# Patient Record
Sex: FEMALE | Race: WHITE | Employment: FULL TIME | URBAN - METROPOLITAN AREA
[De-identification: names, ages, dates, MRNs, and addresses within clinical notes are randomized per-mention and may not be internally consistent; named-entity substitution may affect disease eponyms.]

---
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
## 2022-01-11 ENCOUNTER — HOSPITAL ENCOUNTER (INPATIENT)
Age: 22
LOS: 3 days | Discharge: HOME | DRG: 885 | End: 2022-01-14
Attending: PSYCHIATRY & NEUROLOGY | Admitting: PSYCHIATRY & NEUROLOGY
Payer: COMMERCIAL

## 2022-01-11 DIAGNOSIS — F22 DELUSIONS (HCC): Primary | ICD-10-CM

## 2022-01-11 PROBLEM — F29 PSYCHOTIC DISORDER WITH DELUSIONS (HCC): Status: ACTIVE | Noted: 2022-01-11

## 2022-01-11 LAB
A/G RATIO: 1.7 (ref 1.1–2.2)
ALBUMIN SERPL-MCNC: 4.9 G/DL (ref 3.4–5)
ALP BLD-CCNC: 67 U/L (ref 40–129)
ALT SERPL-CCNC: 11 U/L (ref 10–40)
AMPHETAMINE SCREEN, URINE: NORMAL
ANION GAP SERPL CALCULATED.3IONS-SCNC: 10 MMOL/L (ref 3–16)
AST SERPL-CCNC: 13 U/L (ref 15–37)
BARBITURATE SCREEN URINE: NORMAL
BASOPHILS ABSOLUTE: 0.1 K/UL (ref 0–0.2)
BASOPHILS RELATIVE PERCENT: 1.1 %
BENZODIAZEPINE SCREEN, URINE: NORMAL
BILIRUB SERPL-MCNC: 0.4 MG/DL (ref 0–1)
BILIRUBIN URINE: NEGATIVE
BLOOD, URINE: NEGATIVE
BUN BLDV-MCNC: 8 MG/DL (ref 7–20)
CALCIUM SERPL-MCNC: 9.2 MG/DL (ref 8.3–10.6)
CANNABINOID SCREEN URINE: NORMAL
CHLORIDE BLD-SCNC: 104 MMOL/L (ref 99–110)
CLARITY: CLEAR
CO2: 26 MMOL/L (ref 21–32)
COCAINE METABOLITE SCREEN URINE: NORMAL
COLOR: YELLOW
CREAT SERPL-MCNC: <0.5 MG/DL (ref 0.6–1.1)
EOSINOPHILS ABSOLUTE: 0 K/UL (ref 0–0.6)
EOSINOPHILS RELATIVE PERCENT: 0.7 %
ETHANOL: NORMAL MG/DL (ref 0–0.08)
GFR AFRICAN AMERICAN: >60
GFR NON-AFRICAN AMERICAN: >60
GLUCOSE BLD-MCNC: 101 MG/DL (ref 70–99)
GLUCOSE URINE: NEGATIVE MG/DL
HCG QUALITATIVE: NEGATIVE
HCT VFR BLD CALC: 38.8 % (ref 36–48)
HEMOGLOBIN: 13.2 G/DL (ref 12–16)
KETONES, URINE: NEGATIVE MG/DL
LEUKOCYTE ESTERASE, URINE: NEGATIVE
LYMPHOCYTES ABSOLUTE: 1 K/UL (ref 1–5.1)
LYMPHOCYTES RELATIVE PERCENT: 21.9 %
Lab: NORMAL
MCH RBC QN AUTO: 28.9 PG (ref 26–34)
MCHC RBC AUTO-ENTMCNC: 33.9 G/DL (ref 31–36)
MCV RBC AUTO: 85.3 FL (ref 80–100)
METHADONE SCREEN, URINE: NORMAL
MICROSCOPIC EXAMINATION: NORMAL
MONOCYTES ABSOLUTE: 0.3 K/UL (ref 0–1.3)
MONOCYTES RELATIVE PERCENT: 6.6 %
NEUTROPHILS ABSOLUTE: 3.2 K/UL (ref 1.7–7.7)
NEUTROPHILS RELATIVE PERCENT: 69.7 %
NITRITE, URINE: NEGATIVE
OPIATE SCREEN URINE: NORMAL
OXYCODONE URINE: NORMAL
PDW BLD-RTO: 13.1 % (ref 12.4–15.4)
PH UA: 7.5
PH UA: 7.5 (ref 5–8)
PHENCYCLIDINE SCREEN URINE: NORMAL
PLATELET # BLD: 286 K/UL (ref 135–450)
PMV BLD AUTO: 6.6 FL (ref 5–10.5)
POTASSIUM REFLEX MAGNESIUM: 4.1 MMOL/L (ref 3.5–5.1)
PROPOXYPHENE SCREEN: NORMAL
PROTEIN UA: NEGATIVE MG/DL
RBC # BLD: 4.55 M/UL (ref 4–5.2)
SARS-COV-2, NAAT: NOT DETECTED
SODIUM BLD-SCNC: 140 MMOL/L (ref 136–145)
SPECIFIC GRAVITY UA: 1.01 (ref 1–1.03)
TOTAL PROTEIN: 7.8 G/DL (ref 6.4–8.2)
URINE TYPE: NORMAL
UROBILINOGEN, URINE: 0.2 E.U./DL
WBC # BLD: 4.6 K/UL (ref 4–11)

## 2022-01-11 PROCEDURE — 85025 COMPLETE CBC W/AUTO DIFF WBC: CPT

## 2022-01-11 PROCEDURE — 1240000000 HC EMOTIONAL WELLNESS R&B

## 2022-01-11 PROCEDURE — 81003 URINALYSIS AUTO W/O SCOPE: CPT

## 2022-01-11 PROCEDURE — 80307 DRUG TEST PRSMV CHEM ANLYZR: CPT

## 2022-01-11 PROCEDURE — 87635 SARS-COV-2 COVID-19 AMP PRB: CPT

## 2022-01-11 PROCEDURE — 80053 COMPREHEN METABOLIC PANEL: CPT

## 2022-01-11 PROCEDURE — 36415 COLL VENOUS BLD VENIPUNCTURE: CPT

## 2022-01-11 PROCEDURE — 82077 ASSAY SPEC XCP UR&BREATH IA: CPT

## 2022-01-11 PROCEDURE — 84703 CHORIONIC GONADOTROPIN ASSAY: CPT

## 2022-01-11 PROCEDURE — 99284 EMERGENCY DEPT VISIT MOD MDM: CPT

## 2022-01-11 RX ORDER — HYDROXYZINE PAMOATE 25 MG/1
50 CAPSULE ORAL NIGHTLY PRN
Status: ON HOLD | COMMUNITY
Start: 2021-12-30 | End: 2022-01-14 | Stop reason: HOSPADM

## 2022-01-11 RX ORDER — DIPHENHYDRAMINE HYDROCHLORIDE 50 MG/ML
50 INJECTION INTRAMUSCULAR; INTRAVENOUS EVERY 4 HOURS PRN
Status: DISCONTINUED | OUTPATIENT
Start: 2022-01-11 | End: 2022-01-14 | Stop reason: HOSPADM

## 2022-01-11 RX ORDER — POLYETHYLENE GLYCOL 3350 17 G
2 POWDER IN PACKET (EA) ORAL
Status: DISCONTINUED | OUTPATIENT
Start: 2022-01-11 | End: 2022-01-14 | Stop reason: HOSPADM

## 2022-01-11 RX ORDER — MAGNESIUM HYDROXIDE/ALUMINUM HYDROXICE/SIMETHICONE 120; 1200; 1200 MG/30ML; MG/30ML; MG/30ML
30 SUSPENSION ORAL EVERY 6 HOURS PRN
Status: DISCONTINUED | OUTPATIENT
Start: 2022-01-11 | End: 2022-01-14 | Stop reason: HOSPADM

## 2022-01-11 RX ORDER — LANOLIN ALCOHOL/MO/W.PET/CERES
3 CREAM (GRAM) TOPICAL NIGHTLY PRN
Status: DISCONTINUED | OUTPATIENT
Start: 2022-01-11 | End: 2022-01-12

## 2022-01-11 RX ORDER — PALIPERIDONE 1.5 MG/1
TABLET, EXTENDED RELEASE ORAL
Status: ON HOLD | COMMUNITY
Start: 2022-01-03 | End: 2022-01-14 | Stop reason: HOSPADM

## 2022-01-11 RX ORDER — OLANZAPINE 10 MG/1
10 INJECTION, POWDER, LYOPHILIZED, FOR SOLUTION INTRAMUSCULAR EVERY 8 HOURS PRN
Status: DISCONTINUED | OUTPATIENT
Start: 2022-01-11 | End: 2022-01-14 | Stop reason: HOSPADM

## 2022-01-11 RX ORDER — IBUPROFEN 400 MG/1
400 TABLET ORAL EVERY 6 HOURS PRN
Status: DISCONTINUED | OUTPATIENT
Start: 2022-01-11 | End: 2022-01-14 | Stop reason: HOSPADM

## 2022-01-11 RX ORDER — ACETAMINOPHEN 325 MG/1
650 TABLET ORAL EVERY 4 HOURS PRN
Status: DISCONTINUED | OUTPATIENT
Start: 2022-01-11 | End: 2022-01-14 | Stop reason: HOSPADM

## 2022-01-11 RX ORDER — PRAZOSIN HYDROCHLORIDE 1 MG/1
CAPSULE ORAL
Status: ON HOLD | COMMUNITY
Start: 2021-12-30 | End: 2022-01-12 | Stop reason: ALTCHOICE

## 2022-01-11 RX ORDER — HYDROXYZINE HYDROCHLORIDE 10 MG/1
50 TABLET, FILM COATED ORAL 3 TIMES DAILY PRN
Status: DISCONTINUED | OUTPATIENT
Start: 2022-01-11 | End: 2022-01-14 | Stop reason: HOSPADM

## 2022-01-11 RX ORDER — OLANZAPINE 10 MG/1
10 TABLET ORAL EVERY 8 HOURS PRN
Status: DISCONTINUED | OUTPATIENT
Start: 2022-01-11 | End: 2022-01-14 | Stop reason: HOSPADM

## 2022-01-11 ASSESSMENT — PATIENT HEALTH QUESTIONNAIRE - PHQ9: SUM OF ALL RESPONSES TO PHQ QUESTIONS 1-9: 15

## 2022-01-11 NOTE — ED NOTES
Pt denies suicidal ideations to this RN. Pt reports that their therapist doesn't understand them and thinks their crazy. When asked if pt is SI/HI pt denies.       Ashly Cardozo, VIC  01/11/22 7414

## 2022-01-11 NOTE — ED NOTES
Pt taken to [facility] AN AFFILIATE OF [facility] ER for psychiatric care     Maude Stevenson RN  01/11/22 1890
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
## 2022-01-11 NOTE — ED NOTES
Presenting Problem:Patient presents to ED on SOB by mobile crisis. Per mobile crisis patient's therapist Araceli Turcios [phone] requested she be brought in. Per SOB patient made statements about killing herself to him. Per SOB upon arrival to patient home Mobile Crisis found patient to present with delusional thoughts patterns making comments about \" not being here\", and \"this isn't reality\" and \"needing to get out of this reality\". SOB reports she made comment that she wanted to weigh her options related to dying by suicide. Patient reported to them that anything could be dangerous and spoke of several ways to kill herself such as with a sword, overdosing, or hanging herself. Upon meeting with patient the patient appeared anxious and found to be rocking back and forth. Patient had been rocking back and forth while in ER and in ABI. Patient cooperative and alert and oriented. Patient reports feeling she is not in reality and feels she should not be here. Patient made several comments about me not being able to understand and comments that she knows I think she is crazy however she is not. Made comments that I (this nurse) may be part of it all but she does not know. Patient made several comments about needing to get out of here and needing to get back. When asked how she is going to get back she commented that she did not know but she has to figure it out. Patient made comment during conversation about dying and possibly going back but probably will have to come back here. Patient denies thoughts of harming self or others and denies having suicidal thoughts. Patient reports she did have past suicide attempts however has been doing better. Patient has old scarring on arms and legs from self harm and cutting. Reports she has not cut in 2 weeks.      Appearance/Hygiene:  hospital attire, fair grooming and fair hygiene   Motor Behavior: wnl   Attitude: cooperative  Affect: anxiety   Speech: normal pitch and normal volume  Mood: anxious   Thought Processes: Unusual fears - paranoid delusions  Perceptions: Absent - denies - Does not appear to be responding to internal stimuli  Thought content: paranoid delusions   Orientation: A&Ox4   Memory: intact  Concentration: poor    Insight/ judgement: paranoid ideations      Psychosocial and contextual factors: Patient lives with her best friend Demetris Cain. 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 [employer] flowsheet is  Complete. Psychiatric History (including current outpatient provider and past inpatient admissions): See's a therapist at [address] [place] [address] [phone] . See's a Psychiatric provider [place] [facility]. Patient has been admitted here on [facility] in 6/2021, has been admitted to [facility] in past and multiple admissions at [address] [place] [address].      Access to Firearms: denies      ASSESSMENT FOR IMMINENT FUTURE DANGER:    RISK FACTORS:    [x]  Age <25 or >49   []  Male gender   [x]  Depressed mood   []  Active suicidal ideation   []  Suicide plan   []  Suicide attempt   []  Access to lethal means   []  Prior suicide attempt   []  Active substance abuse ()   []  Highly impulsive behaviors   []  Not attending to self-care/ADLs    []  Recent significant loss   []  Chronic pain or medical illness   []  Social isolation   []  History of violence    [x]  Active psychosis   []  Cognitive impairment    []  No outpatient services in place   []  Medication noncompliance   []  No collateral information to support safety  [] Self- injurious/ Self-harm behavior    PROTECTIVE FACTORS:  [] Age >25 and <55  [x] Female gender   [] Denies depression  [] Denies suicidal ideation  [] Does not have lethal plan   [] Does not have access to guns or weapons  [] Patient is verbally joy for safety  [] No prior suicide attempts  [x] No active substance abuse  [x] Patient has social or family support  [] No active psychosis or cognitive dysfunction  [x] Physically healthy  [x] Has outpatient services in place  [x] Compliant with recommended medications  [] Collateral information from  supports patient safety   [] Patient is future oriented with plans to            [facility], RN  01/11/22 9760

## 2022-01-11 NOTE — ED NOTES
[facility] Val[facility] ED Addiction Navigator met with patient to complete:  [x] Screening, Brief Intervention and Referral to Treatment (SBIRT) Screen  [] Brief Intervention  [] Referral to Treatment  [x] Substance Use Education  [x] Other: (specify)    Community Resource Education - Denies a need at this time     Patient Encounter Details:    Gaby Grief states they haven't drank for a few weeks but verbalized heavy drinking prior to quitting a few weeks ago. The patient was given my contact info and knows to follow up with me if needed    Patient completed the alcohol portion of the screening with a TOTAL SCORE[de-identified] 8, indicating Risky use. Patient completed the drug portion of the screening with a total score of 0, indicating Low Risk use. Patient completed the mood portion of the screening with a TOTAL SCORE [de-identified] 15, indicating Moderately Severe depression. ED Addiction Navigator educated patient on health risks associated with substance use disorder/opioid epidemic and provided information on local resources. ED Addiction Navigator provided their contact information to further assist the patient as needed.     Keerthi Torres  01/11/22 3087

## 2022-01-11 NOTE — ED NOTES
Pt straight stuck for labs, urine collected, covid swab obtained and sent to lab.       Cally Casey RN  01/11/22 6428
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
## 2022-01-12 PROBLEM — F31.2 BIPOLAR DISORDER, CURRENT EPISODE MANIC SEVERE WITH PSYCHOTIC FEATURES (HCC): Status: ACTIVE | Noted: 2022-01-12

## 2022-01-12 PROBLEM — T14.91XA SUICIDE ATTEMPT (HCC): Status: RESOLVED | Noted: 2021-05-31 | Resolved: 2022-01-12

## 2022-01-12 PROBLEM — F64.0 GENDER DYSPHORIA IN ADULT: Status: ACTIVE | Noted: 2022-01-12

## 2022-01-12 PROBLEM — Z91.89 POTENTIAL FOR INTENTIONAL SELF-HARM: Status: ACTIVE | Noted: 2022-01-12

## 2022-01-12 PROBLEM — R45.89 POTENTIAL FOR INTENTIONAL SELF-HARM: Status: ACTIVE | Noted: 2022-01-12

## 2022-01-12 PROCEDURE — 99223 1ST HOSP IP/OBS HIGH 75: CPT | Performed by: PSYCHIATRY & NEUROLOGY

## 2022-01-12 PROCEDURE — 6370000000 HC RX 637 (ALT 250 FOR IP): Performed by: PSYCHIATRY & NEUROLOGY

## 2022-01-12 PROCEDURE — 99221 1ST HOSP IP/OBS SF/LOW 40: CPT

## 2022-01-12 PROCEDURE — 1240000000 HC EMOTIONAL WELLNESS R&B

## 2022-01-12 RX ORDER — PALIPERIDONE 3 MG/1
3 TABLET, EXTENDED RELEASE ORAL DAILY
Status: DISCONTINUED | OUTPATIENT
Start: 2022-01-12 | End: 2022-01-13

## 2022-01-12 RX ORDER — MECOBALAMIN 5000 MCG
10 TABLET,DISINTEGRATING ORAL NIGHTLY
Status: DISCONTINUED | OUTPATIENT
Start: 2022-01-12 | End: 2022-01-14 | Stop reason: HOSPADM

## 2022-01-12 RX ORDER — ARIPIPRAZOLE 10 MG/1
5 TABLET ORAL ONCE
Status: DISCONTINUED | OUTPATIENT
Start: 2022-01-12 | End: 2022-01-12 | Stop reason: CLARIF

## 2022-01-12 RX ORDER — ARIPIPRAZOLE 10 MG/1
5 TABLET ORAL ONCE
Status: DISCONTINUED | OUTPATIENT
Start: 2022-01-12 | End: 2022-01-13

## 2022-01-12 RX ADMIN — HYDROXYZINE HYDROCHLORIDE 50 MG: 10 TABLET ORAL at 00:38

## 2022-01-12 RX ADMIN — PALIPERIDONE 3 MG: 3 TABLET, EXTENDED RELEASE ORAL at 10:36

## 2022-01-12 ASSESSMENT — LIFESTYLE VARIABLES
HISTORY_ALCOHOL_USE: YES
HISTORY_ALCOHOL_USE: YES

## 2022-01-12 ASSESSMENT — SLEEP AND FATIGUE QUESTIONNAIRES
SLEEP PATTERN: DIFFICULTY FALLING ASLEEP;DISTURBED/INTERRUPTED SLEEP;NIGHTMARES/TERRORS
RESTFUL SLEEP: NO
DO YOU HAVE DIFFICULTY SLEEPING: YES
DIFFICULTY STAYING ASLEEP: YES
DIFFICULTY FALLING ASLEEP: YES
AVERAGE NUMBER OF SLEEP HOURS: 2
DIFFICULTY FALLING ASLEEP: YES
SLEEP PATTERN: DIFFICULTY FALLING ASLEEP;DISTURBED/INTERRUPTED SLEEP;NIGHTMARES/TERRORS
DO YOU USE A SLEEP AID: YES
RESTFUL SLEEP: NO
DO YOU USE A SLEEP AID: YES
DIFFICULTY ARISING: NO
DO YOU HAVE DIFFICULTY SLEEPING: YES
AVERAGE NUMBER OF SLEEP HOURS: 2
DIFFICULTY STAYING ASLEEP: YES
DIFFICULTY ARISING: NO

## 2022-01-12 ASSESSMENT — PAIN DESCRIPTION - LOCATION: LOCATION: HAND

## 2022-01-12 ASSESSMENT — ENCOUNTER SYMPTOMS
EYES NEGATIVE: 1
RESPIRATORY NEGATIVE: 1
GASTROINTESTINAL NEGATIVE: 1

## 2022-01-12 ASSESSMENT — PAIN DESCRIPTION - FREQUENCY: FREQUENCY: CONTINUOUS

## 2022-01-12 ASSESSMENT — PAIN DESCRIPTION - ORIENTATION: ORIENTATION: LEFT

## 2022-01-12 ASSESSMENT — PAIN DESCRIPTION - PROGRESSION: CLINICAL_PROGRESSION: GRADUALLY IMPROVING

## 2022-01-12 ASSESSMENT — PAIN DESCRIPTION - ONSET: ONSET: UNABLE TO TELL

## 2022-01-12 ASSESSMENT — PAIN SCALES - GENERAL: PAINLEVEL_OUTOF10: 4

## 2022-01-12 ASSESSMENT — PAIN - FUNCTIONAL ASSESSMENT: PAIN_FUNCTIONAL_ASSESSMENT: ACTIVITIES ARE NOT PREVENTED

## 2022-01-12 ASSESSMENT — PAIN DESCRIPTION - DESCRIPTORS: DESCRIPTORS: ACHING

## 2022-01-12 ASSESSMENT — PATIENT HEALTH QUESTIONNAIRE - PHQ9: SUM OF ALL RESPONSES TO PHQ QUESTIONS 1-9: 13

## 2022-01-12 NOTE — PROGRESS NOTES
Patient reports meeting with provider this morning went well and that the expectation is to stay 4-5 days.

## 2022-01-12 NOTE — BH NOTE
0700  Continues on Line of Sight 1:1 for safety. Cooperative. Sleeps for very brief periods. There were loud noises from an upper floor and pt was awakened. Rocks at times.

## 2022-01-12 NOTE — BH NOTE
0500  Pt lying on bed, possibly sleeping. Respirations easy and even. No movement noted at this time.

## 2022-01-12 NOTE — PLAN OF CARE
Problem: Depressive Behavior With or Without Suicide Precautions:  Goal: Able to verbalize and/or display a decrease in depressive symptoms  Description: Able to verbalize and/or display a decrease in depressive symptoms  Outcome: Ongoing     Problem: Altered Mood, Psychotic Behavior:  Goal: Able to verbalize decrease in frequency and intensity of hallucinations  Description: Able to verbalize decrease in frequency and intensity of hallucinations  Outcome: Ongoing   Patient is sitting on the stretcher rocking and running fingers through hair. Staff asked if they were feeling anxious, but denies they are. Patient is A&Ox3 and has no insight into why they are in the hospital. They deny that they are crazy and have no reason for staying. They deny they are having thoughts that to want to harm self or others. They deny that they are experiencing hallucinations like voices, but does admit to seeing shadows and things out of the corner of their eye at home. Patient does seem preoccupied with IS. They have not made any delusional statements. They recently started seeing a psychiatrist Shaylee Srinivasan and started on Invega about 1 week ago with the dose 1.5 mg and moved it to 3 mg 2 days ago. He has not felt any different effect. He has been on multiple medications in the past and told staff: Zoloft, Prozac, Lithium, Risperdal, and Seroquel. He reports being young when being started on medications. He does not recall which medications were effective. His psychiatrist did not explain why they choose Invega. He started with a psychiatrist because his therapist of a year was going to stop treating if he did not go to a psychiatrist. He does not engage staff with conversation much and mainly answer questions verses elaborating on answers. He also will just not acknowledge that staff is talking to him.  As he was sitting on the bed and with the patient gown staff could see his left leg and there was a large amount of faint scarring what appears from cutting from the knee to the hip area. His skin on the left thigh was intact.
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
## 2022-01-12 NOTE — PROGRESS NOTES
Patient declined to eat breakfast and did not want to order lunch or dinner. He was made aware if he changes his mind staff would call down to get him something.
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
## 2022-01-12 NOTE — BH NOTE
Pt had been awake and rocking on her bead. Awake. Pt will lie down for a few moments at a time but then will sit back up and rock.

## 2022-01-12 NOTE — BH NOTE
Pt is quiet in her room. Sitting on bed and rocking. No needs voice. Pt is not taking any food or water from staff. Offered sealed juice, but denied need.

## 2022-01-12 NOTE — BH NOTE
O600  Pt remains in her room on her bed and is a line of sight 1:1 for safety. Pt at times is rocking. Occasionally appears to sleep for a very few minutes at a time. Quiet. Not eating or drinking since arrived.

## 2022-01-12 NOTE — CARE COORDINATION
[facility]  Treatment Team Note  Day 1    Review Date & Time: 0900 1/12/22    Patient was not in treatment team      Status EXAM:   Status and Exam  Normal: No  Facial Expression: Avoids Gaze,Flat,Worried  Affect: Blunt  Level of Consciousness: Alert  Mood:Normal: No  Mood: Depressed,Anxious,Suspicious  Motor Activity:Normal: No  Motor Activity: Decreased,Repetitive Acts  Interview Behavior: Evasive,Cooperative,Uncooperative/Withdrawn  Preception: [place] to Person,[place] to Time,[place] to Place  Attention:Normal: No  Attention: Distractible  Thought Processes: Tangential  Thought Content:Normal: No  Thought Content: Preoccupations  Hallucinations: Visual (Comment) (denies AH)  Delusions: No (did not make any comments during interactions)  Delusions: Persecution (paranoia)  Memory:Normal: No  Memory: Poor Recent  Insight and Judgment: No  Insight and Judgment: Poor Judgment,Poor Insight,Unrealistic  Present Suicidal Ideation: No  Present Homicidal Ideation: No      Suicide Risk CSSR-S:  1) Within the past month, have you wished you were dead or wished you could go to sleep and not wake up? : Yes  2) Have you actually had any thoughts of killing yourself? : Yes  3) Have you been thinking about how you might kill yourself? : Yes  5) Have you started to work out or worked out the details of how to kill yourself? Do you intend to carry out this plan? : Yes (would rather not disclose plan)  6) Have you ever done anything, started to do anything, or prepared to do anything to end your life?: Yes      PLAN/TREATMENT RECOMMENDATIONS UPDATE: Patient will take medication as prescribed, eat 75% of meals, attend groups, participate in milieu activities, participate in treatment team and care planning for discharge and follow up. Patient 1:1 currently for suicide risk.     Jovan Motley RN

## 2022-01-12 NOTE — PROGRESS NOTES
During safety check, patient noted to be under her desk in her assigned room. Blood was noted to be on patients right forearm. Patient was directed to come out from under the desk. Patient was compliant. When patient was questioned what she used to harm herself, patient showed this writer a small piece of metal.  Patient showed writer where she found the object. Object was slid under the desk leg. Patient stated, \"I am just trying to get back and this didn't work. I did not cut deep enough to get back. \"  When patient asked to elaborate, patient stated, \"We are here in this spot, I need to get back to timeline but I don't know how. I guess that didn't work. \"  Patient asked if she could contract for safety. Patient stated, \"It depends, the timeline safety or the spot safety. It is not safe here in this spot. \"  Patient's primary nurse notified.

## 2022-01-12 NOTE — BH NOTE
Notified per Kory Daley that pt had cut self. Upon entering room, Greta Marx was talking with pt. Blood noted on inner right arm. Area clean and pt had mostly superficial scratches the length of her forearm from inner elbow to inner wrist.Approx 0030   Arm area was cleaned with saline. No puckering or gaping noted in any of the cut areas. Most areas are superficial scratches. Area cleaned with saline and clean dry dressings applied to the inner arm and tape applied. Talked with pt and she expressed being sorry for not keeping her promise not to harm self. She expressed that she just wanted to go home. States when she found the metal piece in the room, she decided to act while she could. Stated she just wants to go home where she should really be on the timeline. Thinks she has to be dead to get back to where she wants to be. States that she needs to get back out this \"spot\" to have her old life back. States needs to make things right with her best friend and roommate ad needs to get back to work, or they will lose the apartment. 405[address] Dr [address] Res apprised of the incident. Let her know that the pt's wounds were superficial.  Let her know we planned to give pt Vistaril. She also ordered Abilify 5 mg po which pt later refused because she said that she couldn't take due to n/a issues with the drug. Also order per MD was 1 to 1 observation. 0100, approx time. Niall Trimble Rn, Clinical  and informed her of the situation. 1:1 started at approx this time but actually had been watched carefully since the incident occurred at [facility] to unit, saw pt.and took pictures of her wounds. Pt's arm redressed with Vaseline guaze and soft 4x4's and paper tape. Pt refusing Abilify as noted above.
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
## 2022-01-12 NOTE — BH NOTE
0400 Pt has laid on bed a few time during the last hour. However at this time she is sitting up rocking with less vigor than at earlier times tonight. No needs voiced.

## 2022-01-12 NOTE — PROGRESS NOTES
Patient did not eat any of the meals offered. She did not drink any fluids offered to her. She was encouraged to eat and drink fluids through shift. She was even offered prepackaged items would not eat.

## 2022-01-12 NOTE — PROGRESS NOTES
Writer was able to verify pt's medication with [facility]. Provider was notified.

## 2022-01-12 NOTE — PROGRESS NOTES
Patient wanted to take off dressing over forearm because it was coming off and annoying him. It is dry and scabbed over not actively bleeding. He wanted to leave it open to air. He was informed if he wanted it redressed or covered for sleep tonight just let staff know. He verbalized understanding and returned to resting in bed after throwing away the dressing.
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
## 2022-01-13 PROCEDURE — 6370000000 HC RX 637 (ALT 250 FOR IP): Performed by: PSYCHIATRY & NEUROLOGY

## 2022-01-13 PROCEDURE — 99232 SBSQ HOSP IP/OBS MODERATE 35: CPT | Performed by: PSYCHIATRY & NEUROLOGY

## 2022-01-13 PROCEDURE — 1240000000 HC EMOTIONAL WELLNESS R&B

## 2022-01-13 RX ORDER — PALIPERIDONE 3 MG/1
3 TABLET, EXTENDED RELEASE ORAL ONCE
Status: DISCONTINUED | OUTPATIENT
Start: 2022-01-13 | End: 2022-01-13

## 2022-01-13 RX ORDER — PALIPERIDONE 3 MG/1
6 TABLET, EXTENDED RELEASE ORAL ONCE
Status: COMPLETED | OUTPATIENT
Start: 2022-01-13 | End: 2022-01-13

## 2022-01-13 RX ORDER — PALIPERIDONE 3 MG/1
6 TABLET, EXTENDED RELEASE ORAL DAILY
Status: DISCONTINUED | OUTPATIENT
Start: 2022-01-14 | End: 2022-01-14 | Stop reason: HOSPADM

## 2022-01-13 RX ADMIN — PALIPERIDONE 6 MG: 3 TABLET, EXTENDED RELEASE ORAL at 12:15

## 2022-01-13 NOTE — PLAN OF CARE
Problem: Suicide risk  Goal: Provide patient with safe environment  Description: Provide patient with safe environment  Outcome: Ongoing     Problem: Depressive Behavior With or Without Suicide Precautions:  Goal: Absence of self-harm  Description: Absence of self-harm  1/13/2022 1817 by Bernice Lopez LPN  Outcome: Ongoing      Pt. Continued to have a safe day. Free from harm. Socializing with peers at end of shift.

## 2022-01-13 NOTE — FLOWSHEET NOTE
One to one staff observation continues for self harm. No self harm noted. Patient currently resting with eyes closed and respirations even and easy.

## 2022-01-13 NOTE — FLOWSHEET NOTE
One to one staff observation continues for self harm behaviors. Patient presently sitting awake in bed. No self harm noted.

## 2022-01-13 NOTE — FLOWSHEET NOTE
Assumed care of patient at 1. Patient in room at present sitting in bed appearing anxious rocking some. Patient denies anxiety, states is \" OK\",  denies SI, HI and AVH. Patient's right arm with multiple superficial lacerations which are dry with no signs of infection. Patient with one to one staff observer due to self harm behaviors. Patient did voice some possible paranoid delusions to staff about feeling like patient is being \" gas lighted\"  But this could also be true. She did talk about time lines and feeling like they are not in the right time. Safety checks continue.

## 2022-01-13 NOTE — PROGRESS NOTES
Department of Psychiatry  AttendingProgress Note    Chief Complaint: Delusions    The treatment team met and discussed the treatment plan. SUBJECTIVE:    Delusions persist  Patient admits to hearing people talking about him    Tolerating medications without side effects    Does agree to not harm himself while he is in the hospital but woiuld harm self if discharged      Patient does not have medication side effects. ROS: Patient has new complaints: yes - auditory hallucinations  Sleeping adequately:  Yes. Finally slept 5 hours overnight. Appetite adequate: Yes  Attending groups: Yes  Visitors:No    OBJECTIVE    Physical  Vitals:    Blood pressure (!) 114/58, pulse 83, temperature 98.6 °F (37 °C), temperature source Temporal, resp. rate 16, height 5' 7\" (1.702 m), weight 199 lb (90.3 kg), SpO2 96 %, not currently breastfeeding.   General appearance:  [x]  appears age, []  appears older than stated age,     [x]  adequately dressed and groomed, [] disheveled,                []  in no acute distress, [] appears mildly distressed,      []  Other:      MUSCULOSKELETAL:   Gait:   [x] normal, [] antalgic, [] unsteady, [] in bed, gait not evaluated   Station:  [x] erect, [] sitting, [] recumbent, [] other        Strength/tone:  [x] muscle strength and tone appear consistent with age and condition     [] atrophy      [] abnormal movements  PSYCHIATRIC:    Relatedness:  [x] cooperative, [] guarded, [] indifferent, [] hostile,      [] sedated  Speech:  [x] normal prosody, [] pressured, [] decreased volume,    [] slurred, [] halting, [] slowed, [] other     [] echolalia, [] incoherent, [] stuttering   Eye contact:  [x] direct, [] avoidant, [] intense  Kinetics:  [x] normal, [] increased, [] decreased  Mood:   [x] euthymic, [] depressed, [] anxious, [] irritable,     [] labile  Affect:   [x] normal range, [] constricted, [] depressed, [] anxious,     [] angry, [] blunted, [] flat  Hallucinations  [] denies, [x] auditory, [] visual,  [] olfactory, [] tactile  Delusions  [] none, [] grandiose,  [] jealous,  [] persecutory,  [] somatic,     [x] bizarre  Preoccupations  [] none, [] violence, [] obsessions, [] other     Suicidal ideation  [] denies, [x] endorses  Homicidal ideation [x] denies, [] endorses  Thought process: [] logical, [x] circumstantial, [] tangential, [] HECTOR,     [] concrete, [] disorganized  Associations:  [x] logical and coherent, [] loosening, [] disorganized  Insight:   [] good, [] fair, [x] poor  Judgment:  [] good, [x] fair, [] poor  Attention and concentration:     [x] intact, [] limited, [] able to focus on interview,     [] grossly impaired  Orientation:  [x] person, place, time, situation     [] disoriented to:     Memory:  Remote memory [x] intact, [] impaired     Recent memory  [x] intact, [] impaired          Data  Labs:   Admission on 01/11/2022   Component Date Value Ref Range Status    WBC 01/11/2022 4.6  4.0 - 11.0 K/uL Final    RBC 01/11/2022 4.55  4.00 - 5.20 M/uL Final    Hemoglobin 01/11/2022 13.2  12.0 - 16.0 g/dL Final    Hematocrit 01/11/2022 38.8  36.0 - 48.0 % Final    MCV 01/11/2022 85.3  80.0 - 100.0 fL Final    MCH 01/11/2022 28.9  26.0 - 34.0 pg Final    MCHC 01/11/2022 33.9  31.0 - 36.0 g/dL Final    RDW 01/11/2022 13.1  12.4 - 15.4 % Final    Platelets 29/67/4553 286  135 - 450 K/uL Final    MPV 01/11/2022 6.6  5.0 - 10.5 fL Final    Neutrophils % 01/11/2022 69.7  % Final    Lymphocytes % 01/11/2022 21.9  % Final    Monocytes % 01/11/2022 6.6  % Final    Eosinophils % 01/11/2022 0.7  % Final    Basophils % 01/11/2022 1.1  % Final    Neutrophils Absolute 01/11/2022 3.2  1.7 - 7.7 K/uL Final    Lymphocytes Absolute 01/11/2022 1.0  1.0 - 5.1 K/uL Final    Monocytes Absolute 01/11/2022 0.3  0.0 - 1.3 K/uL Final    Eosinophils Absolute 01/11/2022 0.0  0.0 - 0.6 K/uL Final    Basophils Absolute 01/11/2022 0.1  0.0 - 0.2 K/uL Final    Sodium 01/11/2022 140  136 - 145 mmol/L Final    Potassium reflex Magnesium 01/11/2022 4.1  3.5 - 5.1 mmol/L Final    Chloride 01/11/2022 104  99 - 110 mmol/L Final    CO2 01/11/2022 26  21 - 32 mmol/L Final    Anion Gap 01/11/2022 10  3 - 16 Final    Glucose 01/11/2022 101* 70 - 99 mg/dL Final    BUN 01/11/2022 8  7 - 20 mg/dL Final    CREATININE 01/11/2022 <0.5* 0.6 - 1.1 mg/dL Final    GFR Non- 01/11/2022 >60  >60 Final    Comment: >60 mL/min/1.73m2 EGFR, calc. for ages 25 and older using the  MDRD formula (not corrected for weight), is valid for stable  renal function.  GFR  01/11/2022 >60  >60 Final    Comment: Chronic Kidney Disease: less than 60 ml/min/1.73 sq.m. Kidney Failure: less than 15 ml/min/1.73 sq.m. Results valid for patients 18 years and older.       Calcium 01/11/2022 9.2  8.3 - 10.6 mg/dL Final    Total Protein 01/11/2022 7.8  6.4 - 8.2 g/dL Final    Albumin 01/11/2022 4.9  3.4 - 5.0 g/dL Final    Albumin/Globulin Ratio 01/11/2022 1.7  1.1 - 2.2 Final    Total Bilirubin 01/11/2022 0.4  0.0 - 1.0 mg/dL Final    Alkaline Phosphatase 01/11/2022 67  40 - 129 U/L Final    ALT 01/11/2022 11  10 - 40 U/L Final    AST 01/11/2022 13* 15 - 37 U/L Final    Color, UA 01/11/2022 Yellow  Straw/Yellow Final    Clarity, UA 01/11/2022 Clear  Clear Final    Glucose, Ur 01/11/2022 Negative  Negative mg/dL Final    Bilirubin Urine 01/11/2022 Negative  Negative Final    Ketones, Urine 01/11/2022 Negative  Negative mg/dL Final    Specific Gravity, UA 01/11/2022 1.015  1.005 - 1.030 Final    Blood, Urine 01/11/2022 Negative  Negative Final    pH, UA 01/11/2022 7.5  5.0 - 8.0 Final    Protein, UA 01/11/2022 Negative  Negative mg/dL Final    Urobilinogen, Urine 01/11/2022 0.2  <2.0 E.U./dL Final    Nitrite, Urine 01/11/2022 Negative  Negative Final    Leukocyte Esterase, Urine 01/11/2022 Negative  Negative Final    Microscopic Examination 01/11/2022 Not Indicated   Final    Urine Type alternative molecular  assay. Negative results do not preclude SARS-CoV-2 infection and  should not be used as the sole basis for patient management decisions. This test has been authorized by the FDA under an Emergency Use  Authorization (EUA) for use by authorized laboratories. Fact sheet for Healthcare Providers:  Marek  Fact sheet for Patients: Marek    METHODOLOGY: Isothermal Nucleic Acid Amplification              Medications  Current Facility-Administered Medications: [START ON 1/14/2022] paliperidone (INVEGA) extended release tablet 6 mg, 6 mg, Oral, Daily  paliperidone (INVEGA) extended release tablet 6 mg, 6 mg, Oral, Once  melatonin disintegrating tablet 10 mg, 10 mg, Oral, Nightly  acetaminophen (TYLENOL) tablet 650 mg, 650 mg, Oral, Q4H PRN  ibuprofen (ADVIL;MOTRIN) tablet 400 mg, 400 mg, Oral, Q6H PRN  magnesium hydroxide (MILK OF MAGNESIA) 400 MG/5ML suspension 30 mL, 30 mL, Oral, Daily PRN  nicotine polacrilex (COMMIT) lozenge 2 mg, 2 mg, Oral, Q1H PRN  aluminum & magnesium hydroxide-simethicone (MAALOX) 200-200-20 MG/5ML suspension 30 mL, 30 mL, Oral, Q6H PRN  hydrOXYzine (ATARAX) tablet 50 mg, 50 mg, Oral, TID PRN  OLANZapine (ZYPREXA) tablet 10 mg, 10 mg, Oral, Q8H PRN **OR** OLANZapine (ZYPREXA) injection 10 mg, 10 mg, IntraMUSCular, Q8H PRN  sterile water injection 2.1 mL, 2.1 mL, IntraMUSCular, Q4H PRN  diphenhydrAMINE (BENADRYL) injection 50 mg, 50 mg, IntraMUSCular, Q4H PRN    ASSESSMENT AND PLAN    Principal Problem:    Bipolar disorder, current episode manic severe with psychotic features ([address]  Active Problems:    Psychotic disorder with delusions ([address]    Gender dysphoria in adult    Potential for intentional self-harm    Arm wound, unspecified laterality, subsequent encounter    Encounter for routine adult medical examination  Resolved Problems:    * No resolved hospital problems. *     Slept better overnight. Still with delusions, now admitting to hallucinations. Agrees to increase Invega. 1.Increase Invega to 6 mg daily  2. Probable discharge is next week  3. Discharge planning is incomplete  4.  Suicidal ideation is unchanged, but agrees not to commit suicide while in the hospital.  Will discontinue 1:1

## 2022-01-13 NOTE — FLOWSHEET NOTE
One to one staff observation continues for prevention of self harm behaviors.    Patient resting in bed at present with eyes closed and respirations even and easy.

## 2022-01-13 NOTE — FLOWSHEET NOTE
One to one staff observation continues for prevention of self harm. Patient sitting awake in bed , calm and cooperative at present. Patient did drink 2 juices. No unsafe behaviors noted.

## 2022-01-13 NOTE — FLOWSHEET NOTE
Patient remains with one to one staff for constant observation for self harm behavior prevention. Patient appears anxious and is rocking in bed. Patient offered PRN vistaril but declined stating that they are \" OK\". Patient refused scheduled 2100 melatonin stating that he does not take it. Patient verbally reassured. No self harm behaviors noted. Safety checks continue.

## 2022-01-13 NOTE — PLAN OF CARE
Nursing shift report 1930 to present- Patient remains with one to one staff observation to prevent self harm behaviors. Patent has shown no self harm behaviors this shift. Patient denies SI, HI and AVH. Patient did make some delusional comments about not being in the correct timeline and also talked about hearing  people talking about and laughing at them. Patient with poor appetite and only drank 2 juices this shift. Patient refused to eat anything. Patient refused scheduled melatonin and refused offered medications for anxiety.      Problem: Depressive Behavior With or Without Suicide Precautions:  Goal: Absence of self-harm  Description: Absence of self-harm  Outcome: Ongoing     Problem: Altered Mood, Psychotic Behavior:  Goal: Able to demonstrate trust by eating, participating in treatment and following staff's direction  Description: Able to demonstrate trust by eating, participating in treatment and following staff's direction  Outcome: Ongoing

## 2022-01-14 VITALS
OXYGEN SATURATION: 98 % | WEIGHT: 199 LBS | HEART RATE: 95 BPM | DIASTOLIC BLOOD PRESSURE: 81 MMHG | HEIGHT: 67 IN | TEMPERATURE: 97.9 F | RESPIRATION RATE: 18 BRPM | BODY MASS INDEX: 31.23 KG/M2 | SYSTOLIC BLOOD PRESSURE: 128 MMHG

## 2022-01-14 PROCEDURE — 99233 SBSQ HOSP IP/OBS HIGH 50: CPT

## 2022-01-14 PROCEDURE — 6370000000 HC RX 637 (ALT 250 FOR IP): Performed by: PSYCHIATRY & NEUROLOGY

## 2022-01-14 PROCEDURE — 5130000000 HC BRIDGE APPOINTMENT

## 2022-01-14 RX ORDER — MECOBALAMIN 5000 MCG
10 TABLET,DISINTEGRATING ORAL NIGHTLY
Qty: 60 TABLET | Refills: 0 | Status: ON HOLD | OUTPATIENT
Start: 2022-01-14 | End: 2022-02-23

## 2022-01-14 RX ORDER — PALIPERIDONE 6 MG/1
6 TABLET, EXTENDED RELEASE ORAL DAILY
Qty: 30 TABLET | Refills: 0 | Status: ON HOLD | OUTPATIENT
Start: 2022-01-15 | End: 2022-02-23

## 2022-01-14 RX ADMIN — PALIPERIDONE 6 MG: 3 TABLET, EXTENDED RELEASE ORAL at 09:31

## 2022-01-14 NOTE — BH NOTE
[facility]  Discharge Note    Pt discharged with followings belongings:   Dental Appliances: None  Vision - Corrective Lenses: None  Hearing Aid: None  Jewelry: None  Body Piercings Removed: N/A  Clothing: Footwear,Pants,Shirt,Socks,Undergarments (Comment)  Were All Patient Medications Collected?: Not Applicable  Other Valuables: Cell phone,Wallet,Keys (cell phone to safe)   Valuables sent home with patient. Patient education on aftercare instructions: Yes Information faxed to [facility] by Genaro Jacobsen RN  at 3:10 PM .Patient verbalize understanding of AVS: Yes  .     Status EXAM upon discharge:  Status and Exam  Normal: No  Facial Expression: Avoids Gaze  Affect: Blunt  Level of Consciousness: Alert  Mood:Normal: No  Mood: Anxious  Motor Activity:Normal: No  Motor Activity: Repetitive Acts  Interview Behavior: Cooperative  Preception: Cabins to Person,[place]abins to Time,Cabins to Place,[place] to Situation  Attention:Normal: Yes  Attention: Unable to Concentrate  Thought Processes: Blocking  Thought Content:Normal: No  Thought Content: Delusions,Paranoia  Hallucinations: None  Delusions: Yes  Delusions: Persecution  Memory:Normal: No  Memory: Poor Recent  Insight and Judgment: No  Insight and Judgment: Poor Judgment,Poor Insight  Present Suicidal Ideation: No  Present Homicidal Ideation: No      Metabolic Screening:    Lab Results   Component Value Date    LABA1C 5.1 06/08/2021       Lab Results   Component Value Date    CHOL 111 06/08/2021     Lab Results   Component Value Date    TRIG 77 06/08/2021     Lab Results   Component Value Date    HDL 35 (L) 06/08/2021     No components found for: [facility] EVALUATION AND TREATMENT [place]  Lab Results   Component Value Date    LABVLDL 15 06/08/2021       Charmaine Cullen RN

## 2022-01-14 NOTE — PLAN OF CARE
Problem: Depressive Behavior With or Without Suicide Precautions:  Goal: Able to verbalize acceptance of life and situations over which he or she has no control  Description: Able to verbalize acceptance of life and situations over which he or she has no control  Outcome: Ongoing     Problem: Altered Mood, Psychotic Behavior:  Goal: Able to demonstrate trust by eating, participating in treatment and following staff's direction  Description: Able to demonstrate trust by eating, participating in treatment and following staff's direction  Outcome: Ongoing     Pt is visible on unit but selectively social. Attends group. A&O X4 remains calm and cooperative. Pt reports being here r/t delusions r/t the universe and time. She states she feels like she is starting to see that she is delusional but she is still very suspicious. Pt states she is worried that \"we might all be in on it and programmed to say what I say\" and therefor she has not been eating the food here. Pt also talks about there being a glitch that she keeps seeing and +AH denies them being command. She reports good sleep and denies anxiety or depression.

## 2022-01-14 NOTE — GROUP NOTE
Group Therapy Note    Date: 1/13/2022    Group Start Time: 2000  Group End Time: 2030  Group Topic: Wrap-Up    [facility]        Group Therapy Note    Attendees: Goals and importance of goal setting discussed. Night time milieu activities discussed. Patient's Goal:  To get 1:1 dc'd    Notes:  Successful     Status After Intervention:  Improved    Participation Level:  Active Listener    Participation Quality: Appropriate and Attentive      Speech:  normal      Thought Process/Content: Logical  Linear      Affective Functioning: Congruent      Mood: anxious      Level of consciousness:  Alert and Oriented x4      Response to Learning: Progressing to goal      Endings: None Reported    Modes of Intervention: Support      Discipline Responsible: [employer]      Signature:  Eyad Villanueva

## 2022-01-14 NOTE — GROUP NOTE
Group Therapy Note    Date: 1/14/2022    Group Start Time: 1100  Group End Time: 1150  Group Topic: Cognitive Skills    [id number] [facility]        Group Therapy Note    Attendees: 11    Group members were asked to draw their hand and interact with others in the group while writing positive affirmations on their hands. When finished, members shared. Notes:  Mimi Carrillo attended group for the full duration. Mimi Carrillo completed the activity and interacted appropriately with others in the group. Status After Intervention:  Improved    Participation Level:  Active Listener and Interactive    Participation Quality: Appropriate and Attentive      Speech:  normal      Thought Process/Content: Logical      Affective Functioning: Congruent      Mood: Brightened, Engaging     Level of consciousness:  Alert, Oriented x4 and Attentive      Response to Learning: Able to verbalize current knowledge/experience      Endings: None Reported    Modes of Intervention: Socialization, Exploration and Activity      Discipline Responsible: [employer]      Signature:  MARLENE Aleman

## 2022-01-14 NOTE — BH NOTE
Bridge Appointment completed: Reviewed Discharge Instructions with patient. Patient verbalizes understanding and agreement with the discharge plan using the teachback method.      Referral for Outpatient Tobacco Cessation Counseling, upon discharge (diane X if applicable and completed):    ( )  Hospital staff assisted patient to call Quit Line or faxed referral                                   during hospitalization                  ( )  Recognizing danger situations (included triggers and roadblocks), if not completed on admission                    ( )  Coping skills (new ways to manage stress, exercise, relaxation techniques, changing routine, distraction), if not completed on admission                                                           ( )  Basic information about quitting (benefits of quitting, techniques in how to quit, available resources, if not completed on admission  ( ) Referral for counseling faxed to [facility]   ( ) Patient refused referral  ( ) Patient refused counseling  (X ) Patient refused smoking cessation medication upon discharge    Vaccinations (diane X if applicable and completed):  ( ) Patient states already received influenza vaccine elsewhere  ( ) Patient received influenza vaccine during this hospitalization  ( X) Patient refused influenza vaccine at this time

## 2022-01-14 NOTE — PLAN OF CARE
Pt calm and cooperative, attends groups and community meeting, denies SI,HI,AVH, no distress noted. States she is ready to go home.

## 2022-01-14 NOTE — DISCHARGE SUMMARY
Discharge Summary     Admit Date: 1/11/2022   Discharge Date:    1/14/2022    Spent over 40 minutes with patient and staff on [facility] more than 50 % of that time was spent counseling the patient on their symptoms, treatment, and expected goals. Final Dx: Bipolar disorder, current episode manic severe with psychotic features ([facility] [address]     At Discharge: mild symptoms  All conditions and active problems were treated while patient was hospitalized. Condition on DC  Mood and affect are stable and pt is not suicidal     VITALS:  /81   Pulse 95   Temp 97.9 °F (36.6 °C) (Temporal)   Resp 18   Ht 5' 7\" (1.702 m)   Wt 199 lb (90.3 kg)   SpO2 98%   Breastfeeding No   BMI 31.17 kg/m²     Brief Summary Present Illness   \"Ross\" is a 25year old non-binary person (pronouns he/them) who for one week has had decreased sleep, racing thoughts, difficulty concentrating, a subjective feeling of agitation, and delusions. He believed that he is part of the \"real\" timeline, but has been \"plucked\" out of the real timeline and is now lost in a fake timeline. This is causing him to feel \"mentally tortured. \"  He also has seen people's eyes turn black while at work and has felt people's \"energy\" be malevolent. There is no obvious cause for this episode. He has also had auditory hallucinations of voices and possibly a radio playing. Today, pt states that he feels he understands that what he is feeling, seeing, and hearing is not real.  Pt states that thought he still has these thoughts at times, he is able to stop and understand that it may not be real.  Pt presented a list of reasons why he is okay to be discharged today. Pt states that he is no longer acutely ill, since he can differentiate between what is real and what is not. Pt states he has no thoughts of hurting himself. Pt has an appointment on Monday to see his psychiatrist.  Dr. Mellissa Wilson was advised on discharge, and agreeable for pt to leave today.    Pt denies thoughts of suicide and is future oriented at this time    Hospital Course Patient stabilized on meds and milieu treatment. Patient was discharged to home to continue recovery in the community. PE: (reviewed) and labs (see medical H&PE)  Labs:    Admission on 01/11/2022   Component Date Value Ref Range Status    WBC 01/11/2022 4.6  4.0 - 11.0 K/uL Final    RBC 01/11/2022 4.55  4.00 - 5.20 M/uL Final    Hemoglobin 01/11/2022 13.2  12.0 - 16.0 g/dL Final    Hematocrit 01/11/2022 38.8  36.0 - 48.0 % Final    MCV 01/11/2022 85.3  80.0 - 100.0 fL Final    MCH 01/11/2022 28.9  26.0 - 34.0 pg Final    MCHC 01/11/2022 33.9  31.0 - 36.0 g/dL Final    RDW 01/11/2022 13.1  12.4 - 15.4 % Final    Platelets 74/52/1974 286  135 - 450 K/uL Final    MPV 01/11/2022 6.6  5.0 - 10.5 fL Final    Neutrophils % 01/11/2022 69.7  % Final    Lymphocytes % 01/11/2022 21.9  % Final    Monocytes % 01/11/2022 6.6  % Final    Eosinophils % 01/11/2022 0.7  % Final    Basophils % 01/11/2022 1.1  % Final    Neutrophils Absolute 01/11/2022 3.2  1.7 - 7.7 K/uL Final    Lymphocytes Absolute 01/11/2022 1.0  1.0 - 5.1 K/uL Final    Monocytes Absolute 01/11/2022 0.3  0.0 - 1.3 K/uL Final    Eosinophils Absolute 01/11/2022 0.0  0.0 - 0.6 K/uL Final    Basophils Absolute 01/11/2022 0.1  0.0 - 0.2 K/uL Final    Sodium 01/11/2022 140  136 - 145 mmol/L Final    Potassium reflex Magnesium 01/11/2022 4.1  3.5 - 5.1 mmol/L Final    Chloride 01/11/2022 104  99 - 110 mmol/L Final    CO2 01/11/2022 26  21 - 32 mmol/L Final    Anion Gap 01/11/2022 10  3 - 16 Final    Glucose 01/11/2022 101* 70 - 99 mg/dL Final    BUN 01/11/2022 8  7 - 20 mg/dL Final    CREATININE 01/11/2022 <0.5* 0.6 - 1.1 mg/dL Final    GFR Non- 01/11/2022 >60  >60 Final    Comment: >60 mL/min/1.73m2 EGFR, calc. for ages 25 and older using the  MDRD formula (not corrected for weight), is valid for stable  renal function.       GFR  American 01/11/2022 >60  >60 Final    Comment: Chronic Kidney Disease: less than 60 ml/min/1.73 sq.m. Kidney Failure: less than 15 ml/min/1.73 sq.m. Results valid for patients 18 years and older.  Calcium 01/11/2022 9.2  8.3 - 10.6 mg/dL Final    Total Protein 01/11/2022 7.8  6.4 - 8.2 g/dL Final    Albumin 01/11/2022 4.9  3.4 - 5.0 g/dL Final    Albumin/Globulin Ratio 01/11/2022 1.7  1.1 - 2.2 Final    Total Bilirubin 01/11/2022 0.4  0.0 - 1.0 mg/dL Final    Alkaline Phosphatase 01/11/2022 67  40 - 129 U/L Final    ALT 01/11/2022 11  10 - 40 U/L Final    AST 01/11/2022 13* 15 - 37 U/L Final    Color, UA 01/11/2022 Yellow  Straw/Yellow Final    Clarity, UA 01/11/2022 Clear  Clear Final    Glucose, Ur 01/11/2022 Negative  Negative mg/dL Final    Bilirubin Urine 01/11/2022 Negative  Negative Final    Ketones, Urine 01/11/2022 Negative  Negative mg/dL Final    Specific Gravity, UA 01/11/2022 1.015  1.005 - 1.030 Final    Blood, Urine 01/11/2022 Negative  Negative Final    pH, UA 01/11/2022 7.5  5.0 - 8.0 Final    Protein, UA 01/11/2022 Negative  Negative mg/dL Final    Urobilinogen, Urine 01/11/2022 0.2  <2.0 E.U./dL Final    Nitrite, Urine 01/11/2022 Negative  Negative Final    Leukocyte Esterase, Urine 01/11/2022 Negative  Negative Final    Microscopic Examination 01/11/2022 Not Indicated   Final    Urine Type 01/11/2022 NotGiven   Final    Urine received in a container without preservatives.     hCG Qual 01/11/2022 Negative  Detects HCG level >10 MIU/mL Final    Ethanol Lvl 01/11/2022 None Detected  mg/dL Final    Comment:    None Detected  Conversion factor:  100 mg/dl = .100 g/dl  For Medical Purposes Only      Amphetamine Screen, Urine 01/11/2022 Neg  Negative <1000ng/mL Final    Barbiturate Screen, Ur 01/11/2022 Neg  Negative <200 ng/mL Final    Benzodiazepine Screen, Urine 01/11/2022 Neg  Negative <200 ng/mL Final    Cannabinoid Scrn, Ur 01/11/2022 Neg  Negative <50 well-appearing, in chair, good grooming and good hygiene well-developed, well-nourished  Behavior/Motor:  no abnormalities noted normal gait and station AIMS: 0  Attitude toward examiner:  cooperative, attentive and good eye contact  Speech:  spontaneous, normal rate, normal volume and well articulated  Mood: euthymic  Affect:  mood congruent Anxiety: mild  Hallucinations: Absent  Thought processes:  coherent Attention span, Concentration & Attention:  attention span and concentration were age appropriate  Thought content:  No evidence of delusions OCD: none    Insight: normal insight and judgment Cognition:  oriented to person, place, and time  Fund of Knowledge: average  IQ:average Memory: intact  Suicide:  No specific plan to harm self  Sleep: sleeps through the night  Appetite: ok     Reassess Suicide Risk:  no specific plan to harm self Pt has phone numbers to contact if suicidal thoughts recur and states pt will return to the hospital if suicidal feelings return. Hospital Routine Meds:     paliperidone  6 mg Oral Daily    melatonin  10 mg Oral Nightly      Hospital PRN Meds: acetaminophen, ibuprofen, magnesium hydroxide, nicotine polacrilex, aluminum & magnesium hydroxide-simethicone, hydrOXYzine, OLANZapine **OR** OLANZapine, sterile water, diphenhydrAMINE   Discharge Meds:    Current Discharge Medication List           Details   melatonin 5 MG TBDP disintegrating tablet Take 2 tablets by mouth nightly  Qty: 60 tablet, Refills: 0              Details   paliperidone (INVEGA) 6 MG extended release tablet Take 1 tablet by mouth daily  Qty: 30 tablet, Refills: 0                 Disposition - Residence Home or Self Care       Follow Up:  See Discharge Instructions     Total time with patient was 40 minutes and more than 50 % of that time was spent counseling the patient on their symptoms, treatment and expected goals.      Rachelle Santana [facility]

## 2022-01-25 ENCOUNTER — TELEPHONE (OUTPATIENT)
Dept: FAMILY MEDICINE CLINIC | Age: 22
End: 2022-01-25

## 2022-01-25 ENCOUNTER — HOSPITAL ENCOUNTER (EMERGENCY)
Age: 22
Discharge: HOME | End: 2022-01-25
Payer: COMMERCIAL

## 2022-01-25 VITALS
TEMPERATURE: 98.2 F | HEIGHT: 66 IN | DIASTOLIC BLOOD PRESSURE: 89 MMHG | BODY MASS INDEX: 30.53 KG/M2 | OXYGEN SATURATION: 100 % | RESPIRATION RATE: 16 BRPM | HEART RATE: 83 BPM | SYSTOLIC BLOOD PRESSURE: 143 MMHG | WEIGHT: 190 LBS

## 2022-01-25 DIAGNOSIS — Z20.9 EXPOSURE TO BAT WITHOUT KNOWN BITE: Primary | ICD-10-CM

## 2022-01-25 PROCEDURE — 99284 EMERGENCY DEPT VISIT MOD MDM: CPT

## 2022-01-25 PROCEDURE — 6360000002 HC RX W HCPCS: Performed by: PHYSICIAN ASSISTANT

## 2022-01-25 PROCEDURE — 90375 RABIES IG IM/SC: CPT | Performed by: PHYSICIAN ASSISTANT

## 2022-01-25 PROCEDURE — 90471 IMMUNIZATION ADMIN: CPT | Performed by: PHYSICIAN ASSISTANT

## 2022-01-25 PROCEDURE — 90675 RABIES VACCINE IM: CPT | Performed by: PHYSICIAN ASSISTANT

## 2022-01-25 RX ADMIN — RABIES IMMUNE GLOBULIN (HUMAN) 1800 UNITS: 300 INJECTION, SOLUTION INFILTRATION; INTRAMUSCULAR at 19:09

## 2022-01-25 RX ADMIN — RABIES VACCINE 1 ML: KIT at 19:04

## 2022-01-25 ASSESSMENT — PAIN SCALES - GENERAL: PAINLEVEL_OUTOF10: 2
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
## 2022-01-25 NOTE — TELEPHONE ENCOUNTER
Spoke to Dr. Arleen Gilmore about pt and was advise to go to the ED, Yuliya Justice ) to be evaluated.
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
## 2022-01-26 DIAGNOSIS — Z20.9 EXPOSURE TO BAT WITHOUT KNOWN BITE: ICD-10-CM

## 2022-01-26 NOTE — ED NOTES
Pt instructed to follow up with [facility] for follow up Rabies Vaccine.  Iwona Tiwari.      Jake Jones LPN  29/29/47 [id number]

## 2022-01-26 NOTE — ED NOTES
Pt Rabies Vaccine Protocol faxed to [facility]/Copy sent to Pharmacy. Pt tolerated Vaccine Immunization.      Alexus Barnes LPN  95/78/12 [id number]

## 2022-01-28 ENCOUNTER — HOSPITAL ENCOUNTER (OUTPATIENT)
Dept: NURSING | Age: 22
Setting detail: INFUSION SERIES
Discharge: HOME | End: 2022-01-28
Payer: COMMERCIAL

## 2022-01-28 VITALS
SYSTOLIC BLOOD PRESSURE: 131 MMHG | TEMPERATURE: 98.1 F | RESPIRATION RATE: 18 BRPM | HEART RATE: 84 BPM | BODY MASS INDEX: 30.53 KG/M2 | HEIGHT: 66 IN | OXYGEN SATURATION: 96 % | WEIGHT: 190 LBS | DIASTOLIC BLOOD PRESSURE: 70 MMHG

## 2022-01-28 DIAGNOSIS — Z20.9 EXPOSURE TO BAT WITHOUT KNOWN BITE: Primary | ICD-10-CM

## 2022-01-28 PROCEDURE — 90471 IMMUNIZATION ADMIN: CPT | Performed by: PHYSICIAN ASSISTANT

## 2022-01-28 PROCEDURE — 90675 RABIES VACCINE IM: CPT | Performed by: PHYSICIAN ASSISTANT

## 2022-01-28 PROCEDURE — 6360000002 HC RX W HCPCS: Performed by: PHYSICIAN ASSISTANT

## 2022-01-28 RX ADMIN — RABIES VACCINE 1 ML: KIT at 13:22

## 2022-01-28 ASSESSMENT — PAIN - FUNCTIONAL ASSESSMENT: PAIN_FUNCTIONAL_ASSESSMENT: 0-10

## 2022-01-31 ENCOUNTER — HOSPITAL ENCOUNTER (OUTPATIENT)
Dept: NURSING | Age: 22
Setting detail: INFUSION SERIES
Discharge: HOME | End: 2022-01-31
Payer: COMMERCIAL

## 2022-01-31 VITALS
TEMPERATURE: 98.3 F | SYSTOLIC BLOOD PRESSURE: 136 MMHG | BODY MASS INDEX: 30.53 KG/M2 | HEART RATE: 87 BPM | HEIGHT: 66 IN | WEIGHT: 190 LBS | OXYGEN SATURATION: 99 % | RESPIRATION RATE: 18 BRPM | DIASTOLIC BLOOD PRESSURE: 74 MMHG

## 2022-01-31 DIAGNOSIS — Z20.9 EXPOSURE TO BAT WITHOUT KNOWN BITE: Primary | ICD-10-CM

## 2022-01-31 PROCEDURE — 6360000002 HC RX W HCPCS: Performed by: PHYSICIAN ASSISTANT

## 2022-01-31 PROCEDURE — 90675 RABIES VACCINE IM: CPT | Performed by: PHYSICIAN ASSISTANT

## 2022-01-31 PROCEDURE — 90471 IMMUNIZATION ADMIN: CPT | Performed by: PHYSICIAN ASSISTANT

## 2022-01-31 RX ADMIN — RABIES VACCINE 1 ML: KIT at 13:12

## 2022-01-31 ASSESSMENT — PAIN - FUNCTIONAL ASSESSMENT: PAIN_FUNCTIONAL_ASSESSMENT: 0-10

## 2022-01-31 NOTE — PROGRESS NOTES
Pt tolerated Rabivert injection well into right deltoid muscle (per request). No side effects noted. Discharged ambulatory in baseline good condition.

## 2022-02-08 ENCOUNTER — HOSPITAL ENCOUNTER (OUTPATIENT)
Dept: NURSING | Age: 22
Setting detail: INFUSION SERIES
Discharge: HOME | End: 2022-02-08
Payer: COMMERCIAL

## 2022-02-08 VITALS
HEART RATE: 96 BPM | HEIGHT: 66 IN | DIASTOLIC BLOOD PRESSURE: 79 MMHG | TEMPERATURE: 97.4 F | WEIGHT: 190 LBS | BODY MASS INDEX: 30.53 KG/M2 | OXYGEN SATURATION: 99 % | RESPIRATION RATE: 16 BRPM | SYSTOLIC BLOOD PRESSURE: 135 MMHG

## 2022-02-08 DIAGNOSIS — Z20.9 EXPOSURE TO BAT WITHOUT KNOWN BITE: Primary | ICD-10-CM

## 2022-02-08 PROCEDURE — 90675 RABIES VACCINE IM: CPT | Performed by: PHYSICIAN ASSISTANT

## 2022-02-08 PROCEDURE — 99211 OFF/OP EST MAY X REQ PHY/QHP: CPT

## 2022-02-08 PROCEDURE — G0463 HOSPITAL OUTPT CLINIC VISIT: HCPCS

## 2022-02-08 PROCEDURE — G0453 CONT INTRAOP NEURO MONITOR: HCPCS

## 2022-02-08 PROCEDURE — 90471 IMMUNIZATION ADMIN: CPT | Performed by: PHYSICIAN ASSISTANT

## 2022-02-08 PROCEDURE — 6360000002 HC RX W HCPCS: Performed by: PHYSICIAN ASSISTANT

## 2022-02-08 RX ADMIN — RABIES VACCINE 1 ML: KIT at 13:13

## 2022-02-08 ASSESSMENT — PAIN - FUNCTIONAL ASSESSMENT: PAIN_FUNCTIONAL_ASSESSMENT: 0-10

## 2022-02-22 ENCOUNTER — HOSPITAL ENCOUNTER (INPATIENT)
Age: 22
LOS: 2 days | Discharge: HOME | DRG: 885 | End: 2022-02-24
Attending: PSYCHIATRY & NEUROLOGY | Admitting: PSYCHIATRY & NEUROLOGY
Payer: COMMERCIAL

## 2022-02-22 DIAGNOSIS — R45.850 HOMICIDAL IDEATION: ICD-10-CM

## 2022-02-22 DIAGNOSIS — F22 DELUSION (HCC): Primary | ICD-10-CM

## 2022-02-22 LAB
A/G RATIO: 1.8 (ref 1.1–2.2)
ACETAMINOPHEN LEVEL: <5 UG/ML (ref 10–30)
ALBUMIN SERPL-MCNC: 4.9 G/DL (ref 3.4–5)
ALP BLD-CCNC: 69 U/L (ref 40–129)
ALT SERPL-CCNC: 21 U/L (ref 10–40)
AMPHETAMINE SCREEN, URINE: NORMAL
ANION GAP SERPL CALCULATED.3IONS-SCNC: 12 MMOL/L (ref 3–16)
AST SERPL-CCNC: 20 U/L (ref 15–37)
BARBITURATE SCREEN URINE: NORMAL
BASOPHILS ABSOLUTE: 0.1 K/UL (ref 0–0.2)
BASOPHILS RELATIVE PERCENT: 0.7 %
BENZODIAZEPINE SCREEN, URINE: NORMAL
BILIRUB SERPL-MCNC: 0.3 MG/DL (ref 0–1)
BILIRUBIN URINE: NEGATIVE
BLOOD, URINE: NEGATIVE
BUN BLDV-MCNC: 11 MG/DL (ref 7–20)
CALCIUM SERPL-MCNC: 9.6 MG/DL (ref 8.3–10.6)
CANNABINOID SCREEN URINE: NORMAL
CHLORIDE BLD-SCNC: 100 MMOL/L (ref 99–110)
CLARITY: CLEAR
CO2: 24 MMOL/L (ref 21–32)
COCAINE METABOLITE SCREEN URINE: NORMAL
COLOR: YELLOW
CREAT SERPL-MCNC: <0.5 MG/DL (ref 0.6–1.1)
EOSINOPHILS ABSOLUTE: 0 K/UL (ref 0–0.6)
EOSINOPHILS RELATIVE PERCENT: 0.1 %
ETHANOL: NORMAL MG/DL (ref 0–0.08)
GFR AFRICAN AMERICAN: >60
GFR NON-AFRICAN AMERICAN: >60
GLUCOSE BLD-MCNC: 109 MG/DL (ref 70–99)
GLUCOSE URINE: NEGATIVE MG/DL
HCG QUALITATIVE: NEGATIVE
HCT VFR BLD CALC: 41 % (ref 36–48)
HEMOGLOBIN: 13.7 G/DL (ref 12–16)
INFLUENZA A: NOT DETECTED
INFLUENZA B: NOT DETECTED
KETONES, URINE: NEGATIVE MG/DL
LEUKOCYTE ESTERASE, URINE: NEGATIVE
LYMPHOCYTES ABSOLUTE: 1.2 K/UL (ref 1–5.1)
LYMPHOCYTES RELATIVE PERCENT: 12.1 %
Lab: NORMAL
MCH RBC QN AUTO: 28.7 PG (ref 26–34)
MCHC RBC AUTO-ENTMCNC: 33.4 G/DL (ref 31–36)
MCV RBC AUTO: 86 FL (ref 80–100)
METHADONE SCREEN, URINE: NORMAL
MICROSCOPIC EXAMINATION: NORMAL
MONOCYTES ABSOLUTE: 0.4 K/UL (ref 0–1.3)
MONOCYTES RELATIVE PERCENT: 4.3 %
NEUTROPHILS ABSOLUTE: 7.9 K/UL (ref 1.7–7.7)
NEUTROPHILS RELATIVE PERCENT: 82.8 %
NITRITE, URINE: NEGATIVE
OPIATE SCREEN URINE: NORMAL
OXYCODONE URINE: NORMAL
PDW BLD-RTO: 13.5 % (ref 12.4–15.4)
PH UA: 7.5
PH UA: 7.5 (ref 5–8)
PHENCYCLIDINE SCREEN URINE: NORMAL
PLATELET # BLD: 331 K/UL (ref 135–450)
PMV BLD AUTO: 7 FL (ref 5–10.5)
POTASSIUM REFLEX MAGNESIUM: 3.8 MMOL/L (ref 3.5–5.1)
PROPOXYPHENE SCREEN: NORMAL
PROTEIN UA: NEGATIVE MG/DL
RBC # BLD: 4.76 M/UL (ref 4–5.2)
SALICYLATE, SERUM: <0.3 MG/DL (ref 15–30)
SARS-COV-2 RNA, RT PCR: NOT DETECTED
SODIUM BLD-SCNC: 136 MMOL/L (ref 136–145)
SPECIFIC GRAVITY UA: 1.01 (ref 1–1.03)
TOTAL PROTEIN: 7.7 G/DL (ref 6.4–8.2)
TSH SERPL DL<=0.05 MIU/L-ACNC: 2.03 UIU/ML (ref 0.43–4)
URINE REFLEX TO CULTURE: NORMAL
URINE TYPE: NORMAL
UROBILINOGEN, URINE: 0.2 E.U./DL
WBC # BLD: 9.6 K/UL (ref 4–11)

## 2022-02-22 PROCEDURE — 83036 HEMOGLOBIN GLYCOSYLATED A1C: CPT

## 2022-02-22 PROCEDURE — 1240000000 HC EMOTIONAL WELLNESS R&B

## 2022-02-22 PROCEDURE — 84703 CHORIONIC GONADOTROPIN ASSAY: CPT

## 2022-02-22 PROCEDURE — 80143 DRUG ASSAY ACETAMINOPHEN: CPT

## 2022-02-22 PROCEDURE — 81003 URINALYSIS AUTO W/O SCOPE: CPT

## 2022-02-22 PROCEDURE — 82077 ASSAY SPEC XCP UR&BREATH IA: CPT

## 2022-02-22 PROCEDURE — 99285 EMERGENCY DEPT VISIT HI MDM: CPT

## 2022-02-22 PROCEDURE — 85025 COMPLETE CBC W/AUTO DIFF WBC: CPT

## 2022-02-22 PROCEDURE — 80053 COMPREHEN METABOLIC PANEL: CPT

## 2022-02-22 PROCEDURE — 80061 LIPID PANEL: CPT

## 2022-02-22 PROCEDURE — 84443 ASSAY THYROID STIM HORMONE: CPT

## 2022-02-22 PROCEDURE — 36415 COLL VENOUS BLD VENIPUNCTURE: CPT

## 2022-02-22 PROCEDURE — 87636 SARSCOV2 & INF A&B AMP PRB: CPT

## 2022-02-22 PROCEDURE — 80179 DRUG ASSAY SALICYLATE: CPT

## 2022-02-22 PROCEDURE — 80307 DRUG TEST PRSMV CHEM ANLYZR: CPT

## 2022-02-22 RX ORDER — MAGNESIUM HYDROXIDE/ALUMINUM HYDROXICE/SIMETHICONE 120; 1200; 1200 MG/30ML; MG/30ML; MG/30ML
30 SUSPENSION ORAL EVERY 6 HOURS PRN
Status: DISCONTINUED | OUTPATIENT
Start: 2022-02-22 | End: 2022-02-24 | Stop reason: HOSPADM

## 2022-02-22 RX ORDER — IBUPROFEN 400 MG/1
400 TABLET ORAL EVERY 6 HOURS PRN
Status: DISCONTINUED | OUTPATIENT
Start: 2022-02-22 | End: 2022-02-24 | Stop reason: HOSPADM

## 2022-02-22 RX ORDER — ACETAMINOPHEN 325 MG/1
650 TABLET ORAL EVERY 4 HOURS PRN
Status: DISCONTINUED | OUTPATIENT
Start: 2022-02-22 | End: 2022-02-24 | Stop reason: HOSPADM

## 2022-02-22 RX ORDER — LANOLIN ALCOHOL/MO/W.PET/CERES
3 CREAM (GRAM) TOPICAL NIGHTLY PRN
Status: DISCONTINUED | OUTPATIENT
Start: 2022-02-22 | End: 2022-02-24 | Stop reason: HOSPADM

## 2022-02-22 RX ORDER — POLYETHYLENE GLYCOL 3350 17 G
2 POWDER IN PACKET (EA) ORAL
Status: DISCONTINUED | OUTPATIENT
Start: 2022-02-22 | End: 2022-02-24 | Stop reason: HOSPADM

## 2022-02-22 RX ORDER — OLANZAPINE 10 MG/1
10 INJECTION, POWDER, LYOPHILIZED, FOR SOLUTION INTRAMUSCULAR EVERY 8 HOURS PRN
Status: DISCONTINUED | OUTPATIENT
Start: 2022-02-22 | End: 2022-02-24 | Stop reason: HOSPADM

## 2022-02-22 RX ORDER — MIRTAZAPINE 7.5 MG/1
7.5 TABLET, FILM COATED ORAL NIGHTLY
Status: ON HOLD | COMMUNITY
End: 2022-02-23

## 2022-02-22 RX ORDER — PALIPERIDONE 3 MG/1
9 TABLET, EXTENDED RELEASE ORAL DAILY
Status: CANCELLED | OUTPATIENT
Start: 2022-02-22

## 2022-02-22 RX ORDER — OLANZAPINE 10 MG/1
10 TABLET ORAL EVERY 8 HOURS PRN
Status: DISCONTINUED | OUTPATIENT
Start: 2022-02-22 | End: 2022-02-24 | Stop reason: HOSPADM

## 2022-02-22 RX ORDER — HYDROXYZINE PAMOATE 50 MG/1
50 CAPSULE ORAL 3 TIMES DAILY PRN
Status: DISCONTINUED | OUTPATIENT
Start: 2022-02-22 | End: 2022-02-24 | Stop reason: HOSPADM

## 2022-02-22 RX ORDER — NICOTINE 21 MG/24HR
1 PATCH, TRANSDERMAL 24 HOURS TRANSDERMAL DAILY
Status: DISCONTINUED | OUTPATIENT
Start: 2022-02-22 | End: 2022-02-24

## 2022-02-22 RX ORDER — DIPHENHYDRAMINE HYDROCHLORIDE 50 MG/ML
50 INJECTION INTRAMUSCULAR; INTRAVENOUS EVERY 4 HOURS PRN
Status: DISCONTINUED | OUTPATIENT
Start: 2022-02-22 | End: 2022-02-24 | Stop reason: HOSPADM

## 2022-02-22 SDOH — HEALTH STABILITY: MENTAL HEALTH: HOW OFTEN DO YOU HAVE A DRINK CONTAINING ALCOHOL?: NEVER

## 2022-02-22 SDOH — SOCIAL STABILITY: SOCIAL NETWORK: HOW OFTEN DO YOU GET TOGETHER WITH FRIENDS OR RELATIVES?: NEVER

## 2022-02-22 SDOH — SOCIAL STABILITY: SOCIAL INSECURITY: WITHIN THE LAST YEAR, HAVE YOU BEEN AFRAID OF YOUR PARTNER OR EX-PARTNER?: NO

## 2022-02-22 SDOH — ECONOMIC STABILITY: INCOME INSECURITY: HOW HARD IS IT FOR YOU TO PAY FOR THE VERY BASICS LIKE FOOD, HOUSING, MEDICAL CARE, AND HEATING?: NOT HARD AT ALL

## 2022-02-22 SDOH — ECONOMIC STABILITY: HOUSING INSECURITY
IN THE LAST 12 MONTHS, WAS THERE A TIME WHEN YOU DID NOT HAVE A STEADY PLACE TO SLEEP OR SLEPT IN A SHELTER (INCLUDING NOW)?: NO

## 2022-02-22 SDOH — ECONOMIC STABILITY: INCOME INSECURITY: IN THE LAST 12 MONTHS, WAS THERE A TIME WHEN YOU WERE NOT ABLE TO PAY THE MORTGAGE OR RENT ON TIME?: NO

## 2022-02-22 SDOH — ECONOMIC STABILITY: FOOD INSECURITY: WITHIN THE PAST 12 MONTHS, YOU WORRIED THAT YOUR FOOD WOULD RUN OUT BEFORE YOU GOT MONEY TO BUY MORE.: NEVER TRUE

## 2022-02-22 SDOH — HEALTH STABILITY: MENTAL HEALTH
STRESS IS WHEN SOMEONE FEELS TENSE, NERVOUS, ANXIOUS, OR CAN'T SLEEP AT NIGHT BECAUSE THEIR MIND IS TROUBLED. HOW STRESSED ARE YOU?: NOT AT ALL

## 2022-02-22 SDOH — SOCIAL STABILITY: SOCIAL NETWORK
DO YOU BELONG TO ANY CLUBS OR ORGANIZATIONS SUCH AS CHURCH GROUPS UNIONS, FRATERNAL OR ATHLETIC GROUPS, OR SCHOOL GROUPS?: NO

## 2022-02-22 SDOH — SOCIAL STABILITY: SOCIAL INSECURITY
WITHIN THE LAST YEAR, HAVE TO BEEN RAPED OR FORCED TO HAVE ANY KIND OF SEXUAL ACTIVITY BY YOUR PARTNER OR EX-PARTNER?: NO

## 2022-02-22 SDOH — SOCIAL STABILITY: SOCIAL NETWORK: ARE YOU MARRIED, WIDOWED, DIVORCED, SEPARATED, NEVER MARRIED, OR LIVING WITH A PARTNER?: NEVER MARRIED

## 2022-02-22 SDOH — SOCIAL STABILITY: SOCIAL INSECURITY: WITHIN THE LAST YEAR, HAVE YOU BEEN HUMILIATED OR EMOTIONALLY ABUSED IN OTHER WAYS BY YOUR PARTNER OR EX-PARTNER?: NO

## 2022-02-22 SDOH — ECONOMIC STABILITY: TRANSPORTATION INSECURITY
IN THE PAST 12 MONTHS, HAS THE LACK OF TRANSPORTATION KEPT YOU FROM MEDICAL APPOINTMENTS OR FROM GETTING MEDICATIONS?: NO

## 2022-02-22 SDOH — SOCIAL STABILITY: SOCIAL NETWORK: HOW OFTEN DO YOU ATTEND CHURCH OR RELIGIOUS SERVICES?: NEVER

## 2022-02-22 SDOH — SOCIAL STABILITY: SOCIAL INSECURITY
WITHIN THE LAST YEAR, HAVE YOU BEEN KICKED, HIT, SLAPPED, OR OTHERWISE PHYSICALLY HURT BY YOUR PARTNER OR EX-PARTNER?: NO

## 2022-02-22 SDOH — SOCIAL STABILITY: SOCIAL NETWORK: HOW OFTEN DO YOU ATTENT MEETINGS OF THE CLUB OR ORGANIZATION YOU BELONG TO?: NEVER

## 2022-02-22 SDOH — ECONOMIC STABILITY: HOUSING INSECURITY: IN THE LAST 12 MONTHS, HOW MANY PLACES HAVE YOU LIVED?: 1

## 2022-02-22 SDOH — SOCIAL STABILITY: SOCIAL NETWORK: IN A TYPICAL WEEK, HOW MANY TIMES DO YOU TALK ON THE PHONE WITH FAMILY, FRIENDS, OR NEIGHBORS?: NEVER

## 2022-02-22 SDOH — ECONOMIC STABILITY: FOOD INSECURITY: WITHIN THE PAST 12 MONTHS, THE FOOD YOU BOUGHT JUST DIDN'T LAST AND YOU DIDN'T HAVE MONEY TO GET MORE.: NEVER TRUE

## 2022-02-22 SDOH — ECONOMIC STABILITY: TRANSPORTATION INSECURITY
IN THE PAST 12 MONTHS, HAS LACK OF TRANSPORTATION KEPT YOU FROM MEETINGS, WORK, OR FROM GETTING THINGS NEEDED FOR DAILY LIVING?: NO

## 2022-02-22 SDOH — HEALTH STABILITY: PHYSICAL HEALTH: ON AVERAGE, HOW MANY MINUTES DO YOU ENGAGE IN EXERCISE AT THIS LEVEL?: 0 MIN

## 2022-02-22 SDOH — HEALTH STABILITY: PHYSICAL HEALTH: ON AVERAGE, HOW MANY DAYS PER WEEK DO YOU ENGAGE IN MODERATE TO STRENUOUS EXERCISE (LIKE A BRISK WALK)?: 0 DAYS

## 2022-02-22 ASSESSMENT — SLEEP AND FATIGUE QUESTIONNAIRES
DO YOU HAVE DIFFICULTY SLEEPING: YES
SLEEP PATTERN: DIFFICULTY FALLING ASLEEP;DISTURBED/INTERRUPTED SLEEP;INSOMNIA
DO YOU USE A SLEEP AID: NO
DIFFICULTY ARISING: NO
AVERAGE NUMBER OF SLEEP HOURS: 3
DIFFICULTY STAYING ASLEEP: YES
RESTFUL SLEEP: NO
DIFFICULTY FALLING ASLEEP: YES

## 2022-02-22 ASSESSMENT — LIFESTYLE VARIABLES: HISTORY_ALCOHOL_USE: NO

## 2022-02-22 ASSESSMENT — PAIN SCALES - GENERAL: PAINLEVEL_OUTOF10: 0

## 2022-02-22 NOTE — ED NOTES
Level of Care Disposition: Admit      Patient was seen by ED provider and [facility]FFILIATE OF [facility] ER staff. The case presented to psychiatric provider on-call Dr. Shi Viera. Based on the ED evaluation and information presented to the provider by this writer, it is the recommendation of the on call psychiatric provider that inpatient hospitalization is the least restrictive environment for the patient at this time. The patient will be admitted to the inpatient unit.       [address] [place] [address]  02/22/22 [id number]

## 2022-02-22 NOTE — ED NOTES
[facility] unit aware that pt is enroute to unit. Security called and will be down shortly to transport pt to [facility], sitter with this pt will continue with pt to [facility] unit.       Allen Ambrosio RN  02/22/22 [id number]

## 2022-02-22 NOTE — ED NOTES
1541- [facility] for a consult. Cherelle Fisher  02/22/2022    1546- canceled consult, they are a behavior patient.       Cherelle Fisher  02/22/22 1548
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
## 2022-02-22 NOTE — ED NOTES
Pt reports that psychiatrist believes that pt was going to harm themself or others. However, pt denies any SI or HI ideations at this time. EMS brought pt to hospital from psychiatrist office. Sitter was called to be at bedside with pt. Pt's belongings were obtained, documented and placed at nurses station. Pt placed in safety gown.       Dov Ovalle RN  02/22/22 8065

## 2022-02-23 PROBLEM — F60.3 BORDERLINE PERSONALITY DISORDER (HCC): Chronic | Status: ACTIVE | Noted: 2022-02-23

## 2022-02-23 PROBLEM — F25.0 SCHIZOAFFECTIVE DISORDER, BIPOLAR TYPE (HCC): Status: ACTIVE | Noted: 2022-02-23

## 2022-02-23 PROBLEM — F43.10 PTSD (POST-TRAUMATIC STRESS DISORDER): Chronic | Status: ACTIVE | Noted: 2022-02-23

## 2022-02-23 LAB
CHOLESTEROL, TOTAL: 156 MG/DL (ref 0–199)
HDLC SERPL-MCNC: 68 MG/DL (ref 40–60)
LDL CHOLESTEROL CALCULATED: 78 MG/DL
TRIGL SERPL-MCNC: 49 MG/DL (ref 0–150)
VLDLC SERPL CALC-MCNC: 10 MG/DL

## 2022-02-23 PROCEDURE — 99223 1ST HOSP IP/OBS HIGH 75: CPT | Performed by: PSYCHIATRY & NEUROLOGY

## 2022-02-23 PROCEDURE — 99221 1ST HOSP IP/OBS SF/LOW 40: CPT | Performed by: NURSE PRACTITIONER

## 2022-02-23 PROCEDURE — 1240000000 HC EMOTIONAL WELLNESS R&B

## 2022-02-23 PROCEDURE — 6370000000 HC RX 637 (ALT 250 FOR IP): Performed by: PSYCHIATRY & NEUROLOGY

## 2022-02-23 RX ORDER — HALOPERIDOL 1 MG/1
2 TABLET ORAL 3 TIMES DAILY
Status: DISCONTINUED | OUTPATIENT
Start: 2022-02-23 | End: 2022-02-24

## 2022-02-23 RX ORDER — PALIPERIDONE 3 MG/1
9 TABLET, EXTENDED RELEASE ORAL DAILY
Status: DISCONTINUED | OUTPATIENT
Start: 2022-02-23 | End: 2022-02-23

## 2022-02-23 RX ORDER — PALIPERIDONE 3 MG/1
6 TABLET, EXTENDED RELEASE ORAL DAILY
Status: DISCONTINUED | OUTPATIENT
Start: 2022-02-23 | End: 2022-02-23

## 2022-02-23 RX ORDER — PALIPERIDONE 9 MG/1
9 TABLET, EXTENDED RELEASE ORAL DAILY
Status: ON HOLD | COMMUNITY
Start: 2022-02-17 | End: 2022-02-24 | Stop reason: HOSPADM

## 2022-02-23 RX ADMIN — HYDROXYZINE PAMOATE 50 MG: 50 CAPSULE ORAL at 00:11

## 2022-02-23 RX ADMIN — HALOPERIDOL 2 MG: 1 TABLET ORAL at 16:21

## 2022-02-23 RX ADMIN — PALIPERIDONE 9 MG: 3 TABLET, EXTENDED RELEASE ORAL at 08:33

## 2022-02-23 ASSESSMENT — LIFESTYLE VARIABLES: HISTORY_ALCOHOL_USE: NO

## 2022-02-23 ASSESSMENT — SLEEP AND FATIGUE QUESTIONNAIRES
DIFFICULTY ARISING: YES
DO YOU USE A SLEEP AID: NO
RESTFUL SLEEP: NO
SLEEP PATTERN: DIFFICULTY FALLING ASLEEP;DIFFICULTY ARISING;DISTURBED/INTERRUPTED SLEEP;EARLY AWAKENING;INSOMNIA;RESTLESSNESS
DIFFICULTY FALLING ASLEEP: YES
DO YOU HAVE DIFFICULTY SLEEPING: YES
AVERAGE NUMBER OF SLEEP HOURS: -9
DIFFICULTY STAYING ASLEEP: YES

## 2022-02-23 ASSESSMENT — PAIN SCALES - GENERAL: PAINLEVEL_OUTOF10: 0

## 2022-02-23 NOTE — PLAN OF CARE
Problem: Depressive Behavior With or Without Suicide Precautions:  Goal: Able to verbalize support systems  Description: Able to verbalize support systems  Outcome: Ongoing     Problem: Depressive Behavior With or Without Suicide Precautions:  Goal: Absence of self-harm  Description: Absence of self-harm  2/23/2022 1342 by Vazquez Manzano LPN  Outcome: Ongoing   Mirian Schmidt has been isolative to room all shift. Patients mood has been unstable at times. Patient will talk and answer assessment questions then later be very vague and use one word answers to questions. Patient was at one point sitting under desk in room with legs crossed rocking. When ask how patient was feeling and stated \" I am okay. .. I just feel better sitting here\" patient sat for about an hour in that position then got up into the bed. Patient has been calm and cooperative. 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 And work has been busy. Patient appeared brighter while talking about job and engaging in conversation. Will continue to monitor for safety.

## 2022-02-23 NOTE — FLOWSHEET NOTE
02/23/22 1001   Psychiatric History   Psychiatric history treatment Psychiatric admissions  (here last month)   Are there any medication issues?  Yes  (allergic to Abilify)   Support System   Support system None  (room mate)   Problems in support system Alienated/estranged   Current Living Situation   Home Living Adequate   Living information Lives with others   Problems with living situation  No   Lack of basic needs No   Lacking basic needs   (none)   SSDI/SSI none   Other government assistance none   Problems with environment none   Current abuse issues no   Supervised setting None   Relationship problems No   Contact information roommate/fileonarda High refused to give phone number for her   Medical and Self-Care Issues   Relevant medical problems no   Relevant self-care issues no   Barriers to treatment No   Family Constellation   Spouse/partner-name/age roommate/fileonarda High refused to give phone number for her   Children-names/ages no   Parents estranged   Siblings sister estranged   Contact information n/a   Support services   (none)   Childhood   Raised by Biological father;Biological mother;Grandparent(s)   Biological mother refused to give names   Biological father refused to give names   Grandparent(s) refused to give names   Relevant family history mom has depression bi-polar with psycosis   History of abuse No   Legal History   Legal history No   Juvenile legal history No    Abuse Assessment   Physical Abuse Denies   Verbal Abuse Denies   Emotional abuse Denies   Financial Abuse Denies   Sexual abuse Denies   Elder abuse No   Substance Use   Use of substances  No   Motivation for SA Treatment   Stage of engagement   (n/a)   Motivation for treatment No   Current barriers to treatment   (n/a)   Education   Education   (in college)   Special education   (504-plan)   Work History   Currently employed Yes   Recent job loss or change   (n/a)   [facility]   (n/a)   [facility]
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
## 2022-02-23 NOTE — PROGRESS NOTES
Behavioral Services  Medicare Certification Upon Admission    I certify that this patient's inpatient psychiatric hospital admission is medically necessary for:    [x] (1) Treatment which could reasonably be expected to improve this patient's condition,       [x] (2) Or for diagnostic study;     AND     [x](2) The inpatient psychiatric services are provided while the individual is under the care of a physician and are included in the individualized plan of care.     Estimated length of stay/service 2 d     Plan for post-hospital care [facility]    Electronically signed by Guanakito Gudino MD on 2/23/2022 at 2:04 PM
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
## 2022-02-23 NOTE — PLAN OF CARE
Problem: Depressive Behavior With or Without Suicide Precautions:  Goal: Able to verbalize acceptance of life and situations over which he or she has no control  Description: Able to verbalize acceptance of life and situations over which he or she has no control  Outcome: Ongoing  Goal: Absence of self-harm  Description: Absence of self-harm  Outcome: Ongoing     Patient up ad nahomi. Seclusive to room. Sat under desk through evening. Pt brightened and smiling, incongruent behaviors. Pt received vistaril for anxiety and sleep, only with salt packets given to get rid of the demons. Once patient received vistaril, with much encouragement, pt got into bed and fell asleep shortly after. Pt slept throughout night with no further issues. Monitored for safety and comfort.

## 2022-02-24 VITALS
WEIGHT: 190 LBS | RESPIRATION RATE: 16 BRPM | SYSTOLIC BLOOD PRESSURE: 132 MMHG | HEIGHT: 68 IN | TEMPERATURE: 98.2 F | HEART RATE: 84 BPM | OXYGEN SATURATION: 98 % | BODY MASS INDEX: 28.79 KG/M2 | DIASTOLIC BLOOD PRESSURE: 75 MMHG

## 2022-02-24 LAB
ESTIMATED AVERAGE GLUCOSE: 108.3 MG/DL
HBA1C MFR BLD: 5.4 %

## 2022-02-24 PROCEDURE — 5130000000 HC BRIDGE APPOINTMENT

## 2022-02-24 PROCEDURE — 99239 HOSP IP/OBS DSCHRG MGMT >30: CPT | Performed by: PSYCHIATRY & NEUROLOGY

## 2022-02-24 RX ORDER — MECOBALAMIN 5000 MCG
10 TABLET,DISINTEGRATING ORAL NIGHTLY
Qty: 60 TABLET | Refills: 0 | Status: SHIPPED | OUTPATIENT
Start: 2022-02-24 | End: 2022-03-11 | Stop reason: ALTCHOICE

## 2022-02-24 ASSESSMENT — ENCOUNTER SYMPTOMS
COLOR CHANGE: 0
SORE THROAT: 0
ABDOMINAL PAIN: 0
RHINORRHEA: 0
SHORTNESS OF BREATH: 0

## 2022-02-24 NOTE — DISCHARGE SUMMARY
Discharge Summary    Date: 2/24/2022  Patient Name: Nirmala Piedra YOB: 2000 Age: 25 y.o. 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 [facility] Stay  Narrative of Hospital Course:  HPI AT ADMISSION:  Carmen Zuniga is a 25year old patient who was admitted for psychotic disorder in January and discharged on Invega. He reports that his delusions did not fully resolve but were improved. Last week, he was started on Remeron and shortly afterward stopped sleeping, had racing thoughts and delusions of a \"Djinn\" who was in his mind. He did not sleep at all for 3 days. He came to his follow-up appointment and talked about delusions and alarmed the prescriber, so he was brought to the ER. The delusional material was as follows:  He has this \"holy salt\" that caused a rainstorm to happen last week. He sprinkled the holy salt in the woods. He believes that if the Djinn took over, his real body would still be in the woods. He was planning to go to the woods to see if his real body was still there.  Syeda Lyman says he still believes in the [place]. He is hypomanic-appearing, but not as psychotic as he was during his first admission. He has no thoughts of self-harm, no suicidal ideation, no homicidal ideation. He wants to be discharged. \"    HOSPITAL COURSE:    2/23:    Mery George reveals that he was placed on Remeron the week before admission.   A few days after starting it he stopped HOSPITALIST    Surgeries/procedures Performed:       Treatments:    Therapies    OT and SW    Discharge Plan/Disposition:  Home    Hospital/Incidental Findings Requiring Follow Up:    Patient Instructions:    Diet: Regular Diet    Activity:Activity as Tolerated  For number of days (if applicable): Other Instructions:    Provider Follow-Up:   No follow-ups on file.      Significant Diagnostic Studies:    Recent Labs:  Admission on 02/22/2022WBC                                           Date: 02/22/2022Value: 9.6         Ref range: 4.0 - 11.0 K/uL    Status: FinalRBC                                           Date: 02/22/2022Value: 4.76        Ref range: 4.00 - 5.20 M/uL   Status: FinalHemoglobin                                    Date: 02/22/2022Value: 13.7        Ref range: 12.0 - 16.0 g/dL   Status: FinalHematocrit                                    Date: 02/22/2022Value: 41.0        Ref range: 36.0 - 48.0 %      Status: FinalMCV                                           Date: 02/22/2022Value: 86.0        Ref range: 80.0 - 100.0 fL    Status: [address]                                           Date: 02/22/2022Value: 28.7        Ref range: 26.0 - 34.0 pg     Status: [address]                                          Date: 02/22/2022Value: 33.4        Ref range: 31.0 - 36.0 g/dL   Status: FinalRDW                                           Date: 02/22/2022Value: 13.5        Ref range: 12.4 - 15.4 %      Status: FinalPlatelets                                     Date: 02/22/2022Value: 331         Ref range: 135 - 450 K/uL     Status: FinalMPV                                           Date: 02/22/2022Value: 7.0         Ref range: 5.0 - 10.5 fL      Status: FinalNeutrophils %                                 Date: 02/22/2022Value: 82.8        Ref range: %                  Status: FinalLymphocytes %                                 Date: 02/22/2022Value: 12.1        Ref range: %                  Status: FinalMonocytes % Date: 02/22/2022Value: 4.3         Ref range: %                  Status: FinalEosinophils %                                 Date: 02/22/2022Value: 0.1         Ref range: %                  Status: FinalBasophils %                                   Date: 02/22/2022Value: 0.7         Ref range: %                  Status: FinalNeutrophils Absolute                          Date: 02/22/2022Value: 7.9*        Ref range: 1.7 - 7.7 K/uL     Status: FinalLymphocytes Absolute                          Date: 02/22/2022Value: 1.2         Ref range: 1.0 - 5.1 K/uL     Status: FinalMonocytes Absolute                            Date: 02/22/2022Value: 0.4         Ref range: 0.0 - 1.3 K/uL     Status: FinalEosinophils Absolute                          Date: 02/22/2022Value: 0.0         Ref range: 0.0 - 0.6 K/uL     Status: FinalBasophils Absolute                            Date: 02/22/2022Value: 0.1         Ref range: 0.0 - 0.2 K/uL     Status: FinalSodium                                        Date: 02/22/2022Value: 136         Ref range: 136 - 145 mmol/L   Status: FinalPotassium reflex Magnesium                    Date: 02/22/2022Value: 3.8         Ref range: 3.5 - 5.1 mmol/L   Status: FinalChloride                                      Date: 02/22/2022Value: 100         Ref range: 99 - 110 mmol/L    Status: FinalCO2                                           Date: 02/22/2022Value: 24          Ref range: 21 - 32 mmol/L     Status: FinalAnion Gap                                     Date: 02/22/2022Value: 12          Ref range: 3 - 16             Status: FinalGlucose                                       Date: 02/22/2022Value: 109*        Ref range: 70 - 99 mg/dL      Status: FinalBUN                                           Date: 02/22/2022Value: 11          Ref range: 7 - 20 mg/dL       Status: FinalCREATININE                                    Date: 02/22/2022Value: <0.5*       Ref range: 0.6 - 1.1 mg/dL    Status: FinalGFR Non-                      Date: 02/22/2022Value: >60         Ref range: >60                Status: Final              Comment: >60 mL/min/1.73m2 EGFR, calc. for ages 25 and older using theMDRD formula (not corrected for weight), is valid for stablerenal function. GFR                           Date: 02/22/2022Value: >60         Ref range: >60                Status: Final              Comment: Chronic Kidney Disease: less than 60 ml/min/1.73 sq.m. Kidney Failure: less than 15 ml/min/1.73 sq. m. Results valid for patients 18 years and older. Calcium                                       Date: 02/22/2022Value: 9.6         Ref range: 8.3 - 10.6 mg/dL   Status: FinalTotal Protein                                 Date: 02/22/2022Value: 7.7         Ref range: 6.4 - 8.2 g/dL     Status: FinalAlbumin                                       Date: 02/22/2022Value: 4.9         Ref range: 3.4 - 5.0 g/dL     Status: FinalAlbumin/Globulin Ratio                        Date: 02/22/2022Value: 1.8         Ref range: 1.1 - 2.2          Status: FinalTotal Bilirubin                               Date: 02/22/2022Value: 0.3         Ref range: 0.0 - 1.0 mg/dL    Status: FinalAlkaline Phosphatase                          Date: 02/22/2022Value: 69          Ref range: 40 - 129 U/L       Status: FinalALT                                           Date: 02/22/2022Value: 21          Ref range: 10 - 40 U/L        Status: FinalAST                                           Date: 02/22/2022Value: 20          Ref range: 15 - 37 U/L        Status: FinalColor, UA                                     Date: 02/22/2022Value: Yellow      Ref range: Straw/Yellow       Status: FinalClarity, UA                                   Date: 02/22/2022Value: Clear       Ref range: Clear              Status: FinalGlucose, Ur                                   Date: 02/22/2022Value: Negative    Ref range: Negative mg/dL     Status: FinalBilirubin Urine                               Date: 02/22/2022Value: Negative    Ref range: Negative           Status: FinalKetones, Urine                                Date: 02/22/2022Value: Negative    Ref range: Negative mg/dL     Status: FinalSpecific Gravity, UA                          Date: 02/22/2022Value: 1.010       Ref range: 1.005 - 1.030      Status: FinalBlood, Urine                                  Date: 02/22/2022Value: Negative    Ref range: Negative           Status: FinalpH, UA                                        Date: 02/22/2022Value: 7.5         Ref range: 5.0 - 8.0          Status: FinalProtein, UA                                   Date: 02/22/2022Value: Negative    Ref range: Negative mg/dL     Status: FinalUrobilinogen, Urine                           Date: 02/22/2022Value: 0.2         Ref range: <2.0 E.U./dL       Status: FinalNitrite, Urine                                Date: 02/22/2022Value: Negative    Ref range: Negative           Status: FinalLeukocyte Esterase, Urine                     Date: 02/22/2022Value: Negative    Ref range: Negative           Status: FinalMicroscopic Examination                       Date: 02/22/2022Value: Not Indicated                     Status: FinalUrine Type                                    Date: 02/22/2022Value: NotGiven      Status: Final              Comment: Urine received in a container without preservatives. Urine Reflex to Culture                       Date: 02/22/2022Value: Not Indicated                     Status: Maria C Rim                                      Date: 02/22/2022Value: Negative    Ref range: Detects HCG leve*  Status: FinalAcetaminophen Level                           Date: 02/22/2022Value: <5*         Ref range: 10 - 30 ug/mL      Status: Final              Comment: Therapeutic Range: 10.0-30.0 ug/mLToxic: >=150 ug/mLEthanol Lvl                                   Date: 02/22/2022Value: None Detected Ref range: mg/dL              Status: Final              Comment:    None DetectedConversion factor:100 mg/dl = .100 g/dlFor Medical Purposes OnlySalicylate, Serum                             Date: 02/22/2022Value: <0.3*       Ref range: 15.0 - 30.0 mg/dL  Status: Final              Comment: Therapeutic Range: 15.0-30.0 mg/dLToxic: >30.0 mg/kGACSN-FvV-6 RNA, RT PCR                        Date: 02/22/2022Value: NOT DETECTED                   Ref range: NOT DETECTED       Status: Final              Comment: Not Detected results do not preclude SARS-CoV-2 infection andshould not be used as the sole basis for patient managementdecisions. Results must be combined with clinical observations,patient history, and epidemiological information. Testing was performed using YUSEF RACQUEL SARS-CoV-2 and Influenza A/Bnucleic acid assay. This test is a multiplex Real-Time ReverseTranscriptase Polymerase Chain Reaction (RT-PCR)-based in vitrodiagnostic test intended for the qualitative detection of nucleicacids from SARS-CoV-2, influenza A, and influenza B in nasopharyngealand nasal swab specimens for use under the FDAs Emergency UseAuthorization (EUA) only. Patient Fact Sheet:  https://aguilera.net/ Fact Sheet: http://www.Seeo/: https://www.fda.gov/media/639107/downloadIFU: https://www.fda.gov/media/424283/downloadMethodology:  RT-PCRINFLUENZA A                                   Date: 02/22/2022Value: NOT DETECTED                   Ref range: NOT DETECTED       Status: FinalINFLUENZA B                                   Date: 02/22/2022Value: NOT DETECTED                   Ref range: NOT DETECTED       Status: FinalAmphetamine Screen, Urine                     Date: 02/22/2022Value: Neg         Ref range: Negative <1000ng*  Status: FinalBarbiturate Screen, Ur                        Date: 02/22/2022Value: Neg         Ref range: Negative <200 ng*  Status: FinalBenzodiazepine Screen, Urine                  Date: 02/22/2022Value: Neg         Ref range: Negative <200 ng*  Status: FinalCannabinoid Scrn, Ur                          Date: 02/22/2022Value: Neg         Ref range: Negative <50 ng/*  Status: FinalCocaine Metabolite Screen, Urine              Date: 02/22/2022Value: Neg         Ref range: Negative <300 ng*  Status: FinalOpiate Scrn, Ur                               Date: 02/22/2022Value: Neg         Ref range: Negative <300 ng*  Status: Final              Comment: \"Therapeutic levels of pain medication, especially oxycontin and syntheticopioids, may not be detected by this Methodology. Pain management screenpanel  Drug panel-PM-Hi Res Ur, Interp (PAIN) should be considered for drugmonitoring \". PCP Screen, Urine                             Date: 02/22/2022Value: Neg         Ref range: Negative <25 ng/*  Status: FinalMethadone Screen, Urine                       Date: 02/22/2022Value: Neg         Ref range: Negative <300 ng*  Status: FinalPropoxyphene Scrn, Ur                         Date: 02/22/2022Value: Neg         Ref range: Negative <300 ng*  Status: FinalOxycodone Urine                               Date: 02/22/2022Value: Neg         Ref range: Negative <100 ng*  Status: FinalpH, UA                                        Date: 02/22/2022Value: 7.5           Status: Final              Comment: Urine pH less than 5.0 or greater than 8.0 may indicate sample adulteration. Another sample should be collected if clinicallyindicated. Drug Screen Comment:                          Date: 02/22/2022Value: see below     Status: Final              Comment: This method is a screening test to detect only these drugclasses as part of a medical workup. Confirmatory testingby another method should be ordered if clinically indicated. TSH                                           Date: 02/22/2022Value: 2.03        Ref range: 0.43 - 4.00 uIU/*  Status: FinalCholesterol, Total Date: 02/22/2022Value: 156         Ref range: 0 - 199 mg/dL      Status: FinalTriglycerides                                 Date: 02/22/2022Value: 49          Ref range: 0 - 150 mg/dL      Status: FinalHDL                                           Date: 02/22/2022Value: 68*         Ref range: 40 - 60 mg/dL      Status: FinalLDL Calculated                                Date: 02/22/2022Value: 78          Ref range: <100 mg/dL         Status: FinalVLDL Cholesterol Calculated                   Date: 02/22/2022Value: 10          Ref range: Not Established *  Status: FinalHemoglobin A1C                                Date: 02/22/2022Value: 5.4         Ref range: See comment %      Status: Final              Comment: Comment:Diagnosis of Diabetes: > or = 6.5%Increased risk of diabetes (Prediabetes): 5.7-6.4%Glycemic Control: Nonpregnant Adults: <7.0%                  Pregnant: <6.0%eAG                                           Date: 02/22/2022Value: 108. 3       Ref range: mg/dL              Status: Final------------    Radiology last 7 days:  No results found.      [unfilled]    Discharge Medications    Current Discharge Medication ListSTART taking these medicationspaliperidone palmitate ER (INVEGA SUSTENNA) 156 MG/ML RALF IM injectionInject 156 mg into the muscle once for 1 doseQty: 1 each Refills: 0    Current Discharge Medication ListCONTINUE these medications which have CHANGEDmelatonin 5 MG TBDP disintegrating tabletTake 2 tablets by mouth nightlyQty: 60 tablet Refills: 0    Current Discharge Medication List    Current Discharge Medication ListSTOP taking these medicationspaliperidone (INVEGA) 9 MG extended release tabletComments:Reason for Stopping:mirtazapine (REMERON) 7.5 MG tabletComments:Reason for Stopping:paliperidone (INVEGA) 6 MG extended release tabletComments:Reason for Stopping:    Time Spent on Discharge:4E] minutes were spent in patient examination, evaluation, counseling as well as medication reconciliation, prescriptions for required medications, discharge plan, and follow up. Electronically signed by Roxie Perera MD on 2/24/22 at 12:01 PM EST       Discharge process, including interview, coordination of care, prescribing and documentation was 40 minutes in length.

## 2022-02-24 NOTE — BH NOTE
[facility]  Discharge Note    Pt discharged with followings belongings:   Dental Appliances: None  Vision - Corrective Lenses: Glasses  Hearing Aid: None  Jewelry: None  Body Piercings Removed: N/A  Clothing: Jacket / coat,Shirt,Pants,Footwear,Socks,Undergarments (Comment)  Were All Patient Medications Collected?: Not Applicable  Other Valuables: Cell phone,Keys (2 credit/debit cards)   Valuables returned to patient. Patient education on aftercare instructions: yes  Information faxed [facility] attn Milan Cobb [phone]  [facility] attn Aric Bauer [phone]  by Caroll Felty RN  at 10:54 AM   Copy of Aaron Contreras prescription sent to [facility] office. Patient verbalize understanding of AVS:  yes.     Status EXAM upon discharge:  Status and Exam  Normal: No  Facial Expression: Avoids Gaze,Flat  Affect: Blunt  Level of Consciousness: Alert  Mood:Normal: Yes  Motor Activity:Normal: Yes  Interview Behavior: Cooperative  Preception: [place] to Person,[place] to Time,[place] to Place,[place] to Situation  Attention:Normal: No  Attention: Unable to Concentrate  Thought Processes: Circumstantial  Thought Content:Normal: No  Thought Content: Preoccupations  Hallucinations: None  Delusions: No (pt denies)  Delusions: Obsessions  Memory:Normal: No  Memory: Poor Recent,Poor Remote  Insight and Judgment: No  Insight and Judgment: Poor Judgment,Poor Insight  Present Suicidal Ideation: No  Present Homicidal Ideation: No      Metabolic Screening:    Lab Results   Component Value Date    LABA1C 5.4 02/22/2022       Lab Results   Component Value Date    CHOL 156 02/22/2022    CHOL 111 06/08/2021     Lab Results   Component Value Date    TRIG 49 02/22/2022    TRIG 77 06/08/2021     Lab Results   Component Value Date    HDL 68 (H) 02/22/2022    HDL 35 (L) 06/08/2021     No components found for: [facility]REAT[facility]  Lab Results   Component Value Date    LABVLDL 10 02/22/2022    LABVLDL 15 06/08/2021       Bridge Appointment completed: Reviewed Discharge Instructions with patient. Patient verbalizes understanding and agreement with the discharge plan using the teachback method.      Referral for Outpatient Tobacco Cessation Counseling, upon discharge (diane X if applicable and completed):    ( )  Hospital staff assisted patient to call Quit Line or faxed referral                                   during hospitalization                  ( )  Recognizing danger situations (included triggers and roadblocks), if not completed on admission                    ( )  Coping skills (new ways to manage stress, exercise, relaxation techniques, changing routine, distraction), if not completed on admission                                                           ( )  Basic information about quitting (benefits of quitting, techniques in how to quit, available resources, if not completed on admission  ( ) Referral for counseling faxed to [facility]   (x ) Patient refused referral  (x ) Patient refused counseling  (x ) Patient refused smoking cessation medication upon discharge    Vaccinations (diane X if applicable and completed):  ( ) Patient states already received influenza vaccine elsewhere  ( ) Patient received influenza vaccine during this hospitalization  (x ) Patient refused influenza vaccine at this time

## 2022-02-24 NOTE — PLAN OF CARE
Will Shilpi has been quiet and isolative to room tonight. Did not attend PM group. Evasive with questioning. Denies SI/HI and denies hallucinations. Flat affect. Refused scheduled Haldol and stated \"It makes me sick. \" No behaviors. Will continue to monitor.

## 2022-02-24 NOTE — BH NOTE
Purposeful Rounding    Patient Location: Patient room    Patient willing to engage in conversation: Yes    Presentation/behavior: Guarded/Suspicious and Cooperative    Affect: Flat/blunted and Constricted    Concerns reported: Refused scheduled Haldol    PRN medications given: NOne    Environmental assessment: Room free from clutter, Clear path to bathroom  and Adequate lighting    Fall prevention interventions in place: Lighting appropriate, Room free of clutter and Clear path to bathroom    Daily Price Fall Risk Score: 57    Daily Lee Fall Risk Score: 0      Electronically signed by Tono Bernstein RN on 2/23/22 at 11:37 PM EST

## 2022-03-11 ENCOUNTER — NURSE TRIAGE (OUTPATIENT)
Dept: OTHER | Facility: CLINIC | Age: 22
End: 2022-03-11

## 2022-03-11 ENCOUNTER — OFFICE VISIT (OUTPATIENT)
Dept: FAMILY MEDICINE CLINIC | Age: 22
End: 2022-03-11
Payer: COMMERCIAL

## 2022-03-11 VITALS
WEIGHT: 223 LBS | TEMPERATURE: 97.7 F | DIASTOLIC BLOOD PRESSURE: 66 MMHG | BODY MASS INDEX: 35.84 KG/M2 | OXYGEN SATURATION: 99 % | RESPIRATION RATE: 14 BRPM | HEART RATE: 74 BPM | HEIGHT: 66 IN | SYSTOLIC BLOOD PRESSURE: 108 MMHG

## 2022-03-11 DIAGNOSIS — L03.114 CELLULITIS OF LEFT UPPER EXTREMITY: Primary | ICD-10-CM

## 2022-03-11 PROCEDURE — 99213 OFFICE O/P EST LOW 20 MIN: CPT | Performed by: NURSE PRACTITIONER

## 2022-03-11 RX ORDER — HALOPERIDOL 2 MG/1
5 TABLET ORAL 2 TIMES DAILY
COMMUNITY
End: 2022-05-07

## 2022-03-11 RX ORDER — CEPHALEXIN 500 MG/1
CAPSULE ORAL
COMMUNITY
Start: 2022-03-09 | End: 2022-03-11 | Stop reason: SINTOL

## 2022-03-11 RX ORDER — SULFAMETHOXAZOLE AND TRIMETHOPRIM 800; 160 MG/1; MG/1
1 TABLET ORAL 2 TIMES DAILY
Qty: 14 TABLET | Refills: 0 | Status: SHIPPED | OUTPATIENT
Start: 2022-03-11 | End: 2022-03-18

## 2022-03-11 ASSESSMENT — ENCOUNTER SYMPTOMS
RESPIRATORY NEGATIVE: 1
GASTROINTESTINAL NEGATIVE: 1
COLOR CHANGE: 1

## 2022-03-11 ASSESSMENT — PATIENT HEALTH QUESTIONNAIRE - PHQ9
SUM OF ALL RESPONSES TO PHQ QUESTIONS 1-9: 0
SUM OF ALL RESPONSES TO PHQ QUESTIONS 1-9: 0
1. LITTLE INTEREST OR PLEASURE IN DOING THINGS: 0
2. FEELING DOWN, DEPRESSED OR HOPELESS: 0
SUM OF ALL RESPONSES TO PHQ QUESTIONS 1-9: 0
SUM OF ALL RESPONSES TO PHQ9 QUESTIONS 1 & 2: 0
SUM OF ALL RESPONSES TO PHQ QUESTIONS 1-9: 0

## 2022-03-11 NOTE — PROGRESS NOTES
3/11/2022    This is a 25 y.o. adult   Chief Complaint   Patient presents with    Medication Problem     had reaction to antibiotics   . Patel Aracelis today for possible allergic reaction to Keflex as well as continued issues with cellulitis. He states he got his injection in his left deltoid and then almost immediately developed redness swelling and pain. He was seen at urgent care and given Keflex. 24 hours of starting Keflex he developed a hive type rash on his bilateral arms. He denies any shortness of breath. He stopped taking the Keflex and started Benadryl which improved the hives. He still however has the swelling and tenderness in the left deltoid. He has tried ice for this and that has not made any improvement. Patient Active Problem List   Diagnosis    Female-to-male transgender person    Psychotic disorder with delusions ([address]    Gender dysphoria in adult    Potential for intentional self-harm    Encounter for routine adult medical examination    Bipolar disorder, current episode manic severe with psychotic features ([address]    Delusions ([address]    Exposure to bat without known bite    Schizoaffective disorder, bipolar type ([address]    PTSD (post-traumatic stress disorder)    Borderline personality disorder ([address]       Current Outpatient Medications   Medication Sig Dispense Refill    haloperidol (HALDOL) 2 MG tablet Take 2 mg by mouth daily      paliperidone palmitate ER (INVEGA SUSTENNA) 156 MG/ML RALF IM injection Inject 156 mg into the muscle once for 1 dose 1 each 0    cephALEXin (KEFLEX) 500 MG capsule TAKE 1 CAPSULE BY MOUTH TWICE DAILY       No current facility-administered medications for this visit.        Allergies   Allergen Reactions    Remeron [Mirtazapine] Other (See Comments)     orlin    Abilify [Aripiprazole] Nausea And Vomiting       Ht 5' 6\" (1.676 m)   Wt 223 lb (101.2 kg)   BMI 35.99 kg/m²     Social History     Tobacco Use    Smoking status: Never Smoker    Smokeless tobacco: Never Used   Substance Use Topics    Alcohol use: Not Currently     Comment: rarely       Review of Systems   Constitutional: Negative for activity change, fatigue and fever. Respiratory: Negative. Cardiovascular: Negative. Gastrointestinal: Negative. Musculoskeletal: Positive for myalgias. Negative for arthralgias and joint swelling. Skin: Positive for color change. Negative for rash. Physical Exam  Vitals and nursing note reviewed. Constitutional:       General: He is not in acute distress. Appearance: He is well-developed. He is obese. He is not ill-appearing or diaphoretic. HENT:      Head: Normocephalic and atraumatic. Right Ear: External ear normal.      Left Ear: External ear normal.      Nose: Nose normal.      Mouth/Throat:      Pharynx: No oropharyngeal exudate. Eyes:      General:         Right eye: No discharge. Left eye: No discharge. Conjunctiva/sclera: Conjunctivae normal.   Cardiovascular:      Rate and Rhythm: Normal rate and regular rhythm. Heart sounds: Normal heart sounds. No murmur heard. No friction rub. No gallop. Pulmonary:      Effort: Pulmonary effort is normal. No respiratory distress. Breath sounds: Normal breath sounds. No wheezing or rales. Abdominal:      Palpations: Abdomen is soft. Musculoskeletal:         General: Tenderness (Right deltoid with area of induration measuring about the size of a lemon) present. Normal range of motion. Cervical back: Normal range of motion and neck supple. Lymphadenopathy:      Cervical: No cervical adenopathy. Skin:     General: Skin is warm and dry. Coloration: Skin is not pale. Findings: Erythema present. No rash. Neurological:      Mental Status: He is alert and oriented to person, place, and time. Motor: No abnormal muscle tone.       Coordination: Coordination normal.   Psychiatric:         Behavior: Behavior normal.         Thought Content: Thought content normal.         Judgment: Judgment normal.         Diagnosis       ICD-10-CM    1. Cellulitis of left upper extremity  L03.114         Plan    Stop Keflex  Added to allergy list  Start Bactrim  Cool compresses  If failing to improve by Monday let the office know    No orders of the defined types were placed in this encounter. Orders Placed This Encounter   Medications    sulfamethoxazole-trimethoprim (BACTRIM DS;SEPTRA DS) 800-160 MG per tablet     Sig: Take 1 tablet by mouth 2 times daily for 7 days     Dispense:  14 tablet     Refill:  0       Patient Education:  Plan    Return if symptoms worsen or fail to improve.

## 2022-03-11 NOTE — PATIENT INSTRUCTIONS
scrapes, or other injuries to your skin. Cellulitis most often occurs where there is a break in the skin. · If you get a scrape, cut, mild burn, or bite, wash the wound with clean water as soon as you can to help avoid infection. Don't use hydrogen peroxide or alcohol, which can slow healing. · If you have swelling in your legs (edema), support stockings and good skin care may help prevent leg sores and cellulitis. · Take care of your feet, especially if you have diabetes or other conditions that increase the risk of infection. Wear shoes and socks. Do not go barefoot. If you have athlete's foot or other skin problems on your feet, talk to your doctor about how to treat them. When should you call for help? Call your doctor now or seek immediate medical care if:    · You have signs that your infection is getting worse, such as:  ? Increased pain, swelling, warmth, or redness. ? Red streaks leading from the area. ? Pus draining from the area. ? A fever.     · You get a rash. Watch closely for changes in your health, and be sure to contact your doctor if:    · You do not get better as expected. Where can you learn more? Go to [email or web address] and sign in to your [employer] account. Enter O795 in the [facility] box to learn more about \"Cellulitis: Care Instructions. \"     If you do not have an account, please click on the \"Sign Up Now\" link. Current as of: March 3, 2021               Content Version: 13.1  © 2006-2021 Healthwise, [facility]. Care instructions adapted under license by [facility] ([facility]). If you have questions about a medical condition or this instruction, always ask your healthcare professional. [address] any warranty or liability for your use of this information.

## 2022-03-11 NOTE — TELEPHONE ENCOUNTER
Received call from [address] at [facility] with Red Flag Complaint. Subjective: Caller states \"Went to  yesterday, I got a shot and it was swollen and red\"     Current Symptoms: left Swelling in arm increased, rash from keflex    Onset: 2 days ago; sudden    Associated Symptoms: NA    Pain Severity: Pain only when touching arm    Temperature:  no fever reported    What has been tried: keflex - after benadryl     LMP: NA Pregnant: No    Recommended disposition: callback by PCP within 1 hour. Nikolas orantes spoke to provider and will schedule patient for office visit at 2pm, today to discuss options. Care advice provided, patient verbalizes understanding; denies any other questions or concerns; instructed to call back for any new or worsening symptoms. Patient/caller agrees to follow-up with PCP      Attention Provider: Thank you for allowing me to participate in the care of your patient. The patient was connected to triage in response to information provided to the ECC/PSC. Please do not respond through this encounter as the response is not directed to a shared pool.             Reason for Disposition   Rash beginning within 4 hours of a new prescription medication    Protocols used: RASH - WIDESPREAD ON DRUGS-ADULT-OH

## 2022-03-15 ENCOUNTER — HOSPITAL ENCOUNTER (EMERGENCY)
Age: 22
Discharge: HOME | End: 2022-03-15
Payer: COMMERCIAL

## 2022-03-15 VITALS
SYSTOLIC BLOOD PRESSURE: 130 MMHG | HEART RATE: 81 BPM | DIASTOLIC BLOOD PRESSURE: 74 MMHG | RESPIRATION RATE: 16 BRPM | HEIGHT: 68 IN | OXYGEN SATURATION: 97 % | BODY MASS INDEX: 31.83 KG/M2 | TEMPERATURE: 98.2 F | WEIGHT: 210 LBS

## 2022-03-15 DIAGNOSIS — R45.851 SUICIDAL IDEATION: Primary | ICD-10-CM

## 2022-03-15 DIAGNOSIS — R44.3 HALLUCINATION: ICD-10-CM

## 2022-03-15 LAB
A/G RATIO: 2.1 (ref 1.1–2.2)
ACETAMINOPHEN LEVEL: <5 UG/ML (ref 10–30)
ALBUMIN SERPL-MCNC: 4.7 G/DL (ref 3.4–5)
ALP BLD-CCNC: 73 U/L (ref 40–129)
ALT SERPL-CCNC: 12 U/L (ref 10–40)
AMPHETAMINE SCREEN, URINE: NORMAL
ANION GAP SERPL CALCULATED.3IONS-SCNC: 10 MMOL/L (ref 3–16)
AST SERPL-CCNC: 13 U/L (ref 15–37)
BARBITURATE SCREEN URINE: NORMAL
BASOPHILS ABSOLUTE: 0.1 K/UL (ref 0–0.2)
BASOPHILS RELATIVE PERCENT: 1 %
BENZODIAZEPINE SCREEN, URINE: NORMAL
BILIRUB SERPL-MCNC: <0.2 MG/DL (ref 0–1)
BILIRUBIN URINE: NEGATIVE
BLOOD, URINE: NEGATIVE
BUN BLDV-MCNC: 12 MG/DL (ref 7–20)
CALCIUM SERPL-MCNC: 9.3 MG/DL (ref 8.3–10.6)
CANNABINOID SCREEN URINE: NORMAL
CHLORIDE BLD-SCNC: 104 MMOL/L (ref 99–110)
CLARITY: CLEAR
CO2: 23 MMOL/L (ref 21–32)
COCAINE METABOLITE SCREEN URINE: NORMAL
COLOR: NORMAL
CREAT SERPL-MCNC: 0.6 MG/DL (ref 0.6–1.1)
EOSINOPHILS ABSOLUTE: 0.2 K/UL (ref 0–0.6)
EOSINOPHILS RELATIVE PERCENT: 3.1 %
ETHANOL: NORMAL MG/DL (ref 0–0.08)
GFR AFRICAN AMERICAN: >60
GFR NON-AFRICAN AMERICAN: >60
GLUCOSE BLD-MCNC: 141 MG/DL (ref 70–99)
GLUCOSE URINE: NEGATIVE MG/DL
HCG QUALITATIVE: NEGATIVE
HCT VFR BLD CALC: 36.8 % (ref 36–48)
HEMOGLOBIN: 12.4 G/DL (ref 12–16)
INFLUENZA A: NOT DETECTED
INFLUENZA B: NOT DETECTED
KETONES, URINE: NEGATIVE MG/DL
LEUKOCYTE ESTERASE, URINE: NEGATIVE
LYMPHOCYTES ABSOLUTE: 1.1 K/UL (ref 1–5.1)
LYMPHOCYTES RELATIVE PERCENT: 19.8 %
Lab: NORMAL
MCH RBC QN AUTO: 28.8 PG (ref 26–34)
MCHC RBC AUTO-ENTMCNC: 33.6 G/DL (ref 31–36)
MCV RBC AUTO: 85.8 FL (ref 80–100)
METHADONE SCREEN, URINE: NORMAL
MICROSCOPIC EXAMINATION: NORMAL
MONOCYTES ABSOLUTE: 0.4 K/UL (ref 0–1.3)
MONOCYTES RELATIVE PERCENT: 6.9 %
NEUTROPHILS ABSOLUTE: 3.9 K/UL (ref 1.7–7.7)
NEUTROPHILS RELATIVE PERCENT: 69.2 %
NITRITE, URINE: NEGATIVE
OPIATE SCREEN URINE: NORMAL
OXYCODONE URINE: NORMAL
PDW BLD-RTO: 13.6 % (ref 12.4–15.4)
PH UA: 6.5
PH UA: 6.5 (ref 5–8)
PHENCYCLIDINE SCREEN URINE: NORMAL
PLATELET # BLD: 328 K/UL (ref 135–450)
PMV BLD AUTO: 6.5 FL (ref 5–10.5)
POTASSIUM REFLEX MAGNESIUM: 4.2 MMOL/L (ref 3.5–5.1)
PROPOXYPHENE SCREEN: NORMAL
PROTEIN UA: NEGATIVE MG/DL
RBC # BLD: 4.29 M/UL (ref 4–5.2)
SALICYLATE, SERUM: <0.3 MG/DL (ref 15–30)
SARS-COV-2 RNA, RT PCR: NOT DETECTED
SODIUM BLD-SCNC: 137 MMOL/L (ref 136–145)
SPECIFIC GRAVITY UA: 1.02 (ref 1–1.03)
TOTAL PROTEIN: 6.9 G/DL (ref 6.4–8.2)
URINE REFLEX TO CULTURE: NORMAL
URINE TYPE: NORMAL
UROBILINOGEN, URINE: 0.2 E.U./DL
WBC # BLD: 5.6 K/UL (ref 4–11)

## 2022-03-15 PROCEDURE — 87636 SARSCOV2 & INF A&B AMP PRB: CPT

## 2022-03-15 PROCEDURE — 99283 EMERGENCY DEPT VISIT LOW MDM: CPT

## 2022-03-15 PROCEDURE — 80143 DRUG ASSAY ACETAMINOPHEN: CPT

## 2022-03-15 PROCEDURE — 80053 COMPREHEN METABOLIC PANEL: CPT

## 2022-03-15 PROCEDURE — 84703 CHORIONIC GONADOTROPIN ASSAY: CPT

## 2022-03-15 PROCEDURE — 82077 ASSAY SPEC XCP UR&BREATH IA: CPT

## 2022-03-15 PROCEDURE — 85025 COMPLETE CBC W/AUTO DIFF WBC: CPT

## 2022-03-15 PROCEDURE — 80307 DRUG TEST PRSMV CHEM ANLYZR: CPT

## 2022-03-15 PROCEDURE — 81003 URINALYSIS AUTO W/O SCOPE: CPT

## 2022-03-15 PROCEDURE — 80179 DRUG ASSAY SALICYLATE: CPT

## 2022-03-15 PROCEDURE — 36415 COLL VENOUS BLD VENIPUNCTURE: CPT

## 2022-03-15 ASSESSMENT — ENCOUNTER SYMPTOMS
SHORTNESS OF BREATH: 0
RHINORRHEA: 0
ABDOMINAL PAIN: 0
FACIAL SWELLING: 0
COLOR CHANGE: 0
SORE THROAT: 0

## 2022-03-15 NOTE — ED NOTES
Collateral Contact:  Name: Allie Napier  Phone: [phone]  Relation to Patient: [place]  Last Contact with Patient: Today  Concerns: Allie Napier states pt has actually been doing very well lately. She states the only thing that has been an issue is pt's lack of sleep. She states, \"That's actually my fault because I've been keeping him up talking at night when I get off work. \" Allie Napier states pt has made no recent suicidal statements or self-harm. Allie Napier states she feels, \"perfectly safe,\" with pt returning home. She states she will be getting off early tonight and will be with pt throughout the night. She denies all access to guns in the home. Allie Napier states if pt is discharged, she will help him follow up with outpt tx. She has no current concerns for his safety. Rustam Mcelroy, is supportive of a plan to discharge Nirmala Piedra.      [address] [place] [address]  03/15/22 1919

## 2022-03-15 NOTE — ED NOTES
Presenting Problem:Patient presents to ED/ABI on a SOB by both Psychiatric Provider Joao Underwood, PMHNP-BC & FNP-BC and the  who brought patient in. SOB by therapist states \" Betzy Portillo has been awake for >50 hours, although unsure, continues to see zahra and AH. Unsure if pt has been taking medications. Pt reports daily thoughts of SI with plan although denies intent. Works as pharmacy tech. See's visual hallucinations through out the day. Reports has not been honest with therapist. Karlo Shannon is outside home and zahra in the home. Pt reports suicide back up plan is also on option and \"ALWAYS in the background\". \"     Patient reports she was sent here however does not feels she needs to be here. Reports she is upset because she has been doing very well. Reports she feels the therapist twisted what she said and made her come here. Reports she was working today and went to a therapy appointment and had plans to go back to work. Reports she really needs to get back tonight to close. Reports she feels safe. Denies SI. Reports the therapist asked her about suicidal thoughts and she said at times in past she has thought about running her car off road and made comment \"Doesn't everyone\". Reports she is not suicidal at all. Patient denies any thoughts of wanting to harm others. Patient denies hallucinations or delusions. Reports as far as sleep reports she did stay up because her fiance Betzy Garcia was up all night so she stayed up with her because of different schedules. Reports when she told the therapist about the demon she was speaking about a shadow she seen a few days ago and referenced it as looking like a demon. Reports she does not even believe in demons. Patient reports she told her therapist she has been taking her medications and that she still has the Invega injection in her. Reports however she does not plan to continue taking the Invega.  Reports she obtained cellulitis from the injection then was started on a antibiotic which caused a allergic reaction. Reports she never told the therapist she was having daily suicidal thoughts. Reports they discussed her chronic suicidal thoughts. Reports the visual hallucinations she reported were the shadows. Report the \"Genie\" is called a DGinna that is a Syriac belief from the 1300's that is something Yarsanism that people really believe in and there are theories about. When asked about being honest with therapist she reports I told her I was honest. When asked about making comments about having a suicide back up plan always being a option she said she was speaking of her chronic suicidal thoughts and told her she was not currently suicidal and was doing very well. Patient known to [facility] staff. Patient has been here in past with more acute presentation. Patient admitted in past visits that  she was suicidal and reported she felt she needed admitted in past. Patient now reporting she is doing so well and it reported that it is very upsetting that she was even sent here being she was proud of doing well. Patient future oriented and reports how happy she and her Fiance are and how happy with her job she is and how much she is enjoying it. Patient denies any stressors. Reports she has been eating well and sleeping well other then the last couple days in which she stayed up on purpose. Appearance/Hygiene:  hospital attire, good grooming and good hygiene   Motor Behavior: wnl   Attitude: cooperative  Affect: normal affect   Speech: normal pitch and normal volume  Mood: within normal limits   Thought Processes: [place] and Logical  Perceptions: Absent - denies and does not appear to be responding to internal stimuli  Thought content: wnl    Orientation: A&Ox4   Memory: intact  Concentration: Good    Insight/ judgement: normal insight and judgment      Psychosocial and contextual factors: Patient lives with his fiance. Reports her fiance is very supportive.  Patient reports he works FT as a pharmacy Tech at [employer]. Reports he has no communication with his family. Reports he is doing very well and is very happy with his relationship and his job. C-SSRS flowsheet IS Complete. Psychiatric History (including current outpatient provider and past inpatient admissions): Patient has multiple admission here at [facility] and numerous other facilities. Patient last admission here was 2/2022. Patient diagnosed with Bipolar D/O with Janelle and Psychotic Features and Gender Dysphoria . [facility] and see[facility]chasity Rosa at [facility] for telehealth therapy appointments.      Access to Firearms: Denies    ASSESSMENT FOR IMMINENT FUTURE DANGER:    RISK FACTORS:    [x]  Age <25 or >49   []  Male gender   []  Depressed mood   []  Active suicidal ideation   []  Suicide plan   []  Suicide attempt   []  Access to lethal means   [x]  Prior suicide attempt   []  Active substance abuse ()   []  Highly impulsive behaviors   []  Not attending to self-care/ADLs    []  Recent significant loss   []  Chronic pain or medical illness   []  Social isolation   []  History of violence ()   []  Active psychosis   []  Cognitive impairment    []  No outpatient services in place   []  Medication noncompliance   []  No collateral information to support safety  [] Self- injurious/ Self-harm behavior    PROTECTIVE FACTORS:  [] Age >25 and <55  [x] Female gender   [] Denies depression  [x] Denies suicidal ideation  [x] Does not have lethal plan   [x] Does not have access to guns or weapons  [x] Patient is verbally joy for safety  [] No prior suicide attempts  [x] No active substance abuse  [x] Patient has social or family support  [x] No active psychosis or cognitive dysfunction  [x] Physically healthy  [x] Has outpatient services in place  [x] Compliant with recommended medications  [] Collateral information from supports patient safety   [x] Patient is future oriented with plans to return to work, get , and follow up with outpatient services.             Jacoby Ozuna, RN  03/15/22 [id number]

## 2022-03-15 NOTE — ED NOTES
Report given to [facility].       Braydon Argueta RN  03/15/22 1925

## 2022-03-15 NOTE — Clinical Note
Nirmala Piedra was seen and treated in our emergency department on 3/15/2022. He may return to work on 03/16/2022. If you have any questions or concerns, please don't hesitate to call.       Nicolás Nguyễn, DO

## 2022-03-15 NOTE — ED NOTES
Discharge instructions reviewed with patient, patient verbalized understanding. Safety plan completed and reviewed with patient. Patient ambulated to lobby with no assistance and steady gait. Patient discharged with all of her belongings.         Jean Bland RN  03/15/22 [id number]
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
## 2022-03-15 NOTE — ED NOTES
Level of Care Disposition:       Patient was seen by ED provider and [facility] staff. The case was presented to psychiatric provider on-call Dr. Gordon Curran. Based on the ED evaluation and information presented to the provider by this nurse, it is the recommendation of the on call psychiatric provider that the patient be discharged from a psychiatric standpoint with the following referrals: Back to outpatient providers Safety Plan completed by patient.  Leonides Melgar RN  03/15/22 1923

## 2022-03-15 NOTE — ED NOTES
Patient brought back from main ED. Patient changed into safety gown and belongings locked into locker. Patient oriented to [facility]. Will continue to monitor patient.       Leslye Hays RN  03/15/22 [id number]

## 2022-04-25 ENCOUNTER — HOSPITAL ENCOUNTER (INPATIENT)
Age: 22
LOS: 1 days | Discharge: HOME | DRG: 885 | End: 2022-04-26
Attending: STUDENT IN AN ORGANIZED HEALTH CARE EDUCATION/TRAINING PROGRAM | Admitting: PSYCHIATRY & NEUROLOGY
Payer: COMMERCIAL

## 2022-04-25 DIAGNOSIS — R45.851 SUICIDAL IDEATION: Primary | ICD-10-CM

## 2022-04-25 PROBLEM — F25.9 SCHIZOAFFECTIVE DISORDER (HCC): Status: ACTIVE | Noted: 2022-04-25

## 2022-04-25 LAB
A/G RATIO: 1.8 (ref 1.1–2.2)
ACETAMINOPHEN LEVEL: <5 UG/ML (ref 10–30)
ALBUMIN SERPL-MCNC: 4.6 G/DL (ref 3.4–5)
ALP BLD-CCNC: 67 U/L (ref 40–129)
ALT SERPL-CCNC: 14 U/L (ref 10–40)
AMPHETAMINE SCREEN, URINE: NORMAL
ANION GAP SERPL CALCULATED.3IONS-SCNC: 12 MMOL/L (ref 3–16)
AST SERPL-CCNC: 14 U/L (ref 15–37)
BARBITURATE SCREEN URINE: NORMAL
BASOPHILS ABSOLUTE: 0.1 K/UL (ref 0–0.2)
BASOPHILS RELATIVE PERCENT: 1 %
BENZODIAZEPINE SCREEN, URINE: NORMAL
BILIRUB SERPL-MCNC: 0.3 MG/DL (ref 0–1)
BUN BLDV-MCNC: 11 MG/DL (ref 7–20)
CALCIUM SERPL-MCNC: 9.6 MG/DL (ref 8.3–10.6)
CANNABINOID SCREEN URINE: NORMAL
CHLORIDE BLD-SCNC: 102 MMOL/L (ref 99–110)
CO2: 24 MMOL/L (ref 21–32)
COCAINE METABOLITE SCREEN URINE: NORMAL
CREAT SERPL-MCNC: 0.6 MG/DL (ref 0.6–1.1)
EOSINOPHILS ABSOLUTE: 0.3 K/UL (ref 0–0.6)
EOSINOPHILS RELATIVE PERCENT: 5.5 %
ETHANOL: NORMAL MG/DL (ref 0–0.08)
GFR AFRICAN AMERICAN: >60
GFR NON-AFRICAN AMERICAN: >60
GLUCOSE BLD-MCNC: 105 MG/DL (ref 70–99)
HCG QUALITATIVE: NEGATIVE
HCT VFR BLD CALC: 37.7 % (ref 36–48)
HEMOGLOBIN: 12.8 G/DL (ref 12–16)
INFLUENZA A: NOT DETECTED
INFLUENZA B: NOT DETECTED
LYMPHOCYTES ABSOLUTE: 1.4 K/UL (ref 1–5.1)
LYMPHOCYTES RELATIVE PERCENT: 28.1 %
Lab: NORMAL
MCH RBC QN AUTO: 29.3 PG (ref 26–34)
MCHC RBC AUTO-ENTMCNC: 34 G/DL (ref 31–36)
MCV RBC AUTO: 86.2 FL (ref 80–100)
METHADONE SCREEN, URINE: NORMAL
MONOCYTES ABSOLUTE: 0.4 K/UL (ref 0–1.3)
MONOCYTES RELATIVE PERCENT: 8.7 %
NEUTROPHILS ABSOLUTE: 2.9 K/UL (ref 1.7–7.7)
NEUTROPHILS RELATIVE PERCENT: 56.7 %
OPIATE SCREEN URINE: NORMAL
OXYCODONE URINE: NORMAL
PDW BLD-RTO: 13.1 % (ref 12.4–15.4)
PH UA: 6
PHENCYCLIDINE SCREEN URINE: NORMAL
PLATELET # BLD: 319 K/UL (ref 135–450)
PMV BLD AUTO: 6.2 FL (ref 5–10.5)
POTASSIUM REFLEX MAGNESIUM: 4.3 MMOL/L (ref 3.5–5.1)
PROPOXYPHENE SCREEN: NORMAL
RBC # BLD: 4.37 M/UL (ref 4–5.2)
SALICYLATE, SERUM: <0.3 MG/DL (ref 15–30)
SARS-COV-2 RNA, RT PCR: NOT DETECTED
SODIUM BLD-SCNC: 138 MMOL/L (ref 136–145)
TOTAL PROTEIN: 7.1 G/DL (ref 6.4–8.2)
WBC # BLD: 5 K/UL (ref 4–11)

## 2022-04-25 PROCEDURE — 84443 ASSAY THYROID STIM HORMONE: CPT

## 2022-04-25 PROCEDURE — 80061 LIPID PANEL: CPT

## 2022-04-25 PROCEDURE — 84703 CHORIONIC GONADOTROPIN ASSAY: CPT

## 2022-04-25 PROCEDURE — 36415 COLL VENOUS BLD VENIPUNCTURE: CPT

## 2022-04-25 PROCEDURE — 80179 DRUG ASSAY SALICYLATE: CPT

## 2022-04-25 PROCEDURE — 82077 ASSAY SPEC XCP UR&BREATH IA: CPT

## 2022-04-25 PROCEDURE — G0378 HOSPITAL OBSERVATION PER HR: HCPCS

## 2022-04-25 PROCEDURE — 80053 COMPREHEN METABOLIC PANEL: CPT

## 2022-04-25 PROCEDURE — 80307 DRUG TEST PRSMV CHEM ANLYZR: CPT

## 2022-04-25 PROCEDURE — 99285 EMERGENCY DEPT VISIT HI MDM: CPT

## 2022-04-25 PROCEDURE — 80143 DRUG ASSAY ACETAMINOPHEN: CPT

## 2022-04-25 PROCEDURE — 87636 SARSCOV2 & INF A&B AMP PRB: CPT

## 2022-04-25 PROCEDURE — 83036 HEMOGLOBIN GLYCOSYLATED A1C: CPT

## 2022-04-25 PROCEDURE — 85025 COMPLETE CBC W/AUTO DIFF WBC: CPT

## 2022-04-25 RX ORDER — MAGNESIUM HYDROXIDE/ALUMINUM HYDROXICE/SIMETHICONE 120; 1200; 1200 MG/30ML; MG/30ML; MG/30ML
30 SUSPENSION ORAL EVERY 6 HOURS PRN
Status: DISCONTINUED | OUTPATIENT
Start: 2022-04-25 | End: 2022-04-26 | Stop reason: HOSPADM

## 2022-04-25 RX ORDER — HYDROXYZINE 50 MG/1
50 TABLET, FILM COATED ORAL 3 TIMES DAILY PRN
Status: DISCONTINUED | OUTPATIENT
Start: 2022-04-25 | End: 2022-04-26 | Stop reason: HOSPADM

## 2022-04-25 RX ORDER — IBUPROFEN 400 MG/1
400 TABLET ORAL EVERY 6 HOURS PRN
Status: DISCONTINUED | OUTPATIENT
Start: 2022-04-25 | End: 2022-04-26 | Stop reason: HOSPADM

## 2022-04-25 RX ORDER — ACETAMINOPHEN 325 MG/1
650 TABLET ORAL EVERY 4 HOURS PRN
Status: DISCONTINUED | OUTPATIENT
Start: 2022-04-25 | End: 2022-04-26 | Stop reason: HOSPADM

## 2022-04-25 RX ORDER — TRAZODONE HYDROCHLORIDE 50 MG/1
50 TABLET ORAL NIGHTLY PRN
Status: DISCONTINUED | OUTPATIENT
Start: 2022-04-25 | End: 2022-04-26 | Stop reason: HOSPADM

## 2022-04-25 RX ORDER — OLANZAPINE 10 MG/1
10 TABLET ORAL EVERY 8 HOURS PRN
Status: DISCONTINUED | OUTPATIENT
Start: 2022-04-25 | End: 2022-04-26 | Stop reason: HOSPADM

## 2022-04-25 RX ORDER — DIPHENHYDRAMINE HYDROCHLORIDE 50 MG/ML
50 INJECTION INTRAMUSCULAR; INTRAVENOUS EVERY 4 HOURS PRN
Status: DISCONTINUED | OUTPATIENT
Start: 2022-04-25 | End: 2022-04-26 | Stop reason: HOSPADM

## 2022-04-25 RX ORDER — POLYETHYLENE GLYCOL 3350 17 G
2 POWDER IN PACKET (EA) ORAL
Status: DISCONTINUED | OUTPATIENT
Start: 2022-04-25 | End: 2022-04-26 | Stop reason: HOSPADM

## 2022-04-25 NOTE — ED NOTES
Presenting Problem:Patient presents to [facility] on a SOB. Per SOB mobile crisis team was requested regarding pt by pt's therapist. Upon assessment with [facility] Department present pt reported thoughts, plans and intention to die by suicide by overdosing on haldol. Per pt he planned  to take 240 tablets of haldol. Pt reported feeling as though they are living in a simulation and has identified a way to get out of the simulation by killing themselves. Pt reported a diagnosis of schizoaffective disorder. Pt reported that it does not matter if they die as this is a simulation. Pt reported a suicide attempt 1 month ago by overdosing. Pt reported they have been planning to die by suicide. Over the past 3 days due to \"talking to someone who was not there\" at work. Pt believes that there is a demon living in the woods that has started the simulation. Pt reported that they plan to use their own blood after cutting themselves to kill the demon. Pt reported they brought a knife and sword into the woods to find the demon. Pt reported that \"if they have the perfect day they will kill themselves in order to get out of the simulation. \" pt cannot identify plans for the future and was unable to engage in a definitive safety plan, prompting recommendations for further evaluation.  met with pt for evaluation. Pt is a female to male transgender. He believes that he does not need to be here. Pt reported that he is not suicidal at this time. Pt reported that he was suicidal at the time because he was looking for a solution to a problem. When asked what the problem was pt reported that it was too complicated to explain to me. Pt reported that I would think that he is crazy talking about this delusion that he believes in called Lisandra that lives in the woods. Pt reported that he does not want to be in the hospital and does not want to kill himself right now.  Pt reported that he will eventually kill himself but not today. Pt reported that it is no longer a perfect day to kill himself. Pt reported that he has a fiance Johnny. He reported that he is eating ok, but has been drinking a lot of monster drinks to stay up to have same schedule as paul. 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 Pt identifies his fiance and cats as reasons to live. Appearance/Hygiene:  hospital attire, laying in bed  Motor Behavior: decreased   Attitude: cooperative  Affect: inappropriate  Speech: normal pitch and normal volume  Mood: anxious  Thought Processes: Walker and goal directed  Perceptions: pt reported hearing whispering but cant tell what its saying. Pt reported that this is baseline for him. Pt denies hallucinations. Pt does not appear to be RTIS. Thought content:  delusion of believing in a DGinna that lives in the woods  Orientation: A&Ox4   Memory: intact  Concentration: Good    Insight/ judgement: pt has a fixated delusion believing in [address] and contextual factors: pt reported that he lives with his paul Johnny. Pt reported that he is a pharmacy tech at [address] [place] [address] flowsheet is Complete. Psychiatric History (including current outpatient provider and past inpatient admissions): pt has had multiple hospitalizations at [facility] and numerous other facilities. Last admission on [facility] was 2/2022. Pt diagnosed with bipolar disorder with orlin and psychotic features and gender dysphoria. Pt sees Lisa De Los Santos at [email or web address] for telehealth therapy appts. Access to Firearms: denies    ASSESSMENT FOR IMMINENT FUTURE DANGER:    RISK FACTORS:    [x]  Age <25 or >49   []  Male gender   []  Depressed mood   []  Active suicidal ideation    [x]  Suicide plan - pt expressed to outpt therapist plan to overdose on haldol.  Pt denies current SI.    []  Suicide attempt   []  Access to lethal means   [x]  Prior suicide attempt   []  Active substance abuse    []  Highly impulsive behaviors []  Not attending to self-care/ADLs    []  Recent significant loss   []  Chronic pain or medical illness   []  Social isolation   []  History of violence    [x]  Active psychosis - fixated delusion believing in [place]   []  Cognitive impairment    []  No outpatient services in place   []  Medication noncompliance   []  No collateral information to support safety  [] Self- injurious/ Self-harm behavior    PROTECTIVE FACTORS:  [] Age >25 and <55  [x] Female gender  - female to male transgender  [] Denies depression  [x] Denies suicidal ideation- pt denies current SI  [] Does not have lethal plan   [x] Does not have access to guns or weapons - per SOB pt brought knife and sword into the woods  [x] Patient is verbally joy for safety  [] No prior suicide attempts  [x] No active substance abuse  [x] Patient has social or family support  [] No active psychosis or cognitive dysfunction  [x] Physically healthy  [] Has outpatient services in place  [] Compliant with recommended medications  [] Collateral information from  supports patient safety   [] Patient is future oriented with plans to           [facility], [address]  04/25/22 9452

## 2022-04-25 NOTE — ED NOTES
Spoke with Dr. Jie Jon. Telephone order to admit patient under observation.       Smooth Jolley RN  04/25/22 [id number]

## 2022-04-25 NOTE — ED NOTES
Report given to [facility].       Brandee Yu RN  04/25/22 1923

## 2022-04-25 NOTE — ED NOTES
Patient medication list confirmed by [facility].       Theodore Virk, RN  04/25/22 6736

## 2022-04-25 NOTE — ED NOTES
Level of Care Disposition: admit as observation      Patient was seen by ED provider and [facility] staff. The case presented to psychiatric provider on-call Dr. Mark Belcher. Based on the ED evaluation and information presented to the provider by [facility]  it is the recommendation of the on call psychiatric provider that pt be admitted as observation.        Insurance Pre certification Authorization: RACHEL Betancourt, [facility]  04/25/22 Bran Lorenzana, [facility]  04/25/22 172
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
## 2022-04-25 NOTE — ED NOTES
Patient brought back from main ER. Patient in safety clothing. Patient oriented to [facility] AN AFFILIATE OF [facility]. Will continue to monitor patient.       Benito Victor RN  04/25/22 [id number]

## 2022-04-26 VITALS
RESPIRATION RATE: 16 BRPM | SYSTOLIC BLOOD PRESSURE: 122 MMHG | HEIGHT: 67 IN | DIASTOLIC BLOOD PRESSURE: 62 MMHG | HEART RATE: 75 BPM | OXYGEN SATURATION: 97 % | BODY MASS INDEX: 31.39 KG/M2 | TEMPERATURE: 98.2 F | WEIGHT: 200 LBS

## 2022-04-26 PROBLEM — F31.2 BIPOLAR DISORDER, CURRENT EPISODE MANIC SEVERE WITH PSYCHOTIC FEATURES (HCC): Status: RESOLVED | Noted: 2022-01-12 | Resolved: 2022-04-26

## 2022-04-26 PROBLEM — R45.89 POTENTIAL FOR INTENTIONAL SELF-HARM: Status: RESOLVED | Noted: 2022-01-12 | Resolved: 2022-04-26

## 2022-04-26 PROBLEM — Z91.89 POTENTIAL FOR INTENTIONAL SELF-HARM: Status: RESOLVED | Noted: 2022-01-12 | Resolved: 2022-04-26

## 2022-04-26 LAB
CHOLESTEROL, TOTAL: 140 MG/DL (ref 0–199)
HDLC SERPL-MCNC: 46 MG/DL (ref 40–60)
LDL CHOLESTEROL CALCULATED: 73 MG/DL
TRIGL SERPL-MCNC: 106 MG/DL (ref 0–150)
TSH SERPL DL<=0.05 MIU/L-ACNC: 1.41 UIU/ML (ref 0.43–4)
VLDLC SERPL CALC-MCNC: 21 MG/DL

## 2022-04-26 PROCEDURE — 99234 HOSP IP/OBS SM DT SF/LOW 45: CPT | Performed by: PSYCHIATRY & NEUROLOGY

## 2022-04-26 PROCEDURE — 1240000000 HC EMOTIONAL WELLNESS R&B

## 2022-04-26 PROCEDURE — 5130000000 HC BRIDGE APPOINTMENT

## 2022-04-26 PROCEDURE — G0378 HOSPITAL OBSERVATION PER HR: HCPCS

## 2022-04-26 PROCEDURE — 6370000000 HC RX 637 (ALT 250 FOR IP): Performed by: PSYCHIATRY & NEUROLOGY

## 2022-04-26 RX ORDER — PALIPERIDONE 6 MG/1
12 TABLET, EXTENDED RELEASE ORAL EVERY MORNING
Status: ON HOLD | COMMUNITY
End: 2022-08-15 | Stop reason: SDUPTHER

## 2022-04-26 RX ORDER — HYDROXYZINE 50 MG/1
50 TABLET, FILM COATED ORAL 2 TIMES DAILY PRN
Status: ON HOLD | COMMUNITY
End: 2022-08-15 | Stop reason: SDUPTHER

## 2022-04-26 RX ORDER — PALIPERIDONE 3 MG/1
12 TABLET, EXTENDED RELEASE ORAL EVERY MORNING
Status: DISCONTINUED | OUTPATIENT
Start: 2022-04-26 | End: 2022-04-26 | Stop reason: HOSPADM

## 2022-04-26 RX ORDER — HALOPERIDOL 5 MG
5 TABLET ORAL 2 TIMES DAILY
Status: DISCONTINUED | OUTPATIENT
Start: 2022-04-26 | End: 2022-04-26 | Stop reason: HOSPADM

## 2022-04-26 RX ADMIN — PALIPERIDONE 12 MG: 3 TABLET, EXTENDED RELEASE ORAL at 10:58

## 2022-04-26 RX ADMIN — HALOPERIDOL 5 MG: 5 TABLET ORAL at 11:07

## 2022-04-26 ASSESSMENT — SLEEP AND FATIGUE QUESTIONNAIRES
SLEEP PATTERN: DIFFICULTY FALLING ASLEEP;RESTLESSNESS
AVERAGE NUMBER OF SLEEP HOURS: 5
DO YOU HAVE DIFFICULTY SLEEPING: YES
DO YOU USE A SLEEP AID: NO
SLEEP PATTERN: DIFFICULTY FALLING ASLEEP;RESTLESSNESS
DO YOU HAVE DIFFICULTY SLEEPING: YES
DO YOU USE A SLEEP AID: NO
AVERAGE NUMBER OF SLEEP HOURS: 5

## 2022-04-26 ASSESSMENT — LIFESTYLE VARIABLES
HOW MANY STANDARD DRINKS CONTAINING ALCOHOL DO YOU HAVE ON A TYPICAL DAY: PATIENT DECLINED
HOW OFTEN DO YOU HAVE A DRINK CONTAINING ALCOHOL: NEVER

## 2022-04-26 NOTE — BH NOTE
Patient arrived on the milieu via a wheelcair with ABI, RN and security @ 11:30. Patient alert and oriented x 3. Patient denies being suicidal and able to contract for safety. Patient signed admission paperwork but refused to sign Voluntary admission form.

## 2022-04-26 NOTE — BH NOTE
Purposeful Rounding     Patient Location: Patient room     Patient willing to engage in conversation:  Yes     Presentation/behavior: Cooperative     Affect: Flat/blunted     Concerns reported: None     PRN medications given: N/A     Environmental assessment: No safety hazards noted     Fall prevention interventions in place: Lighting appropriate, Room free of clutter and Clear path to bathroom     Daily Lee Fall Risk Score: 15

## 2022-04-26 NOTE — PLAN OF CARE
Problem: Self Harm/Suicidality  Goal: Will have no self-injury during hospital stay  Description: INTERVENTIONS:  1. Q 30 MINUTES: Routine safety checks  2. Q SHIFT & PRN: Assess risk to determine if routine checks are adequate to maintain patient safety  Outcome: Progressing     Problem: Anxiety  Goal: Will report anxiety at manageable levels  Description: INTERVENTIONS:  1. Administer medication as ordered  2. Teach and rehearse alternative coping skills  3. Provide emotional support with 1:1 interaction with staff  Outcome: Progressing     Problem: Coping  Goal: Pt/Family able to verbalize concerns and demonstrate effective coping strategies  Description: INTERVENTIONS:  1. Assist patient/family to identify coping skills, available support systems and cultural and spiritual values  2. Provide emotional support, including active listening and acknowledgement of concerns of patient and caregivers  3. Reduce environmental stimuli, as able  4. Instruct patient/family in relaxation techniques, as appropriate  5. Assess for spiritual pain/suffering and initiate Spiritual Care, Psychosocial Clinical Specialist consults as needed  Outcome: Progressing    Patient is interactive with staff. Patient denies SI/HI/AVH. Patient states they slept good last night. Patient verbalizes they will notify staff if suicidal thoughts worsen or is unable to CFS. He reports feeling \"normal.\" Patient compliant with medications. Patient is calm and cooperative.

## 2022-04-26 NOTE — PROGRESS NOTES
Behavioral Services  Medicare Certification Upon Admission    I certify that this patient's inpatient psychiatric hospital admission is medically necessary for:    [x] (1) Treatment which could reasonably be expected to improve this patient's condition,       [x] (2) Or for diagnostic study;     AND     [x](2) The inpatient psychiatric services are provided while the individual is under the care of a physician and are included in the individualized plan of care. Estimated length of stay/service 1 day.   Patient will be discharged today    Plan for post-hospital care [facility]    Electronically signed by Teresa Parker MD on 4/26/2022 at 11:17 AM
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
## 2022-04-26 NOTE — BH NOTE
[facility]  Discharge Note    Pt discharged with followings belongings:   Dental Appliances: None  Vision - Corrective Lenses: Eyeglasses  Hearing Aid: None  Jewelry: None  Body Piercings Removed: N/A  Clothing: Footwear,Shirt,Pants  Other Valuables: Other (Comment) (Patient denies)   Shae Trevino returned to patient. Patient education on aftercare instructions: Yes  Information faxed to [facility] and [address] [place] [address] by Naheed Bronson RN at 2:25 PM .Patient verbalize understanding of AVS:  Yes. Status EXAM upon discharge:  Mental Status and Behavioral Exam  Normal: Yes  Level of Assistance: Independent/Self  Facial Expression: Flat  Affect: Appropriate  Level of Consciousness: Alert  Frequency of Checks: 4 times per hour, close  Mood:Normal: No  Mood: Other (comment) (Patient states, \"Normal.\")  Motor Activity:Normal: Yes  Eye Contact: Fair  Observed Behavior: Cooperative  Sexual Misconduct History: Current - no  Preception: Max to person,Max to time,Max to place,Max to situation  Attention:Normal: Yes  Thought Processes: Circumstantial  Thought Content:Normal: Yes  Depression Symptoms: No problems reported or observed. Anxiety Symptoms: No problems reported or observed. Janelle Symptoms: No problems reported or observed.   Hallucination Type:  (Patient denies)  Hallucinations: None  Delusions: No (None stated during interview)  Memory:Normal: Yes  Insight and Judgment: No  Insight and Judgment: Poor judgment,Poor insight      Metabolic Screening:    Lab Results   Component Value Date    LABA1C 5.4 02/22/2022       Lab Results   Component Value Date    CHOL 156 02/22/2022    CHOL 111 06/08/2021     Lab Results   Component Value Date    TRIG 49 02/22/2022    TRIG 77 06/08/2021     Lab Results   Component Value Date    HDL 68 (H) 02/22/2022    HDL 35 (L) 06/08/2021     No components found for: [facility] [place]  Lab Results   Component Value Date    LABVLDL 10 02/22/2022 LABVLDL 15 06/08/2021       Nelli Patiño RN     Bridge Appointment completed: Reviewed Discharge Instructions with patient. Patient verbalizes understanding and agreement with the discharge plan using the teachback method.      Referral for Outpatient Tobacco Cessation Counseling, upon discharge (diane X if applicable and completed):    ( )  Hospital staff assisted patient to call Quit Line or faxed referral                                   during hospitalization                  ( )  Recognizing danger situations (included triggers and roadblocks), if not completed on admission                    ( )  Coping skills (new ways to manage stress, exercise, relaxation techniques, changing routine, distraction), if not completed on admission                                                           ( )  Basic information about quitting (benefits of quitting, techniques in how to quit, available resources, if not completed on admission  ( ) Referral for counseling faxed to [facility]   ( ) Patient refused referral  ( x) Patient refused counseling - not a smoker  ( ) Patient refused smoking cessation medication upon discharge    Vaccinations (diane X if applicable and completed):  ( ) Patient states already received influenza vaccine elsewhere  ( ) Patient received influenza vaccine during this hospitalization  (x ) Patient refused influenza vaccine at this time - hospital not offering flu shots currently

## 2022-04-26 NOTE — ED NOTES
Patient transferred to admitted unit [facility]. Patient currently in safety gown, and belongings and paperwork transferred to unit with patient. Patient escorted to [facility] by this RN and . Patient updated on plan of care and admit, patient accepting of plan to admit.         Nadeen Vega RN  04/25/22 1768

## 2022-04-26 NOTE — BH NOTE
Notified Dr. Ajay Stratton regarding order to discontinue the continuous suicide precautions. Patient denies being suicidal and able to contract for safety.
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
## 2022-04-26 NOTE — BH NOTE
Dr. Forde Check aware of medication verified with pharmacy, and no current prescription for hydroxyzine.

## 2022-04-26 NOTE — ED NOTES
Patient resting in room, no requests or complaints at this time.       Concetta Nyhan, RN  04/25/22 1390

## 2022-04-26 NOTE — ED NOTES
Patient resting in room with lights on, no requests or complaints at this time.       Scott Lundberg, VIC  04/25/22 9792

## 2022-04-26 NOTE — ED NOTES
Patient is lying in bed resting with lights on. No complaints or requests at this time.       Mary Ellen Arreola RN  04/25/22 9329

## 2022-04-27 LAB
ESTIMATED AVERAGE GLUCOSE: 108.3 MG/DL
HBA1C MFR BLD: 5.4 %

## 2022-05-07 ENCOUNTER — HOSPITAL ENCOUNTER (EMERGENCY)
Age: 22
Discharge: HOME | End: 2022-05-07
Attending: EMERGENCY MEDICINE
Payer: COMMERCIAL

## 2022-05-07 VITALS
HEART RATE: 87 BPM | SYSTOLIC BLOOD PRESSURE: 139 MMHG | TEMPERATURE: 98.2 F | DIASTOLIC BLOOD PRESSURE: 82 MMHG | WEIGHT: 200 LBS | HEIGHT: 67 IN | RESPIRATION RATE: 18 BRPM | BODY MASS INDEX: 31.39 KG/M2 | OXYGEN SATURATION: 99 %

## 2022-05-07 DIAGNOSIS — S81.812A LACERATION OF LEFT LOWER EXTREMITY, INITIAL ENCOUNTER: ICD-10-CM

## 2022-05-07 DIAGNOSIS — Z72.89 SELF-MUTILATION: Primary | ICD-10-CM

## 2022-05-07 PROCEDURE — 99283 EMERGENCY DEPT VISIT LOW MDM: CPT

## 2022-05-07 ASSESSMENT — ENCOUNTER SYMPTOMS
ABDOMINAL PAIN: 0
RHINORRHEA: 0
SHORTNESS OF BREATH: 0
SORE THROAT: 0
FACIAL SWELLING: 0
COLOR CHANGE: 0

## 2022-05-07 ASSESSMENT — PAIN - FUNCTIONAL ASSESSMENT: PAIN_FUNCTIONAL_ASSESSMENT: NONE - DENIES PAIN
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
## 2022-08-12 ENCOUNTER — HOSPITAL ENCOUNTER (INPATIENT)
Age: 22
LOS: 2 days | Discharge: HOME | DRG: 885 | End: 2022-08-15
Attending: EMERGENCY MEDICINE | Admitting: PSYCHIATRY & NEUROLOGY
Payer: COMMERCIAL

## 2022-08-12 DIAGNOSIS — F22 DELUSION (HCC): ICD-10-CM

## 2022-08-12 DIAGNOSIS — Z00.8 ENCOUNTER FOR PSYCHOLOGICAL EVALUATION: ICD-10-CM

## 2022-08-12 DIAGNOSIS — R45.851 SUICIDAL THOUGHTS: Primary | ICD-10-CM

## 2022-08-12 DIAGNOSIS — R44.3 HALLUCINATIONS: ICD-10-CM

## 2022-08-12 PROCEDURE — 99285 EMERGENCY DEPT VISIT HI MDM: CPT

## 2022-08-13 PROBLEM — F29 PSYCHOSIS, UNSPECIFIED PSYCHOSIS TYPE (HCC): Status: ACTIVE | Noted: 2022-08-13

## 2022-08-13 LAB
A/G RATIO: 2.2 (ref 1.1–2.2)
ACETAMINOPHEN LEVEL: <5 UG/ML (ref 10–30)
ALBUMIN SERPL-MCNC: 4.9 G/DL (ref 3.4–5)
ALP BLD-CCNC: 94 U/L (ref 40–129)
ALT SERPL-CCNC: 15 U/L (ref 10–40)
AMPHETAMINE SCREEN, URINE: NORMAL
ANION GAP SERPL CALCULATED.3IONS-SCNC: 12 MMOL/L (ref 3–16)
AST SERPL-CCNC: 16 U/L (ref 15–37)
BARBITURATE SCREEN URINE: NORMAL
BASOPHILS ABSOLUTE: 0.1 K/UL (ref 0–0.2)
BASOPHILS RELATIVE PERCENT: 1.1 %
BENZODIAZEPINE SCREEN, URINE: NORMAL
BILIRUB SERPL-MCNC: <0.2 MG/DL (ref 0–1)
BUN BLDV-MCNC: 16 MG/DL (ref 7–20)
CALCIUM SERPL-MCNC: 9.8 MG/DL (ref 8.3–10.6)
CANNABINOID SCREEN URINE: NORMAL
CHLORIDE BLD-SCNC: 102 MMOL/L (ref 99–110)
CO2: 23 MMOL/L (ref 21–32)
COCAINE METABOLITE SCREEN URINE: NORMAL
CREAT SERPL-MCNC: 0.7 MG/DL (ref 0.6–1.1)
EOSINOPHILS ABSOLUTE: 0.1 K/UL (ref 0–0.6)
EOSINOPHILS RELATIVE PERCENT: 2.2 %
ETHANOL: NORMAL MG/DL (ref 0–0.08)
GFR AFRICAN AMERICAN: >60
GFR NON-AFRICAN AMERICAN: >60
GLUCOSE BLD-MCNC: 148 MG/DL (ref 70–99)
HCG(URINE) PREGNANCY TEST: NEGATIVE
HCT VFR BLD CALC: 38.8 % (ref 36–48)
HEMOGLOBIN: 13.1 G/DL (ref 12–16)
LYMPHOCYTES ABSOLUTE: 1.4 K/UL (ref 1–5.1)
LYMPHOCYTES RELATIVE PERCENT: 21.6 %
Lab: NORMAL
MCH RBC QN AUTO: 29.2 PG (ref 26–34)
MCHC RBC AUTO-ENTMCNC: 33.6 G/DL (ref 31–36)
MCV RBC AUTO: 86.9 FL (ref 80–100)
METHADONE SCREEN, URINE: NORMAL
MONOCYTES ABSOLUTE: 0.5 K/UL (ref 0–1.3)
MONOCYTES RELATIVE PERCENT: 7.7 %
NEUTROPHILS ABSOLUTE: 4.4 K/UL (ref 1.7–7.7)
NEUTROPHILS RELATIVE PERCENT: 67.4 %
OPIATE SCREEN URINE: NORMAL
OXYCODONE URINE: NORMAL
PDW BLD-RTO: 13.3 % (ref 12.4–15.4)
PH UA: 6.5
PHENCYCLIDINE SCREEN URINE: NORMAL
PLATELET # BLD: 342 K/UL (ref 135–450)
PMV BLD AUTO: 6.5 FL (ref 5–10.5)
POTASSIUM REFLEX MAGNESIUM: 4.3 MMOL/L (ref 3.5–5.1)
PROPOXYPHENE SCREEN: NORMAL
RBC # BLD: 4.46 M/UL (ref 4–5.2)
SALICYLATE, SERUM: <0.3 MG/DL (ref 15–30)
SARS-COV-2, NAAT: NOT DETECTED
SODIUM BLD-SCNC: 137 MMOL/L (ref 136–145)
TOTAL PROTEIN: 7.1 G/DL (ref 6.4–8.2)
TSH SERPL DL<=0.05 MIU/L-ACNC: 1.52 UIU/ML (ref 0.43–4)
WBC # BLD: 6.5 K/UL (ref 4–11)

## 2022-08-13 PROCEDURE — 80179 DRUG ASSAY SALICYLATE: CPT

## 2022-08-13 PROCEDURE — 85025 COMPLETE CBC W/AUTO DIFF WBC: CPT

## 2022-08-13 PROCEDURE — 80143 DRUG ASSAY ACETAMINOPHEN: CPT

## 2022-08-13 PROCEDURE — 36415 COLL VENOUS BLD VENIPUNCTURE: CPT

## 2022-08-13 PROCEDURE — 80307 DRUG TEST PRSMV CHEM ANLYZR: CPT

## 2022-08-13 PROCEDURE — 80061 LIPID PANEL: CPT

## 2022-08-13 PROCEDURE — 84703 CHORIONIC GONADOTROPIN ASSAY: CPT

## 2022-08-13 PROCEDURE — 1240000000 HC EMOTIONAL WELLNESS R&B

## 2022-08-13 PROCEDURE — 99221 1ST HOSP IP/OBS SF/LOW 40: CPT

## 2022-08-13 PROCEDURE — 80053 COMPREHEN METABOLIC PANEL: CPT

## 2022-08-13 PROCEDURE — 82077 ASSAY SPEC XCP UR&BREATH IA: CPT

## 2022-08-13 PROCEDURE — 99223 1ST HOSP IP/OBS HIGH 75: CPT | Performed by: PSYCHIATRY & NEUROLOGY

## 2022-08-13 PROCEDURE — 84443 ASSAY THYROID STIM HORMONE: CPT

## 2022-08-13 PROCEDURE — 87635 SARS-COV-2 COVID-19 AMP PRB: CPT

## 2022-08-13 PROCEDURE — 6370000000 HC RX 637 (ALT 250 FOR IP): Performed by: PSYCHIATRY & NEUROLOGY

## 2022-08-13 PROCEDURE — 83036 HEMOGLOBIN GLYCOSYLATED A1C: CPT

## 2022-08-13 RX ORDER — MAGNESIUM HYDROXIDE/ALUMINUM HYDROXICE/SIMETHICONE 120; 1200; 1200 MG/30ML; MG/30ML; MG/30ML
30 SUSPENSION ORAL EVERY 6 HOURS PRN
Status: DISCONTINUED | OUTPATIENT
Start: 2022-08-13 | End: 2022-08-15 | Stop reason: HOSPADM

## 2022-08-13 RX ORDER — QUETIAPINE FUMARATE 200 MG/1
200 TABLET, FILM COATED ORAL NIGHTLY
Status: ON HOLD | COMMUNITY
Start: 2022-08-07 | End: 2022-08-15 | Stop reason: SDUPTHER

## 2022-08-13 RX ORDER — OLANZAPINE 5 MG/1
5 TABLET ORAL EVERY 4 HOURS PRN
Status: DISCONTINUED | OUTPATIENT
Start: 2022-08-13 | End: 2022-08-15 | Stop reason: HOSPADM

## 2022-08-13 RX ORDER — IBUPROFEN 400 MG/1
400 TABLET ORAL EVERY 6 HOURS PRN
Status: DISCONTINUED | OUTPATIENT
Start: 2022-08-13 | End: 2022-08-15 | Stop reason: HOSPADM

## 2022-08-13 RX ORDER — POLYETHYLENE GLYCOL 3350 17 G
2 POWDER IN PACKET (EA) ORAL
Status: DISCONTINUED | OUTPATIENT
Start: 2022-08-13 | End: 2022-08-15 | Stop reason: HOSPADM

## 2022-08-13 RX ORDER — HALOPERIDOL 5 MG
1 TABLET ORAL 2 TIMES DAILY
Status: ON HOLD | COMMUNITY
Start: 2022-06-06 | End: 2022-08-15 | Stop reason: HOSPADM

## 2022-08-13 RX ORDER — DIPHENHYDRAMINE HYDROCHLORIDE 50 MG/ML
50 INJECTION INTRAMUSCULAR; INTRAVENOUS EVERY 4 HOURS PRN
Status: DISCONTINUED | OUTPATIENT
Start: 2022-08-13 | End: 2022-08-15 | Stop reason: HOSPADM

## 2022-08-13 RX ORDER — OXCARBAZEPINE 150 MG/1
150 TABLET, FILM COATED ORAL 2 TIMES DAILY
Status: DISCONTINUED | OUTPATIENT
Start: 2022-08-13 | End: 2022-08-15

## 2022-08-13 RX ORDER — LANOLIN ALCOHOL/MO/W.PET/CERES
3 CREAM (GRAM) TOPICAL NIGHTLY PRN
Status: DISCONTINUED | OUTPATIENT
Start: 2022-08-13 | End: 2022-08-15 | Stop reason: HOSPADM

## 2022-08-13 RX ORDER — ACETAMINOPHEN 325 MG/1
650 TABLET ORAL EVERY 4 HOURS PRN
Status: DISCONTINUED | OUTPATIENT
Start: 2022-08-13 | End: 2022-08-15 | Stop reason: HOSPADM

## 2022-08-13 RX ORDER — HYDROXYZINE 50 MG/1
50 TABLET, FILM COATED ORAL 3 TIMES DAILY PRN
Status: DISCONTINUED | OUTPATIENT
Start: 2022-08-13 | End: 2022-08-15 | Stop reason: HOSPADM

## 2022-08-13 RX ORDER — QUETIAPINE FUMARATE 100 MG/1
200 TABLET, FILM COATED ORAL NIGHTLY
Status: CANCELLED | OUTPATIENT
Start: 2022-08-13

## 2022-08-13 RX ORDER — PALIPERIDONE 3 MG/1
12 TABLET, EXTENDED RELEASE ORAL EVERY MORNING
Status: DISCONTINUED | OUTPATIENT
Start: 2022-08-13 | End: 2022-08-15 | Stop reason: HOSPADM

## 2022-08-13 RX ADMIN — OXCARBAZEPINE 150 MG: 150 TABLET, FILM COATED ORAL at 21:07

## 2022-08-13 RX ADMIN — OXCARBAZEPINE 150 MG: 150 TABLET, FILM COATED ORAL at 11:52

## 2022-08-13 RX ADMIN — OLANZAPINE 5 MG: 5 TABLET, FILM COATED ORAL at 14:52

## 2022-08-13 RX ADMIN — PALIPERIDONE 12 MG: 3 TABLET, EXTENDED RELEASE ORAL at 11:52

## 2022-08-13 ASSESSMENT — LIFESTYLE VARIABLES
HOW MANY STANDARD DRINKS CONTAINING ALCOHOL DO YOU HAVE ON A TYPICAL DAY: PATIENT DOES NOT DRINK
HOW OFTEN DO YOU HAVE A DRINK CONTAINING ALCOHOL: NEVER
HOW MANY STANDARD DRINKS CONTAINING ALCOHOL DO YOU HAVE ON A TYPICAL DAY: PATIENT DOES NOT DRINK
HOW OFTEN DO YOU HAVE A DRINK CONTAINING ALCOHOL: NEVER

## 2022-08-13 ASSESSMENT — SLEEP AND FATIGUE QUESTIONNAIRES
DO YOU HAVE DIFFICULTY SLEEPING: NO
AVERAGE NUMBER OF SLEEP HOURS: 7
DO YOU USE A SLEEP AID: NO
DO YOU USE A SLEEP AID: NO
AVERAGE NUMBER OF SLEEP HOURS: 9
DO YOU HAVE DIFFICULTY SLEEPING: NO

## 2022-08-13 ASSESSMENT — PAIN SCALES - GENERAL: PAINLEVEL_OUTOF10: 0

## 2022-08-13 NOTE — PROGRESS NOTES
Behavioral Services  Medicare Certification Upon Admission    I certify that this patient's inpatient psychiatric hospital admission is medically necessary for:    [x] (1) Treatment which could reasonably be expected to improve this patient's condition,       [x] (2) Or for diagnostic study;     AND     [x](2) The inpatient psychiatric services are provided while the individual is under the care of a physician and are included in the individualized plan of care.     Estimated length of stay/service 3 d    [facility]    Electronically signed by Tamiko Reese MD on 8/13/2022 at 11:18 AM

## 2022-08-13 NOTE — ED NOTES
Presenting Problem:Patient presents to [facility] ED on a SOB after the patient called the crisis line ([id number]) with suicidal ideation with intent/plan to OD on her prescription medications. The patient while on the crisis line would not provide location details, and reported she called so that she would not die alone--crisis line notified the police and the police found her location in the [facility] parking lot with her prescription medications--reports she left home to commit suicide because she did not want her fiance Gayle Pantoja finding her. The patient denied the suicidal ideation to the police and crisis team was dispatched to her location--patient reports that she no longer had thoughts to harm herself and changed her mind, but is having delusion and AVH including that she is in a alternate reality and has discovered that her purpose is to fight monsters and save the real reality from the apocalypse and compared her reality and cl to the show Supernatural--reports that this reality is not real and no one in this reality is real including this RN and her fiance. Patient reports that she planned to commit suicide because of the overwhelming new responsibility of preventing the apocalypse and she hoped killing herself in this reality would allow her to transcend into the real reality where she could achieve her cl. The patient now denies that she is suicidal and plans to find another way to change realities. The patient is joy for safety. Appearance/Hygiene:  well-appearing, hospital attire, seated in bed, fair grooming, and good hygiene   Motor Behavior: The patient has continued to rock back and forth since arriving at [facility]. The patient seems to be unable to control this movement. The patient has a steady even gait with no abnormalities.     Attitude: cooperative  Affect: anxiety   Speech: normal pitch and normal volume  Mood: anxious   Thought Processes: Goal directed, [place], Illogical, and Grandiose  Perceptions: Present - Third Party Hallucinations Reports AVH including Lucifer's voice in his head calling her Conor which led her to realizing her purpose of stopping the apocalypse because in Supernatural a character has Lucifer in his head calling him Conor as well. Reports VH of seeing monsters throughout [place] including a \"Djinn\" (Bel[place]ian demon) in the woods by her apt. Thought content: Patient reports that she has been sought to serve a higher purpose in saving the real reality, but has to overcome the obstacle of finding a way to the real reality. 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 Denies any substance abuse, alcohol or nicotine use. Reports a small, but strong support system including friends and her fiance. Denies depression, but endorses anxiety related to her delusions and hallucinations. No changes in appetite or sleep pattern (6-8hr). No recent stressors other than those related to delusions. Patient is asking to be discharged to her home out of fear that she will be fired from her new job at [employer] because she is not allowed to miss a day for x3mth. Stated, \"If I am admitted you will ruin my life\". C-SSRS flowsheet is  Complete. Psychiatric History (including current outpatient provider and past inpatient admissions): Bulimia, Bipolar I, Depression, Gender dysmorphia, self-harm, schizoaffective, PTSD, borderline, psychotic disorder with delusions    Inpatient: Multiple admissions to [facility] and past admissions [facility] for inpatient psychiatry. Most recent listed below.    4/25-4/26/22 Rose Bustamante MD: SI c delusional thinking  2/22-2/24/22 Rose Bustamante MD: Delusions of Djinn  1/11-1/14/22 Rose Bustamante MD: Delusional thinking with orlin symptoms. Outpatient:   Satya Monge MD at [facility], 1x/wk medications management. Matilda Mode for therapy 1x/wk at [facility]    Medications: Reports compliance, reports that she is also on Seroquel. No current facility-administered medications on file prior to encounter. Current Outpatient Medications on File Prior to Encounter   Medication Sig Dispense Refill    paliperidone (INVEGA) 6 MG extended release tablet Take 12 mg by mouth every morning      hydrOXYzine (ATARAX) 50 MG tablet Take 50 mg by mouth 2 times daily as needed for Anxiety       Access to Firearms: Denies    ASSESSMENT FOR IMMINENT FUTURE DANGER:    RISK FACTORS:    [x]  Age <25 or >49   [x]  Male gender---transgender female to male.     []  Depressed mood   []  Active suicidal ideation   [x]  Suicide plan   []  Suicide attempt   [x]  Access to lethal means   [x]  Prior suicide attempt   []  Active substance abuse (if yes pleases add details )   [x]  Highly impulsive behaviors   []  Not attending to self-care/ADLs    []  Recent significant loss   []  Chronic pain or medical illness   []  Social isolation   []  History of violence (if yes please elaborate )   [x]  Active psychosis   []  Cognitive impairment    []  No outpatient services in place   []  Medication noncompliance   [x]  No collateral information to support safety  [x] Self- injurious/ Self-harm behavior    PROTECTIVE FACTORS:  [] Age >25 and <55  [] Female gender   [x] Denies depression  [x] Denies suicidal ideation  [] Does not have lethal plan   [x] Does not have access to guns or weapons  [x] Patient is verbally joy for safety  [] No prior suicide attempts  [x] No active substance abuse  [x] Patient has social or family support  [] No active psychosis or cognitive dysfunction  [] Physically healthy  [x] Has outpatient services in place  [x] Compliant with recommended medications  [] Collateral information from

## 2022-08-13 NOTE — ED NOTES
Attempted to contact pt's Nivia Brady at [phone]. Left .           Carmencita Davidson  08/13/22 0018

## 2022-08-13 NOTE — PROGRESS NOTES
Pt arrived on floor by wheelchair at approximately 0620. VS WNL. Pt calm, cooperative. Did not want to complete paperwork at this time. Very concerned about losing job by being admitted.
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
## 2022-08-13 NOTE — PRE-CERTIFICATION NOTE
Attempted pre-certification with [employer]. Unable to complete. Attempted both parent, and pt as subscriber. Subscriber ID not accepted.
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
## 2022-08-13 NOTE — ED NOTES
Level of Care Disposition: Admit      Patient was seen by ED provider and [facility] staff. The case presented to psychiatric provider on-call Margarita Chen MD.  Based on the ED evaluation and information presented to the provider by Anthony Miranda it is the recommendation of the on call psychiatric provider that inpatient hospitalization is the least restrictive environment for the patient at this time. The patient will be admitted to the inpatient unit.  Admitting provider did not order suicide precautions based on patient denying current Guy Reyes RN  08/13/22 1115

## 2022-08-13 NOTE — ED NOTES
To ABI 23:50 in street attire. Changed into appropriate gown, and placed in treatment room B2. Appears anxious, and is rocking back and forth. Currently in treatment room. Will continue to monitor. Belongings in locker.      Carmencita Davidson  08/13/22 0000

## 2022-08-13 NOTE — ED NOTES
Appears to be resting peacefully in treatment room. Eyes are closed, respiration even, and easy. Will continue to monitor for pt safety.       Karen Alegria  08/13/22 4671

## 2022-08-14 LAB
CHOLESTEROL, TOTAL: 141 MG/DL (ref 0–199)
ESTIMATED AVERAGE GLUCOSE: 111.2 MG/DL
HBA1C MFR BLD: 5.5 %
HDLC SERPL-MCNC: 43 MG/DL (ref 40–60)
LDL CHOLESTEROL CALCULATED: 77 MG/DL
TRIGL SERPL-MCNC: 104 MG/DL (ref 0–150)
VLDLC SERPL CALC-MCNC: 21 MG/DL

## 2022-08-14 PROCEDURE — 1240000000 HC EMOTIONAL WELLNESS R&B

## 2022-08-14 PROCEDURE — 6370000000 HC RX 637 (ALT 250 FOR IP): Performed by: PSYCHIATRY & NEUROLOGY

## 2022-08-14 RX ORDER — ONDANSETRON 4 MG/1
4 TABLET, ORALLY DISINTEGRATING ORAL EVERY 8 HOURS PRN
Status: DISCONTINUED | OUTPATIENT
Start: 2022-08-14 | End: 2022-08-15 | Stop reason: HOSPADM

## 2022-08-14 RX ADMIN — ACETAMINOPHEN 650 MG: 325 TABLET ORAL at 21:19

## 2022-08-14 RX ADMIN — ONDANSETRON 4 MG: 4 TABLET, ORALLY DISINTEGRATING ORAL at 21:45

## 2022-08-14 RX ADMIN — PALIPERIDONE 12 MG: 3 TABLET, EXTENDED RELEASE ORAL at 08:56

## 2022-08-14 RX ADMIN — IBUPROFEN 400 MG: 400 TABLET, FILM COATED ORAL at 21:20

## 2022-08-14 RX ADMIN — OXCARBAZEPINE 150 MG: 150 TABLET, FILM COATED ORAL at 08:57

## 2022-08-14 ASSESSMENT — PAIN SCALES - GENERAL
PAINLEVEL_OUTOF10: 0
PAINLEVEL_OUTOF10: 6

## 2022-08-14 ASSESSMENT — PAIN DESCRIPTION - LOCATION: LOCATION: HEAD

## 2022-08-14 ASSESSMENT — PAIN DESCRIPTION - DESCRIPTORS: DESCRIPTORS: ACHING;THROBBING

## 2022-08-14 ASSESSMENT — PAIN DESCRIPTION - ORIENTATION: ORIENTATION: MID

## 2022-08-14 NOTE — PROGRESS NOTES
Writer went in to tell pt it was time for dinner, response was, \"no I'm fine. \"  Pt asked when the doctor would be in because \"she told me I'd be going home today. \"  Writer explained to pt that doctor had already left for the day. Pt stated with smile on face, \"okay, well she lied to me to my face. She said I'd be going home today. \"  Pt remains seclusive to room. Refusing to eat meals, minimal drinking, if any. Still feels like there is an evil force on the unit that is dangerous to everyone outside his room.

## 2022-08-14 NOTE — PLAN OF CARE
Problem: Confusion  Goal: Confusion, delirium, dementia, or psychosis is improved or at baseline  Description: INTERVENTIONS:  1. Assess for possible contributors to thought disturbance, including medications, impaired vision or hearing, underlying metabolic abnormalities, dehydration, psychiatric diagnoses, and notify attending LIP  2. [place] high risk fall precautions, as indicated  3. Provide frequent short contacts to provide reality reorientation, refocusing and direction  4. Decrease environmental stimuli, including noise as appropriate  5. Monitor and intervene to maintain adequate nutrition, hydration, elimination, sleep and activity  6. If unable to ensure safety without constant attention obtain sitter and review sitter guidelines with assigned personnel  7. Initiate Psychosocial CNS and Spiritual Care consult, as indicated  Outcome: Jose M Barney has been withdrawn to room this shift. Patient denies current SI/HI, denies A/V/H. Cooperative with care but evasive on approach. Medication compliant.

## 2022-08-14 NOTE — PLAN OF CARE
Problem: Confusion  Goal: Confusion, delirium, dementia, or psychosis is improved or at baseline  Description: INTERVENTIONS:  1. Assess for possible contributors to thought disturbance, including medications, impaired vision or hearing, underlying metabolic abnormalities, dehydration, psychiatric diagnoses, and notify attending LIP  2. [place] high risk fall precautions, as indicated  3. Provide frequent short contacts to provide reality reorientation, refocusing and direction  4. Decrease environmental stimuli, including noise as appropriate  5. Monitor and intervene to maintain adequate nutrition, hydration, elimination, sleep and activity  6. If unable to ensure safety without constant attention obtain sitter and review sitter guidelines with assigned personnel  7. Initiate Psychosocial CNS and Spiritual Care consult, as indicated  8/14/2022 1445 by Janus Hodgkins, RN  Outcome: Progressing   Pt has been regressed to room all shift. Pt did come out for am meds, but has been in room ever since. Pt has been noted to be underneath desk in room, rocking back and forth, holding his black pouch of herbs. Sanjeev De La Cruz refused to eat or drink stating \"nothing you bring in from outside of this room is safe. I'm okay. I thought I was going to get to go home today and then I could figure it out. \"  Pt feels like there is a monster or dark energy on the unit and states \"If I came out of this room and tried to kill it or stab it, it would just keep me here longer. It makes sense doesn't it, perfect place for it to hide out. Nobody will think its real because we are in a psych wahl\". Pt states no HI/SI, hears auditory hallucinations of the monster or dark energy but cannot describe the noises he hears. .  Roro Madrigal did encourage pt to eat and drink and explained that lack thereof could cause dehydration and low blood sugar. Pt states \"I'm good\".

## 2022-08-15 VITALS
SYSTOLIC BLOOD PRESSURE: 155 MMHG | OXYGEN SATURATION: 99 % | HEIGHT: 67 IN | WEIGHT: 200 LBS | DIASTOLIC BLOOD PRESSURE: 77 MMHG | RESPIRATION RATE: 16 BRPM | TEMPERATURE: 98.2 F | HEART RATE: 72 BPM | BODY MASS INDEX: 31.39 KG/M2

## 2022-08-15 PROCEDURE — 5130000000 HC BRIDGE APPOINTMENT

## 2022-08-15 PROCEDURE — 99239 HOSP IP/OBS DSCHRG MGMT >30: CPT | Performed by: PSYCHIATRY & NEUROLOGY

## 2022-08-15 RX ORDER — LITHIUM CARBONATE 300 MG/1
300 TABLET, FILM COATED, EXTENDED RELEASE ORAL NIGHTLY
Qty: 14 TABLET | Refills: 0 | Status: SHIPPED | OUTPATIENT
Start: 2022-08-15 | End: 2022-08-24

## 2022-08-15 RX ORDER — HYDROXYZINE 50 MG/1
50 TABLET, FILM COATED ORAL 2 TIMES DAILY PRN
Qty: 20 TABLET | Refills: 0 | Status: SHIPPED | OUTPATIENT
Start: 2022-08-15

## 2022-08-15 RX ORDER — QUETIAPINE FUMARATE 200 MG/1
200 TABLET, FILM COATED ORAL NIGHTLY
Qty: 60 TABLET | Refills: 0 | Status: SHIPPED | OUTPATIENT
Start: 2022-08-15

## 2022-08-15 RX ORDER — LITHIUM CARBONATE 300 MG/1
300 TABLET, FILM COATED, EXTENDED RELEASE ORAL NIGHTLY
Status: DISCONTINUED | OUTPATIENT
Start: 2022-08-15 | End: 2022-08-15 | Stop reason: HOSPADM

## 2022-08-15 RX ORDER — PALIPERIDONE 6 MG/1
12 TABLET, EXTENDED RELEASE ORAL EVERY MORNING
Qty: 30 TABLET | Refills: 0 | Status: SHIPPED | OUTPATIENT
Start: 2022-08-15

## 2022-08-15 NOTE — PLAN OF CARE
Problem: Pain  Goal: Verbalizes/displays adequate comfort level or baseline comfort level  Outcome: Progressing     Problem: Confusion  Goal: Confusion, delirium, dementia, or psychosis is improved or at baseline  Description: INTERVENTIONS:  1. Assess for possible contributors to thought disturbance, including medications, impaired vision or hearing, underlying metabolic abnormalities, dehydration, psychiatric diagnoses, and notify attending LIP  2. [place] high risk fall precautions, as indicated  3. Provide frequent short contacts to provide reality reorientation, refocusing and direction  4. Decrease environmental stimuli, including noise as appropriate  5. Monitor and intervene to maintain adequate nutrition, hydration, elimination, sleep and activity  6. If unable to ensure safety without constant attention obtain sitter and review sitter guidelines with assigned personnel  7. Initiate Psychosocial CNS and Spiritual Care consult, as indicated  8/15/2022 0020 by Candi Conteh RN  Outcome: Anthony Hays has been withdrawn to room this shift. Patient refused trileptal stating he thought it was causing a headache with n/v. PRN Tylenol and Ibuprofen given as requested for ha @ 2120. Medication effective. Placed call to Dr. Paul Goff to report patient c/o n/v and received new order for PRN Zofran which was given at 2145 and effective. Patient denies current SI/HI, denies A/V/H. Pleasant on approach and cooperative with care.
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
## 2022-08-15 NOTE — DISCHARGE INSTRUCTIONS
Advanced Directives:  Patient does not have a surrogate decision maker appointed   Name (if yes): declined Phone Number:   Patient does not have a psychiatric and/ or medical advanced directive or power of . Patient was offered psychiatric and/ or medical advanced directive or power of  information/completion but declined to complete   Why not? declined    Discharge Planning is Complete. Discharge Date: 8/15/2022  Reason for Hospitalization: Failure to care for self  Discharge Diagnosis: Borderline Personality Disorder  Discharging to: Private Domicile    Your instructions: Your physician here was Hali Hammer, *. If you have any questions please call the unit at [phone] for the adult unit or [phone] for [facility]. Please note that we have a patient family advisory Crow that meets the second Wednesday of January and the second Wednesday of July at [address] [facility] in [place] at [facility]. Department leadership would love for you to attend to give feedback on what we are doing well and areas in which we can improve our patient care. Please come if you are able and feel free to reach out to [facility] at [phone] with any questions. The crisis number for [facility] is [phone] (SAVE). This crisis line is available 24 hours a day, seven days a week. Please follow up with your PCP regarding any pending labs. You are connected to psychiatric medicine management with Dr. Cira Rivera at [facility].   You made an appointment for aftercare as follows:  Name of Provider: Dr. Pat Logan MD  Provider specialty/license: psychiatry  Date and time of appointment:  8/15/2022   The type/s of services requested are: medicine management  Agency name: [facility]  Address: Telehealth  Phone Number: [phone]  Special instructions (what to bring to appointment, etc.):      Discharge Completed By: Orquidea Kumari [address] [facility]  Fax to: Follow up provider name and number  Dr. Juan C Malagon [phone]    The following personal items were collected during your admission and were returned to you:    Belongings  Dental Appliances: None  Vision - Corrective Lenses: Eyeglasses, At bedside  Hearing Aid: None  Clothing: Pants, Shirt (at bedside)  Jewelry: None  Body Piercings Removed: N/A  Electronic Devices: Cell Phone (in safe)  Weapons (Notify Protective Services/Security): None  Other Valuables: Money, CARPIO, Keys, Other (Comment) (wallet, keys, $1 in cash, 3 MCs and 1 Visa, DL - all locked in safe; small bag of herbs in locker with 2 bottles noncontrolled meds)  Home Medications: Locked (in locker; noncontrolled)  Valuables Given To: Paresh Bess, Patient  Provide Name(s) of Who Valuable(s) Were Given To: Brendon Score    By signing below, I understand and acknowledge receipt of the instructions indicated above.

## 2022-08-15 NOTE — PLAN OF CARE
[facility]  Discharge Note    Pt discharged with followings belongings:   Dental Appliances: None  Vision - Corrective Lenses: Eyeglasses, At bedside  Hearing Aid: None  Jewelry: None  Body Piercings Removed: N/A  Clothing: Pants, Shirt (at bedside)  Other Valuables: Money, Wallet, Keys, Other (Comment) (wallet, keys, $1 in cash, 3 MCs and 1 Visa, DL - all locked in safe; small bag of herbs in locker with 2 bottles noncontrolled meds)   Valuables sent home with patient. Patient education on aftercare instructions: yes  Information faxed to Dr. Isabel Torres by Charge RN  at 12:52 PM .Patient verbalize understanding of AVS:  yes. Status EXAM upon discharge:  Mental Status and Behavioral Exam  Normal: No  Level of Assistance: Independent/Self  Facial Expression: Flat, Worried  Affect: Appropriate  Level of Consciousness: Alert  Frequency of Checks: 4 times per hour, close  Mood:Normal: No  Mood: Anxious  Motor Activity:Normal: No  Motor Activity: Decreased  Eye Contact: Good  Observed Behavior: Cooperative  Sexual Misconduct History: Current - no  Preception: [place] to person, [place] to time, [place] to place  Attention:Normal: Yes  Thought Processes: Blocking  Thought Content:Normal: No  Thought Content: Paranoia, Delusions  Depression Symptoms: Isolative  Anxiety Symptoms: Generalized  Janelle Symptoms: No problems reported or observed. Hallucinations:  Auditory (comment)  Delusions: Yes  Delusions: Paranoid, Anglican  Memory:Normal: No  Memory: Poor recent  Insight and Judgment: No  Insight and Judgment: Poor judgment, Poor insight    Tobacco Screening:  Practical Counseling, on admission, diane X, if applicable and completed (first 3 are required if patient doesn't refuse):            ( ) Recognizing danger situations (included triggers and roadblocks)                    ( ) Coping skills (new ways to manage stress,relaxation techniques, changing routine, distraction) ( ) Basic information about quitting (benefits of quitting, techniques in how to quit, available resources  ( ) Referral for counseling faxed to [facility]                                                                                                                   ( x) Patient refused counseling  ( ) Patient refused referral  ( ) Patient refused prescription upon discharge  ( ) Patient has not smoked in the last 30 days    Metabolic Screening:    Lab Results   Component Value Date    LABA1C 5.5 08/13/2022       Lab Results   Component Value Date    CHOL 141 08/13/2022    CHOL 140 04/25/2022    CHOL 156 02/22/2022    CHOL 111 06/08/2021     Lab Results   Component Value Date    TRIG 104 08/13/2022    TRIG 106 04/25/2022    TRIG 49 02/22/2022    TRIG 77 06/08/2021     Lab Results   Component Value Date    HDL 43 08/13/2022    HDL 46 04/25/2022    HDL 68 (H) 02/22/2022    HDL 35 (L) 06/08/2021     No components found for: [facility]  Lab Results   Component Value Date    LABVLDL 21 08/13/2022    LABVLDL 21 04/25/2022    LABVLDL 10 02/22/2022    LABVLDL 15 06/08/2021       Rogelio Cherry RN

## 2022-08-18 ENCOUNTER — HOSPITAL ENCOUNTER (EMERGENCY)
Age: 22
Discharge: HOME | End: 2022-08-18
Attending: EMERGENCY MEDICINE
Payer: COMMERCIAL

## 2022-08-18 VITALS
RESPIRATION RATE: 16 BRPM | TEMPERATURE: 98.1 F | HEART RATE: 84 BPM | OXYGEN SATURATION: 99 % | SYSTOLIC BLOOD PRESSURE: 131 MMHG | DIASTOLIC BLOOD PRESSURE: 89 MMHG

## 2022-08-18 DIAGNOSIS — Z00.8 ENCOUNTER FOR PSYCHOLOGICAL EVALUATION: ICD-10-CM

## 2022-08-18 DIAGNOSIS — R45.851 SUICIDAL IDEATION: Primary | ICD-10-CM

## 2022-08-18 LAB
A/G RATIO: 2.3 (ref 1.1–2.2)
ACETAMINOPHEN LEVEL: <5 UG/ML (ref 10–30)
ALBUMIN SERPL-MCNC: 5.2 G/DL (ref 3.4–5)
ALP BLD-CCNC: 82 U/L (ref 40–129)
ALT SERPL-CCNC: 16 U/L (ref 10–40)
AMPHETAMINE SCREEN, URINE: NORMAL
ANION GAP SERPL CALCULATED.3IONS-SCNC: 15 MMOL/L (ref 3–16)
AST SERPL-CCNC: 16 U/L (ref 15–37)
BACTERIA: ABNORMAL /HPF
BARBITURATE SCREEN URINE: NORMAL
BASOPHILS ABSOLUTE: 0.1 K/UL (ref 0–0.2)
BASOPHILS RELATIVE PERCENT: 0.8 %
BENZODIAZEPINE SCREEN, URINE: NORMAL
BILIRUB SERPL-MCNC: 0.3 MG/DL (ref 0–1)
BILIRUBIN URINE: NEGATIVE
BLOOD, URINE: NEGATIVE
BUN BLDV-MCNC: 10 MG/DL (ref 7–20)
CALCIUM SERPL-MCNC: 10.3 MG/DL (ref 8.3–10.6)
CANNABINOID SCREEN URINE: NORMAL
CHLORIDE BLD-SCNC: 100 MMOL/L (ref 99–110)
CLARITY: CLEAR
CO2: 23 MMOL/L (ref 21–32)
COCAINE METABOLITE SCREEN URINE: NORMAL
COLOR: ABNORMAL
CREAT SERPL-MCNC: 0.7 MG/DL (ref 0.6–1.1)
EOSINOPHILS ABSOLUTE: 0.1 K/UL (ref 0–0.6)
EOSINOPHILS RELATIVE PERCENT: 1.4 %
EPITHELIAL CELLS, UA: ABNORMAL /HPF (ref 0–5)
ETHANOL: NORMAL MG/DL (ref 0–0.08)
GFR AFRICAN AMERICAN: >60
GFR NON-AFRICAN AMERICAN: >60
GLUCOSE BLD-MCNC: 110 MG/DL (ref 70–99)
GLUCOSE URINE: NEGATIVE MG/DL
HCG(URINE) PREGNANCY TEST: NEGATIVE
HCT VFR BLD CALC: 40.1 % (ref 36–48)
HEMOGLOBIN: 13.4 G/DL (ref 12–16)
INFLUENZA A: NOT DETECTED
INFLUENZA B: NOT DETECTED
KETONES, URINE: NEGATIVE MG/DL
LEUKOCYTE ESTERASE, URINE: ABNORMAL
LITHIUM DOSE AMOUNT: ABNORMAL
LITHIUM LEVEL: <0.1 MMOL/L (ref 0.6–1.2)
LYMPHOCYTES ABSOLUTE: 1.9 K/UL (ref 1–5.1)
LYMPHOCYTES RELATIVE PERCENT: 25.1 %
Lab: NORMAL
MCH RBC QN AUTO: 28.7 PG (ref 26–34)
MCHC RBC AUTO-ENTMCNC: 33.5 G/DL (ref 31–36)
MCV RBC AUTO: 85.7 FL (ref 80–100)
METHADONE SCREEN, URINE: NORMAL
MICROSCOPIC EXAMINATION: YES
MONOCYTES ABSOLUTE: 0.5 K/UL (ref 0–1.3)
MONOCYTES RELATIVE PERCENT: 6.7 %
NEUTROPHILS ABSOLUTE: 4.9 K/UL (ref 1.7–7.7)
NEUTROPHILS RELATIVE PERCENT: 66 %
NITRITE, URINE: NEGATIVE
OPIATE SCREEN URINE: NORMAL
OXYCODONE URINE: NORMAL
PDW BLD-RTO: 13.2 % (ref 12.4–15.4)
PH UA: 7
PH UA: 7 (ref 5–8)
PHENCYCLIDINE SCREEN URINE: NORMAL
PLATELET # BLD: 384 K/UL (ref 135–450)
PMV BLD AUTO: 6.9 FL (ref 5–10.5)
POTASSIUM SERPL-SCNC: 4.1 MMOL/L (ref 3.5–5.1)
PROPOXYPHENE SCREEN: NORMAL
PROTEIN UA: NEGATIVE MG/DL
RBC # BLD: 4.68 M/UL (ref 4–5.2)
RBC UA: ABNORMAL /HPF (ref 0–4)
SALICYLATE, SERUM: <0.3 MG/DL (ref 15–30)
SARS-COV-2 RNA, RT PCR: NOT DETECTED
SODIUM BLD-SCNC: 138 MMOL/L (ref 136–145)
SPECIFIC GRAVITY UA: 1.01 (ref 1–1.03)
TOTAL PROTEIN: 7.5 G/DL (ref 6.4–8.2)
URINE REFLEX TO CULTURE: ABNORMAL
URINE TYPE: ABNORMAL
UROBILINOGEN, URINE: 0.2 E.U./DL
WBC # BLD: 7.5 K/UL (ref 4–11)
WBC UA: ABNORMAL /HPF (ref 0–5)

## 2022-08-18 PROCEDURE — 84703 CHORIONIC GONADOTROPIN ASSAY: CPT

## 2022-08-18 PROCEDURE — 80053 COMPREHEN METABOLIC PANEL: CPT

## 2022-08-18 PROCEDURE — 80307 DRUG TEST PRSMV CHEM ANLYZR: CPT

## 2022-08-18 PROCEDURE — 82077 ASSAY SPEC XCP UR&BREATH IA: CPT

## 2022-08-18 PROCEDURE — 80143 DRUG ASSAY ACETAMINOPHEN: CPT

## 2022-08-18 PROCEDURE — 87086 URINE CULTURE/COLONY COUNT: CPT

## 2022-08-18 PROCEDURE — 36415 COLL VENOUS BLD VENIPUNCTURE: CPT

## 2022-08-18 PROCEDURE — 85025 COMPLETE CBC W/AUTO DIFF WBC: CPT

## 2022-08-18 PROCEDURE — 81001 URINALYSIS AUTO W/SCOPE: CPT

## 2022-08-18 PROCEDURE — 99285 EMERGENCY DEPT VISIT HI MDM: CPT

## 2022-08-18 PROCEDURE — 80178 ASSAY OF LITHIUM: CPT

## 2022-08-18 PROCEDURE — 80179 DRUG ASSAY SALICYLATE: CPT

## 2022-08-18 PROCEDURE — 87636 SARSCOV2 & INF A&B AMP PRB: CPT

## 2022-08-18 ASSESSMENT — LIFESTYLE VARIABLES
HOW MANY STANDARD DRINKS CONTAINING ALCOHOL DO YOU HAVE ON A TYPICAL DAY: PATIENT DOES NOT DRINK
HOW OFTEN DO YOU HAVE A DRINK CONTAINING ALCOHOL: NEVER

## 2022-08-18 NOTE — ED NOTES
Attempted to contact Gretel Jacobsen, pt's partner. Left NATASHA.   [employer]  @ [address]  08/18/22 0077

## 2022-08-18 NOTE — DISCHARGE INSTRUCTIONS
The crisis number for [facility] is [phone] (SAVE). This crisis line is available 24 hours a day, seven days a week. The crisis number for [facility] is 281-CARE. This crisis line is available 24 hours a day, seven days a week. The National Suicide and Crisis Hotline Number is 65. You can call, chat, or text this number at any time to access emergency mental health services. Outpatient Mental Health Treatment Services    Mental Health Therapy and Psychiatry (Medication Management)  [facility]  Location: [address]  Phone: [phone]    [facility]  Location: [facility] in [place] [address], [address]. Phone: [phone]    [address] [place] [address]  Location: Multiple offices in the [facility]  Phone: [phone] or [address]  Locations: Multiple locations in [place] and [place]  Phone: [phone]    [facility]  Location: [address] [place] [address]  Phone: [address]  Location: Multiple offices in [place]   Phone: Josemanuel Estrada ([phone])    Ledy Barajas  Location: [facility] [address]  Phone: [phone] ([phone])    [facility] [address] [facility])  Location: [address] [place] [address], [address]  Phone: [phone]    [facility]  Location: offices in [address] [place] [address]  Phone: [address] [facility]  Location: [place], [place]  Phone: [phone]    Dr. César Ivy   Location: [address]    Phone: [facility]  Location: [address]    Phone: [phone]    Mental Health Therapy Only, No Psychiatry (Medication Management)    [facility]  Location: [address]  Phone: [phone]    [facility]  Location: [address] [place] [address], [facility], [place], Charan Loaiza  Phone: [phone]         Therapist that accept [address]  [address]   [address]   [phone]     [address] [facility], [address] [facility]   [phone]     Orlando Rosenthal  M[address]  [place], [address]   [phone]     [facility] [address] [place] [address], [facility] [address]   [phone] [facility]   [address]  [address]   Call Felipa Elda [phone]     [facility]  Lexus Milad  [address] [facility]   [address] [facility] [place]  [place], [address]   [phone]          .

## 2022-08-18 NOTE — ED NOTES
To  8283 from triage in street attire. Cooperative with belongings exchange, and changed into appropriate hospital gown. Belongings in locker. Will continue to monitor for pt safety.      Claudia Sow  08/18/22 1101

## 2022-08-18 NOTE — ED NOTES
Patient voiced he is not suicidal. Contracts for safety. Discussed other methods to handle stress.       Cathy Dominique RN  08/18/22 8124
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
## 2022-08-18 NOTE — ED NOTES
Currently resting peacefully in treatment room. No noted distress. No needs, or complaints. Will continue to monitor for pt safety.       Claudia Sow  08/18/22 0066

## 2022-08-18 NOTE — ED NOTES
Presenting Problem:Patient presents to ED/ABI on a SOB/ suicidal ideations. Patient called crisis line while sitting in [facility] parking lot. Stated that she was going to overdose on lithium. Patient located by [facility] and brought in on a statement of belief. Patient denies suicidal ideations at present but does endorse A/V hallucinations. Appearance/Hygiene:  well-appearing   Motor Behavior: normal   Attitude: cooperative  Affect: flat affect   Speech: normal pitch and normal volume  Mood: within normal limits   Thought Processes:  evasive  Perceptions: Present - endorses A/V hallucinations     Thought content: manipulative   Orientation: A&Ox4   Memory: intact  Concentration: Fair    Insight/ judgement: impaired insight      Psychosocial and contextual factors: patient transgender female to male. Lives with female fiance. C-SSRS flowsheet is  Complete.     Psychiatric History (including current outpatient provider and past inpatient admissions): recent admission to [address] [place] [address] Road to Firearms: no    ASSESSMENT FOR IMMINENT FUTURE DANGER:    RISK FACTORS:    [x]  Age <25 or >49   []  Male gender   []  Depressed mood   []  Active suicidal ideation   []  Suicide plan   []  Suicide attempt   []  Access to lethal means   [x]  Prior suicide attempt   []  Active substance abuse (if yes pleases add details )   []  Highly impulsive behaviors   []  Not attending to self-care/ADLs    []  Recent significant loss   []  Chronic pain or medical illness   []  Social isolation   []  History of violence (if yes please elaborate )   []  Active psychosis   []  Cognitive impairment    []  No outpatient services in place   []  Medication noncompliance   []  No collateral information to support safety  [x] Self- injurious/ Self-harm behavior    PROTECTIVE FACTORS:  [] Age >25 and <55  [x] Female gender   [] Denies depression  [x] Denies suicidal ideation  [] Does not have lethal plan   [] Does not have access to guns or weapons  [x] Patient is verbally joy for safety  [] No prior suicide attempts  [] No active substance abuse  [] Patient has social or family support  [] No active psychosis or cognitive dysfunction  [] Physically healthy  [x] Has outpatient services in place  [] Compliant with recommended medications  [] Collateral information from  supports patient safety   [] Patient is future oriented with plans to           [facility], RN  08/18/22 8558

## 2022-08-18 NOTE — ED NOTES
Alanna Bingham (partner) -[phone]      Alanna Bingham reports she fell a sleep a little after midnight, and was unaware pt left the apartment. She reports the two of them ate dinner, and had a \"really nice,\" conversation before Alanna Bingham went to bed, and at that time pt seemed quite well. There were no signs or indication she felt suicidal. Pt did report however that she continues to see, and hear the devil, but is never specific as what the devil says to her. Pt is also continuing to speak to Alanna Bingham about being in between [place] different realities. \" Alanna Binghma feels pt's medications may not be working. 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 Alanna Bingham is safe with pt discharge. Pt is reportedly taking her medications as prescribed, although lately she has missed a few of her appointments with her therapist.     Alanna Bingham is supportive of discharge, and volunteered to provide transportation home for pt.           Jeff Dong  08/18/22 9930

## 2022-08-19 LAB — URINE CULTURE, ROUTINE: NORMAL

## 2022-08-20 ENCOUNTER — HOSPITAL ENCOUNTER (INPATIENT)
Age: 22
LOS: 3 days | Discharge: HOME | DRG: 885 | End: 2022-08-24
Attending: STUDENT IN AN ORGANIZED HEALTH CARE EDUCATION/TRAINING PROGRAM | Admitting: PSYCHIATRY & NEUROLOGY
Payer: COMMERCIAL

## 2022-08-20 DIAGNOSIS — R45.851 SUICIDAL INTENT: Primary | ICD-10-CM

## 2022-08-20 DIAGNOSIS — T50.902A INTENTIONAL DRUG OVERDOSE, INITIAL ENCOUNTER (HCC): ICD-10-CM

## 2022-08-20 PROCEDURE — 99285 EMERGENCY DEPT VISIT HI MDM: CPT

## 2022-08-21 PROBLEM — F39 MOOD DISORDER (HCC): Status: ACTIVE | Noted: 2022-08-21

## 2022-08-21 LAB
A/G RATIO: 1.7 (ref 1.1–2.2)
ACETAMINOPHEN LEVEL: <5 UG/ML (ref 10–30)
ALBUMIN SERPL-MCNC: 4.9 G/DL (ref 3.4–5)
ALP BLD-CCNC: 86 U/L (ref 40–129)
ALT SERPL-CCNC: 14 U/L (ref 10–40)
AMPHETAMINE SCREEN, URINE: NORMAL
ANION GAP SERPL CALCULATED.3IONS-SCNC: 16 MMOL/L (ref 3–16)
AST SERPL-CCNC: 13 U/L (ref 15–37)
BARBITURATE SCREEN URINE: NORMAL
BASOPHILS ABSOLUTE: 0.1 K/UL (ref 0–0.2)
BASOPHILS RELATIVE PERCENT: 1.3 %
BENZODIAZEPINE SCREEN, URINE: NORMAL
BILIRUB SERPL-MCNC: <0.2 MG/DL (ref 0–1)
BUN BLDV-MCNC: 10 MG/DL (ref 7–20)
CALCIUM SERPL-MCNC: 9.2 MG/DL (ref 8.3–10.6)
CANNABINOID SCREEN URINE: NORMAL
CHLORIDE BLD-SCNC: 101 MMOL/L (ref 99–110)
CO2: 21 MMOL/L (ref 21–32)
COCAINE METABOLITE SCREEN URINE: NORMAL
CREAT SERPL-MCNC: 0.6 MG/DL (ref 0.6–1.1)
EKG ATRIAL RATE: 105 BPM
EKG ATRIAL RATE: 107 BPM
EKG DIAGNOSIS: NORMAL
EKG DIAGNOSIS: NORMAL
EKG P AXIS: 26 DEGREES
EKG P AXIS: 42 DEGREES
EKG P-R INTERVAL: 130 MS
EKG P-R INTERVAL: 132 MS
EKG Q-T INTERVAL: 310 MS
EKG Q-T INTERVAL: 324 MS
EKG QRS DURATION: 80 MS
EKG QRS DURATION: 84 MS
EKG QTC CALCULATION (BAZETT): 413 MS
EKG QTC CALCULATION (BAZETT): 428 MS
EKG R AXIS: 38 DEGREES
EKG R AXIS: 58 DEGREES
EKG T AXIS: 28 DEGREES
EKG T AXIS: 39 DEGREES
EKG VENTRICULAR RATE: 105 BPM
EKG VENTRICULAR RATE: 107 BPM
EOSINOPHILS ABSOLUTE: 0.2 K/UL (ref 0–0.6)
EOSINOPHILS RELATIVE PERCENT: 2.4 %
ETHANOL: 33 MG/DL (ref 0–0.08)
GFR AFRICAN AMERICAN: >60
GFR NON-AFRICAN AMERICAN: >60
GLUCOSE BLD-MCNC: 140 MG/DL (ref 70–99)
HCG QUALITATIVE: NEGATIVE
HCT VFR BLD CALC: 38.2 % (ref 36–48)
HEMOGLOBIN: 13.2 G/DL (ref 12–16)
LITHIUM DOSE AMOUNT: ABNORMAL
LITHIUM LEVEL: <0.1 MMOL/L (ref 0.6–1.2)
LYMPHOCYTES ABSOLUTE: 1.8 K/UL (ref 1–5.1)
LYMPHOCYTES RELATIVE PERCENT: 23 %
Lab: NORMAL
MCH RBC QN AUTO: 29.6 PG (ref 26–34)
MCHC RBC AUTO-ENTMCNC: 34.6 G/DL (ref 31–36)
MCV RBC AUTO: 85.6 FL (ref 80–100)
METHADONE SCREEN, URINE: NORMAL
MONOCYTES ABSOLUTE: 0.5 K/UL (ref 0–1.3)
MONOCYTES RELATIVE PERCENT: 6.9 %
NEUTROPHILS ABSOLUTE: 5.1 K/UL (ref 1.7–7.7)
NEUTROPHILS RELATIVE PERCENT: 66.4 %
OPIATE SCREEN URINE: NORMAL
OXYCODONE URINE: NORMAL
PDW BLD-RTO: 13 % (ref 12.4–15.4)
PH UA: 6
PHENCYCLIDINE SCREEN URINE: NORMAL
PLATELET # BLD: 356 K/UL (ref 135–450)
PMV BLD AUTO: 6.6 FL (ref 5–10.5)
POTASSIUM SERPL-SCNC: 3.9 MMOL/L (ref 3.5–5.1)
PROPOXYPHENE SCREEN: NORMAL
RBC # BLD: 4.46 M/UL (ref 4–5.2)
SALICYLATE, SERUM: <0.3 MG/DL (ref 15–30)
SARS-COV-2, NAAT: NOT DETECTED
SODIUM BLD-SCNC: 138 MMOL/L (ref 136–145)
TOTAL PROTEIN: 7.8 G/DL (ref 6.4–8.2)
WBC # BLD: 7.6 K/UL (ref 4–11)

## 2022-08-21 PROCEDURE — 83036 HEMOGLOBIN GLYCOSYLATED A1C: CPT

## 2022-08-21 PROCEDURE — 85025 COMPLETE CBC W/AUTO DIFF WBC: CPT

## 2022-08-21 PROCEDURE — 93005 ELECTROCARDIOGRAM TRACING: CPT | Performed by: STUDENT IN AN ORGANIZED HEALTH CARE EDUCATION/TRAINING PROGRAM

## 2022-08-21 PROCEDURE — 1240000000 HC EMOTIONAL WELLNESS R&B

## 2022-08-21 PROCEDURE — 99223 1ST HOSP IP/OBS HIGH 75: CPT

## 2022-08-21 PROCEDURE — 80143 DRUG ASSAY ACETAMINOPHEN: CPT

## 2022-08-21 PROCEDURE — 80061 LIPID PANEL: CPT

## 2022-08-21 PROCEDURE — 82077 ASSAY SPEC XCP UR&BREATH IA: CPT

## 2022-08-21 PROCEDURE — 84443 ASSAY THYROID STIM HORMONE: CPT

## 2022-08-21 PROCEDURE — 84703 CHORIONIC GONADOTROPIN ASSAY: CPT

## 2022-08-21 PROCEDURE — 80179 DRUG ASSAY SALICYLATE: CPT

## 2022-08-21 PROCEDURE — 80178 ASSAY OF LITHIUM: CPT

## 2022-08-21 PROCEDURE — 6370000000 HC RX 637 (ALT 250 FOR IP)

## 2022-08-21 PROCEDURE — 36415 COLL VENOUS BLD VENIPUNCTURE: CPT

## 2022-08-21 PROCEDURE — 80307 DRUG TEST PRSMV CHEM ANLYZR: CPT

## 2022-08-21 PROCEDURE — 87635 SARS-COV-2 COVID-19 AMP PRB: CPT

## 2022-08-21 PROCEDURE — 80053 COMPREHEN METABOLIC PANEL: CPT

## 2022-08-21 RX ORDER — LITHIUM CARBONATE 300 MG/1
300 TABLET, FILM COATED, EXTENDED RELEASE ORAL NIGHTLY
Status: DISCONTINUED | OUTPATIENT
Start: 2022-08-21 | End: 2022-08-22

## 2022-08-21 RX ORDER — PALIPERIDONE 3 MG/1
12 TABLET, EXTENDED RELEASE ORAL NIGHTLY
Status: DISCONTINUED | OUTPATIENT
Start: 2022-08-21 | End: 2022-08-24 | Stop reason: HOSPADM

## 2022-08-21 RX ORDER — QUETIAPINE FUMARATE 200 MG/1
200 TABLET, FILM COATED ORAL NIGHTLY
Status: DISCONTINUED | OUTPATIENT
Start: 2022-08-21 | End: 2022-08-24 | Stop reason: HOSPADM

## 2022-08-21 RX ADMIN — PALIPERIDONE 12 MG: 3 TABLET, EXTENDED RELEASE ORAL at 21:55

## 2022-08-21 RX ADMIN — LITHIUM CARBONATE 300 MG: 300 TABLET, FILM COATED, EXTENDED RELEASE ORAL at 21:55

## 2022-08-21 ASSESSMENT — SLEEP AND FATIGUE QUESTIONNAIRES
DO YOU HAVE DIFFICULTY SLEEPING: NO
DO YOU USE A SLEEP AID: NO

## 2022-08-21 ASSESSMENT — PATIENT HEALTH QUESTIONNAIRE - PHQ9
1. LITTLE INTEREST OR PLEASURE IN DOING THINGS: 0
SUM OF ALL RESPONSES TO PHQ QUESTIONS 1-9: 0
2. FEELING DOWN, DEPRESSED OR HOPELESS: 0
SUM OF ALL RESPONSES TO PHQ9 QUESTIONS 1 & 2: 0

## 2022-08-21 ASSESSMENT — LIFESTYLE VARIABLES
HOW OFTEN DO YOU HAVE A DRINK CONTAINING ALCOHOL: NEVER
HOW MANY STANDARD DRINKS CONTAINING ALCOHOL DO YOU HAVE ON A TYPICAL DAY: PATIENT DECLINED

## 2022-08-21 ASSESSMENT — PAIN - FUNCTIONAL ASSESSMENT: PAIN_FUNCTIONAL_ASSESSMENT: NONE - DENIES PAIN
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
## 2022-08-21 NOTE — ED NOTES
Pt refusing continuous cardiac monitor and peripheral IV at this time.       Antoine Solano RN  08/21/22 1831

## 2022-08-21 NOTE — ED NOTES
Pt brought back to ABI already changed out into safety gown. Pt oriented to room B3 and ABI process. Pt's belongings placed in locker. Pt resting in room, no signs of distress noted.            Ce YE

## 2022-08-21 NOTE — PRE-CERTIFICATION NOTE
Writer attempted to complete pt's [employer] pre-cert. Writer called mauro at [phone] and spoke with Nevin Montes. She reported pt's insurance was through [employer] and stated to call this number [phone]. Writer called that number and was not able to get through to a representative. Writer was not able to complete pre-cert.        Benny YE

## 2022-08-21 NOTE — ED NOTES
Presenting Problem:Patient presents to [facility] ED on a SOB via [facility] and after calling [facility] to \"get help\" for their suicidal ideation. The patient reports that he was having a mental breakdown and took the bottles containing his seroquel and went for a drive to [facility] that they called [facility] with the intention that they would help prevent their suicide attempt, but decided they did not want their help and hung up the phone. Patient reports taking 20 200mg Seroquel. The patient reports that they chose [employer] towards the back to draw attention, but not too much so that their body would be found quickly after attempting to OD. Patient reports that mobile crisis called the police and that they were found in with their prescription in the car and brought to [facility]. Denies current SI, but reports that they are indifferent that they are alive vs completing their suicidal plan. Denies SI/HI/AVH at this time. Appearance/Hygiene:  well-appearing, hospital attire, lying in bed, fair grooming, and good hygiene   Motor Behavior: Patient is calmly lying in bed. Patient has purposeful non-aggressive movements. Patient has a steady even gait with no abnormalities. Attitude: cooperative  Affect: flat affect   Speech: soft  Mood: decreased range and depressed   Thought Processes: Goal directed, Obsessive, and grandiose  Perceptions: Present - Thought broadcasting reports manuel is in their head speaking to them and calling them Conor; reports VH of a Djinn ---reports that she is 80% sure this is the correct reality, but continues to fear we are in the wrong reality and that she must cross over to kill monsters and save mankind from the apocalypse. Thought content:  Patient reports that she has been having a rough few days related to her birthday being a \"trigger\" for them--patient reports that they have been drinking multiple drinks for x3 days related to triggering event.  Reports that they took the Seroquel with the intent to end their lives and are indifferent to the fact that they survived,. Orientation: A&Ox4   Memory: intact  Concentration: Good    Insight/ judgement: delusions, impaired insight/judgement      Psychosocial and contextual factors: 19yo female to male transgender patient. Patient reprots current unemployment and plan to apply for disability next week--reports mental health is too poor to hold \"normal job\". Patient lives with her fileonarda Olivo, but paul is out of town until [employer] safe in home. Denies substance abuse, but reports new frequent alcohol intake (ETOH 33 upon arrival). Reports small, but strong support system including her friends and fileonarda. Denies depression, endorses minimal anxiety--patient is very short with answers and does not elaborate further. No appetite changes, sleep 10hr/nightly. Denies any recent stressors--patient evasive. C-SSRS flowsheet is  Complete. Psychiatric History (including current outpatient provider and past inpatient admissions): Bulimia, Bipolar I, Depression, Gender dysmorphia, self-harm, schizoaffective, PTSD, borderline, psychotic disorder with delusions    Inpatient:  8/12-8/15/22 Whit Harris MD: Delusions, SI-schizoaffective  4/25-4/26/22 Whit Harris MD: SI c delusional thinking  2/22-2/24/22 Whit Harris MD: Delusions of Djinn  1/11-1/14/22 Whit Harris MD: Delusional thinking with orlin symptoms    Outpatient:  Jaida Larios MD at [facility], 1x/wk medications management. Debi Peraza for therapy 1x/wk at [facility]    Medications: Reports compliance  No current facility-administered medications on file prior to encounter.      Current Outpatient Medications on File Prior to Encounter   Medication Sig Dispense Refill    hydrOXYzine HCl (ATARAX) 50 MG tablet Take 1 tablet by mouth 2 times daily as needed for Anxiety 20 tablet 0    lithium (LITHOBID) 300 MG extended release tablet Take 1 tablet by mouth at bedtime 14 tablet 0    paliperidone (INVEGA) 6 MG extended release tablet Take 2 tablets by mouth every morning (Patient taking differently: Take 12 mg by mouth at bedtime) 30 tablet 0    QUEtiapine (SEROQUEL) 200 MG tablet Take 1 tablet by mouth at bedtime (Patient taking differently: Take 400 mg by mouth at bedtime) 60 tablet 0         Access to Firearms: Denies    ASSESSMENT FOR IMMINENT FUTURE DANGER:    RISK FACTORS:    [x]  Age <25 or >55   [x]  Male gender-transgender    [x]  Depressed mood   []  Active suicidal ideation   [x]  Suicide plan   [x]  Suicide attempt   [x]  Access to lethal means   [x]  Prior suicide attempt   [x]  Active substance abuse (alcohol)   [x]  Highly impulsive behaviors   []  Not attending to self-care/ADLs    []  Recent significant loss   []  Chronic pain or medical illness   [x]  Social isolation   []  History of violence (if yes please elaborate )   [x]  Active psychosis   []  Cognitive impairment    []  No outpatient services in place   []  Medication noncompliance   [x]  No collateral information to support safety  [x] Self- injurious/ Self-harm behavior    PROTECTIVE FACTORS:  [] Age >25 and <55  [] Female gender   [x] Denies depression  [x] Denies suicidal ideation  [] Does not have lethal plan   [x] Does not have access to guns or weapons  [x] Patient is verbally joy for safety  [] No prior suicide attempts  [] No active substance abuse  [] Patient has social or family support  [] No active psychosis or cognitive dysfunction  [] Physically healthy  [x] Has outpatient services in place  [] Compliant with recommended medications  [] Collateral information from  supports patient safety   [] Patient is future oriented with plans to            Franny Alexander RN  08/21/22 1725

## 2022-08-21 NOTE — PROGRESS NOTES
Behavioral Services  Medicare Certification Upon Admission    I certify that this patient's inpatient psychiatric hospital admission is medically necessary for:    [x] (1) Treatment which could reasonably be expected to improve this patient's condition,       [x] (2) Or for diagnostic study;     AND     [x](2) The inpatient psychiatric services are provided while the individual is under the care of a physician and are included in the individualized plan of care.     Estimated length of stay/service 2-5 days    Plan for post-hospital care outpatient services      Electronically signed by SONIA Hastings CNP on 8/21/2022 at 8:52 AM
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
## 2022-08-21 NOTE — CARE COORDINATION
08/21/22 1008   C-SSRS Suicide Screening   1) Within the past month, have you wished you were dead or wished you could go to sleep and not wake up? No  (denies, but reports most recent SA was yesterday before coming to the hospital.)   2) Have you actually had any thoughts of killing yourself? Yes   3) Have you been thinking about how you might kill yourself? No  (denies, but reports most recent SA was yesterday before coming to the hospital.)   4) Have you had these thoughts and had some intention of acting on them? No  (denies, but reports most recent SA was yesterday before coming to the hospital.)   5) Have you started to work out or worked out the details of how to kill yourself? Do you intend to carry out this plan? No  (denies, but reports most recent SA was yesterday before coming to the hospital.)   6) Have you ever done anything, started to do anything, or prepared to do anything to end your life? Yes   Did this occur within the past 3 months? Yes   Risk of Suicide High Risk   C-SSRS Risk Assessment   Suicidal and Self-Injurious Behavior  Actual suicide attempt - Past 3 months;Actual suicide attempt - Lifetime;Self-injurious behavior without suicidal intent - Lifetime; Other preparatory acts to kill self - Past 3 months; Interrupted attempt - Past 3 months   Suicidal Ideation (Most Severe in Past Month) Denies suicidal ideation  (denies, but reports most recent SA was yesterday before coming to the hospital.)   Activating Events (Recent)   (denies)   Treatment History Previous psychiatric diagnosis and treatments  ([facility], [facility], [facility])   Clinical Status (Recent) Major depression episode; Hopelessness   Protective Factors (Recent) Identifies reasons for living;Responsibility to family or others/living with family   Other Risk Factors lack of insight   Other Protective Factors \"My fiance\"   Describe any suicidal, self injurious, or aggressive behavior (include dates) Reports no recent suicidal thoughts, plans, or intent. LSW met with PT to complete CSSR assessments. Psychosocial and leisure assessments were not needed due to PT being admitted to the unit less than a month ago. PT was alert and cooperative and answered all questions for each assessment. LSW had to push for answers to questions at first and PT was contradictory at times. PT is connected to [facility] for dare management, [facility] for Counseling, and [facility] for Psychiatry in the community. PT contracted for safety while on unit.      MARLENE Celestin

## 2022-08-22 PROBLEM — R45.851 SUICIDAL INTENT: Status: ACTIVE | Noted: 2022-08-22

## 2022-08-22 LAB — TSH SERPL DL<=0.05 MIU/L-ACNC: 1.42 UIU/ML (ref 0.27–4.2)

## 2022-08-22 PROCEDURE — 99232 SBSQ HOSP IP/OBS MODERATE 35: CPT | Performed by: PSYCHIATRY & NEUROLOGY

## 2022-08-22 PROCEDURE — 6370000000 HC RX 637 (ALT 250 FOR IP)

## 2022-08-22 PROCEDURE — 6370000000 HC RX 637 (ALT 250 FOR IP): Performed by: PSYCHIATRY & NEUROLOGY

## 2022-08-22 PROCEDURE — 1240000000 HC EMOTIONAL WELLNESS R&B

## 2022-08-22 RX ORDER — TRAZODONE HYDROCHLORIDE 50 MG/1
50 TABLET ORAL NIGHTLY PRN
Status: DISCONTINUED | OUTPATIENT
Start: 2022-08-22 | End: 2022-08-24 | Stop reason: HOSPADM

## 2022-08-22 RX ORDER — MAGNESIUM HYDROXIDE/ALUMINUM HYDROXICE/SIMETHICONE 120; 1200; 1200 MG/30ML; MG/30ML; MG/30ML
30 SUSPENSION ORAL EVERY 6 HOURS PRN
Status: DISCONTINUED | OUTPATIENT
Start: 2022-08-22 | End: 2022-08-24 | Stop reason: HOSPADM

## 2022-08-22 RX ORDER — POLYETHYLENE GLYCOL 3350 17 G
2 POWDER IN PACKET (EA) ORAL
Status: DISCONTINUED | OUTPATIENT
Start: 2022-08-22 | End: 2022-08-24 | Stop reason: HOSPADM

## 2022-08-22 RX ORDER — HYDROXYZINE 50 MG/1
50 TABLET, FILM COATED ORAL 3 TIMES DAILY PRN
Status: DISCONTINUED | OUTPATIENT
Start: 2022-08-22 | End: 2022-08-24 | Stop reason: HOSPADM

## 2022-08-22 RX ORDER — DIPHENHYDRAMINE HYDROCHLORIDE 50 MG/ML
50 INJECTION INTRAMUSCULAR; INTRAVENOUS EVERY 4 HOURS PRN
Status: DISCONTINUED | OUTPATIENT
Start: 2022-08-22 | End: 2022-08-24 | Stop reason: HOSPADM

## 2022-08-22 RX ORDER — IBUPROFEN 400 MG/1
400 TABLET ORAL EVERY 6 HOURS PRN
Status: DISCONTINUED | OUTPATIENT
Start: 2022-08-22 | End: 2022-08-24 | Stop reason: HOSPADM

## 2022-08-22 RX ORDER — ACETAMINOPHEN 325 MG/1
650 TABLET ORAL EVERY 4 HOURS PRN
Status: DISCONTINUED | OUTPATIENT
Start: 2022-08-22 | End: 2022-08-24 | Stop reason: HOSPADM

## 2022-08-22 RX ORDER — OLANZAPINE 5 MG/1
5 TABLET ORAL EVERY 4 HOURS PRN
Status: DISCONTINUED | OUTPATIENT
Start: 2022-08-22 | End: 2022-08-24 | Stop reason: HOSPADM

## 2022-08-22 RX ADMIN — PALIPERIDONE 12 MG: 3 TABLET, EXTENDED RELEASE ORAL at 20:54

## 2022-08-22 RX ADMIN — QUETIAPINE FUMARATE 200 MG: 200 TABLET ORAL at 20:54

## 2022-08-22 ASSESSMENT — PAIN SCALES - GENERAL: PAINLEVEL_OUTOF10: 0

## 2022-08-22 NOTE — PLAN OF CARE
Patient sitting under the desk at times. Patient states that he feels safer. Patient denies SI/HI. Patient is delusional.   Patient believes he sees lucifer and that another patient was possessed by a wraith. Patient denied SI/HI.    Problem: Self Harm/Suicidality  Goal: Will have no self-injury during hospital stay  Description: INTERVENTIONS:  1. Q 30 MINUTES: Routine safety checks  2. Q SHIFT & PRN: Assess risk to determine if routine checks are adequate to maintain patient safety  Outcome: Progressing
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
## 2022-08-22 NOTE — PLAN OF CARE
Problem: Self Harm/Suicidality  Goal: Will have no self-injury during hospital stay  Description: INTERVENTIONS:  1. Q 30 MINUTES: Routine safety checks  2. Q SHIFT & PRN: Assess risk to determine if routine checks are adequate to maintain patient safety  Outcome: Progressing     Denies all, + meds.

## 2022-08-22 NOTE — H&P
Patient has been seen and evaluated within 30 days by IM provider and medically cleared for admission to [facility]. Please refer to medical H&P from 8/13. Vitals reviewed and stable. Labs reviewed and nothing to follow. Orders reviewed. Please contact with IM provider with concerns.     Darrel Perera PA-C  8/22/2022 8:06 AM

## 2022-08-22 NOTE — PROGRESS NOTES
Group Therapy Note    Date: 8/22/2022  Start Time: 1300  End Time:  1400  Number of Participants: 9    Type of Group: Music Group    Notes:  Pt present for Music Group. Pt actively participated by making song selections and singing along to music. Participation Level:  Active Listener and Interactive    Participation Quality: Appropriate and Attentive      Speech:  normal      Affective Functioning: Congruent      Endings: None Reported    Modes of Intervention: Support, Socialization, Activity, and Media      Discipline Responsible: Chaplain Rossy Pan       08/22/22 1548   Encounter Summary   Encounter Overview/Reason  Behavioral Health   Service Provided For: Patient   Last Encounter    (8/22 Music Group)   Complexity of Encounter Moderate   Begin Time 1300   End Time  1400   Total Time Calculated 60 min   Behavioral Health    Type  Spirituality Group

## 2022-08-22 NOTE — PROGRESS NOTES
Department of Psychiatry  AttendingProgress Note    Ruben An admits that he has been feeling more depressed lately because his birthday was coming up and it is a big trigger for him. Also he was feeling emotionally unprepared to deal with this because his girlfriend was out of town. States that he got drunk and took the overdose because he was depressed. Seroquel has been on hold since admission    The treatment team met and discussed the treatment plan. SUBJECTIVE:        Suicidal ideation:  denies suicidal ideation. Patient does not have medication side effects. ROS: Patient has new complaints: no  Sleeping adequately:  Yes   Appetite adequate: Yes  Attending groups: No:       OBJECTIVE    Physical  Vitals:    Blood pressure 108/75, pulse 83, temperature 97.7 °F (36.5 °C), temperature source Temporal, resp. rate 16, height 5' 6\" (1.676 m), weight 200 lb (90.7 kg), SpO2 99 %, not currently breastfeeding.   General appearance:  [x]  appears age, []  appears older than stated age,     []  adequately dressed and groomed, [x] disheveled,                [x]  in no acute distress, [] appears mildly distressed,      []  Other:      MUSCULOSKELETAL:   Gait:   [] normal, [] antalgic, [] unsteady, [x] in bed, gait not evaluated   Station:  [] erect, [x] sitting, [] recumbent, [] other        Strength/tone:  [x] muscle strength and tone appear consistent with age and condition     [] atrophy      [] abnormal movements  PSYCHIATRIC:    Relatedness:  [] cooperative, [x] guarded, [] indifferent, [] hostile,      [] sedated  Speech:  [x] normal prosody, [] pressured, [] decreased volume,    [] slurred, [] halting, [] slowed, [] loud     [] echolalia, [] incoherent, [] stuttering   Eye contact:  [x] direct, [] avoidant, [] intense  Kinetics:  [x] normal, [] increased, [] decreased  Mood:   [x] euthymic, [] depressed, [] anxious, [] irritable,     [] labile  Affect:   [x] normal range, [] constricted, [] depressed, [] anxious,     [] angry, [] blunted, [] flat  Hallucinations  [] denies, [x] auditory,  [x] visual,  [x] olfactory, [x] tactile  Delusions  [] none, [] grandiose,  [] jealous,  [] persecutory,  [] somatic,     [x] Does not endorse  Preoccupations  [] none, [] violence, [] obsessions, [] other     Suicidal ideation  [x] denies, [] endorses  Homicidal ideation [x] denies, [] endorses  Thought process: [x] logical, [] circumstantial, [] tangential     [] concrete, [] disorganized  Associations:  [] logical and coherent, [] loosening, [] disorganized  Insight:   [] good, [] fair, [x] poor  Judgment:  [] good, [] fair, [x] poor  Attention and concentration:     [x] intact, [] limited, [] able to focus on interview,     [] grossly impaired  Orientation:  [x] person, place, time, situation     [] disoriented to:     Memory:  Remote memory [x] intact, [] impaired     Recent memory  [x] intact, [] impaired            MFAST done.  Score 7     Data  Labs:   Admission on 08/20/2022   Component Date Value Ref Range Status    WBC 08/21/2022 7.6  4.0 - 11.0 K/uL Final    RBC 08/21/2022 4.46  4.00 - 5.20 M/uL Final    Hemoglobin 08/21/2022 13.2  12.0 - 16.0 g/dL Final    Hematocrit 08/21/2022 38.2  36.0 - 48.0 % Final    MCV 08/21/2022 85.6  80.0 - 100.0 fL Final    MCH 08/21/2022 29.6  26.0 - 34.0 pg Final    MCHC 08/21/2022 34.6  31.0 - 36.0 g/dL Final    RDW 08/21/2022 13.0  12.4 - 15.4 % Final    Platelets 29/57/8943 356  135 - 450 K/uL Final    MPV 08/21/2022 6.6  5.0 - 10.5 fL Final    Neutrophils % 08/21/2022 66.4  % Final    Lymphocytes % 08/21/2022 23.0  % Final    Monocytes % 08/21/2022 6.9  % Final    Eosinophils % 08/21/2022 2.4  % Final    Basophils % 08/21/2022 1.3  % Final    Neutrophils Absolute 08/21/2022 5.1  1.7 - 7.7 K/uL Final    Lymphocytes Absolute 08/21/2022 1.8  1.0 - 5.1 K/uL Final    Monocytes Absolute 08/21/2022 0.5  0.0 - 1.3 K/uL Final    Eosinophils Absolute 08/21/2022 0.2  0.0 - 0.6 K/uL Final Basophils Absolute 08/21/2022 0.1  0.0 - 0.2 K/uL Final    Sodium 08/21/2022 138  136 - 145 mmol/L Final    Potassium 08/21/2022 3.9  3.5 - 5.1 mmol/L Final    Chloride 08/21/2022 101  99 - 110 mmol/L Final    CO2 08/21/2022 21  21 - 32 mmol/L Final    Anion Gap 08/21/2022 16  3 - 16 Final    Glucose 08/21/2022 140 (A) 70 - 99 mg/dL Final    BUN 08/21/2022 10  7 - 20 mg/dL Final    Creatinine 08/21/2022 0.6  0.6 - 1.1 mg/dL Final    GFR Non- 08/21/2022 >60  >60 Final    Comment: >60 mL/min/1.73m2 EGFR, calc. for ages 25 and older using the  MDRD formula (not corrected for weight), is valid for stable  renal function. GFR  08/21/2022 >60  >60 Final    Comment: Chronic Kidney Disease: less than 60 ml/min/1.73 sq.m. Kidney Failure: less than 15 ml/min/1.73 sq.m. Results valid for patients 18 years and older.       Calcium 08/21/2022 9.2  8.3 - 10.6 mg/dL Final    Total Protein 08/21/2022 7.8  6.4 - 8.2 g/dL Final    Albumin 08/21/2022 4.9  3.4 - 5.0 g/dL Final    Albumin/Globulin Ratio 08/21/2022 1.7  1.1 - 2.2 Final    Total Bilirubin 08/21/2022 <0.2  0.0 - 1.0 mg/dL Final    Alkaline Phosphatase 08/21/2022 86  40 - 129 U/L Final    ALT 08/21/2022 14  10 - 40 U/L Final    AST 08/21/2022 13 (A) 15 - 37 U/L Final    hCG Qual 08/21/2022 Negative  Detects HCG level >10 MIU/mL Final    Ethanol Lvl 08/21/2022 33  mg/dL Final    Comment:    None Detected  Conversion factor:  100 mg/dl = .100 g/dl  For Medical Purposes Only      Amphetamine Screen, Urine 08/21/2022 Neg  Negative <1000ng/mL Final    Barbiturate Screen, Ur 08/21/2022 Neg  Negative <200 ng/mL Final    Benzodiazepine Screen, Urine 08/21/2022 Neg  Negative <200 ng/mL Final    Cannabinoid Scrn, Ur 08/21/2022 Neg  Negative <50 ng/mL Final    Cocaine Metabolite Screen, Urine 08/21/2022 Neg  Negative <300 ng/mL Final    Opiate Scrn, Ur 08/21/2022 Neg  Negative <300 ng/mL Final    Comment: \"Therapeutic levels of pain Negative results should be treated as presumptive and,  if inconsistent with clinical signs and symptoms or necessary for  patient management, should be tested with an alternative molecular  assay. Negative results do not preclude SARS-CoV-2 infection and  should not be used as the sole basis for patient management decisions. This test has been authorized by the FDA under an Emergency Use  Authorization (EUA) for use by authorized laboratories. Fact sheet for Healthcare Providers:  [employer].es  Fact sheet for Patients: [employer]es    METHODOLOGY: Isothermal Nucleic Acid Amplification      Ventricular Rate 08/21/2022 105  BPM Final    Atrial Rate 08/21/2022 105  BPM Final    P-R Interval 08/21/2022 130  ms Final    QRS Duration 08/21/2022 84  ms Final    Q-T Interval 08/21/2022 324  ms Final    QTc Calculation (Bazett) 08/21/2022 428  ms Final    P Axis 08/21/2022 26  degrees Final    R Axis 08/21/2022 38  degrees Final    T Axis 08/21/2022 28  degrees Final    Diagnosis 08/21/2022 Sinus tachycardiaNonspecific T wave abnormalityAbnormal ECGWhen compared with ECG of 21-AUG-2022 00:44,Nonspecific T wave abnormality now evident in Anterolateral leadsConfirmed by Dion Mcelroy ([id number]) on 8/21/2022 5:01:44 PM   Final            Medications  Current Facility-Administered Medications: paliperidone (INVEGA) extended release tablet 12 mg, 12 mg, Oral, Nightly  [Held by provider] QUEtiapine (SEROQUEL) tablet 200 mg, 200 mg, Oral, Nightly  lithium (LITHOBID) extended release tablet 300 mg, 300 mg, Oral, Nightly    ASSESSMENT AND PLAN    Principal Problem:    Schizoaffective disorder, bipolar type ([facility])  Active Problems:    Female-to-male transgender person    PTSD (post-traumatic stress disorder)    Borderline personality disorder ([facility] [address]  Resolved Problems:    * No resolved hospital problems. *       1. Patient's symptoms show no change  2. Probable discharge is

## 2022-08-22 NOTE — PROGRESS NOTES
Patient withdrawn and staying in his room. Patient calm and cooperative. Patient hiding under the desk at times. Patient denied all and states that he does feel safe.

## 2022-08-23 PROBLEM — Z76.5 MALINGERING: Status: ACTIVE | Noted: 2022-08-23

## 2022-08-23 LAB
CHOLESTEROL, TOTAL: 121 MG/DL (ref 0–199)
HDLC SERPL-MCNC: 41 MG/DL (ref 40–60)
LDL CHOLESTEROL CALCULATED: 53 MG/DL
TRIGL SERPL-MCNC: 133 MG/DL (ref 0–150)
VLDLC SERPL CALC-MCNC: 27 MG/DL

## 2022-08-23 PROCEDURE — 6370000000 HC RX 637 (ALT 250 FOR IP)

## 2022-08-23 PROCEDURE — 1240000000 HC EMOTIONAL WELLNESS R&B

## 2022-08-23 PROCEDURE — 99232 SBSQ HOSP IP/OBS MODERATE 35: CPT | Performed by: PSYCHIATRY & NEUROLOGY

## 2022-08-23 RX ADMIN — PALIPERIDONE 12 MG: 3 TABLET, EXTENDED RELEASE ORAL at 20:07

## 2022-08-23 RX ADMIN — QUETIAPINE FUMARATE 200 MG: 200 TABLET ORAL at 20:07
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
## 2022-08-23 NOTE — FLOWSHEET NOTE
Problem: Self Harm/Suicidality  Goal: Will have no self-injury during hospital stay  Outcome: Progressing     Problem: Anxiety  Goal: Will report anxiety at manageable levels  Outcome: Progressing     Problem: Coping  Goal: Pt/Family able to verbalize concerns and demonstrate effective coping strategies  Outcome: Progressing                  08/23/22 1434   Mental Status and Behavioral Exam   Normal No   Level of Assistance Independent/Self   Facial Expression Flat   Affect Incongruent   Level of Consciousness Alert   Frequency of Checks 4 times per hour, close   Mood:Normal No   Mood Depressed; Ambivalent   Motor Activity:Normal Yes   Motor Activity Agitated;Repetitive acts   Eye Contact Good   Observed Behavior Cooperative;Guarded   Sexual Misconduct History Current - no   Preception [place] to person;Republic to time   Attention:Normal Yes   Thought Processes Blocking   Thought Content:Normal No   Depression Symptoms Sleep disturbance   Anxiety Symptoms Generalized   Janelle Symptoms Poor judgment   Hallucinations Auditory (comment)   Delusions Yes   Delusions Paranoid   Memory:Normal No   Memory Confabulation   Insight and Judgment No   Insight and Judgment Poor judgment;Poor insight   Nirmala Piedra  has not been engaged in treatment AEB Refusal to participate with groups. Patient presents with a incongruent affect and Slightly ambivalent mood. Nirmala Piedra is dressed in street clothes and appears unkempt  AEB greasy hair stained clothes. Patient has been medication compliant as there are no med they receive on the day shift and has not verbalized any concerns regarding his medications or safety. Patient's thoughts are blocking AEB one word answers at most 2 very short words like \"Im fine,\" or \"I do \". Writer has observed any unsafe behavior or change in behavior for patient. The behavior is a coping technique for the patient which is to climb under desk and curl up with blanket to feel safe.  When asked if she does at home they said \"I do\" They were then asked do you go to your closet they again said \" I do\". They have a very bizarre almost smirk on their face at all times. It is impossible to tell if he is exhibiting these as a attention seeking behavior or if this is legitimate incongruentcy    Patient does deny suicidal ideation, plan, or intent and does deny homicidal thoughts, does appear distracted or as though he is responding to internal stimuli. Patient does not deny hallucinations. Will continue to monitor for safety and changes.      Taylor White RN RN,BSN 8/23/2022 2:48 PM

## 2022-08-23 NOTE — PLAN OF CARE
Problem: Self Harm/Suicidality  Goal: Will have no self-injury during hospital stay  Outcome: Progressing     Problem: Anxiety  Goal: Will report anxiety at manageable levels  Outcome: Progressing     Problem: Coping  Goal: Pt/Family able to verbalize concerns and demonstrate effective coping strategies  Outcome: Progressing

## 2022-08-24 VITALS
TEMPERATURE: 98.2 F | BODY MASS INDEX: 32.14 KG/M2 | RESPIRATION RATE: 16 BRPM | OXYGEN SATURATION: 100 % | SYSTOLIC BLOOD PRESSURE: 125 MMHG | DIASTOLIC BLOOD PRESSURE: 71 MMHG | WEIGHT: 200 LBS | HEIGHT: 66 IN | HEART RATE: 84 BPM

## 2022-08-24 PROBLEM — R45.851 SUICIDAL INTENT: Status: RESOLVED | Noted: 2022-08-22 | Resolved: 2022-08-24

## 2022-08-24 LAB
ESTIMATED AVERAGE GLUCOSE: 111.2 MG/DL
HBA1C MFR BLD: 5.5 %

## 2022-08-24 PROCEDURE — 5130000000 HC BRIDGE APPOINTMENT

## 2022-08-24 PROCEDURE — 99239 HOSP IP/OBS DSCHRG MGMT >30: CPT | Performed by: PSYCHIATRY & NEUROLOGY
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
## 2022-08-24 NOTE — PLAN OF CARE
Problem: Anxiety  Goal: Will report anxiety at manageable levels  Description: INTERVENTIONS:  1. Administer medication as ordered  2. Teach and rehearse alternative coping skills  3. Provide emotional support with 1:1 interaction with staff  8/23/2022 2111 by Heri Berry RN  Outcome: Not Progressing  8/23/2022 1425 by Peggy Landau, RN  Outcome: Progressing     Problem: Coping  Goal: Pt/Family able to verbalize concerns and demonstrate effective coping strategies  Description: INTERVENTIONS:  1. Assist patient/family to identify coping skills, available support systems and cultural and spiritual values  2. Provide emotional support, including active listening and acknowledgement of concerns of patient and caregivers  3. Reduce environmental stimuli, as able  4. Instruct patient/family in relaxation techniques, as appropriate  5. Assess for spiritual pain/suffering and initiate Spiritual Care, Psychosocial Clinical Specialist consults as needed  8/23/2022 2111 by Heri Berry RN  Outcome: Not Progressing  8/23/2022 1425 by Peggy Landau, RN  Outcome: Progressing     Problem: Depression  Goal: Will be euthymic at discharge  Description: INTERVENTIONS:  1. Administer medication as ordered  2. Provide emotional support via 1:1 interaction with staff  3. Encourage involvement in milieu/groups/activities  4. Monitor for social isolation  Outcome: Not Progressing   Pt remains isolative to room. Was not sitting under desk this time at start of shift. Flat, blunt, smiles and laughs inappropriately at times while talking. Denies all. CFS. Endorses continuation of [facility]. States \"Lucifer is standing behind me right now, telling me things\", but not telling him to harm self or others. Still refusing to come out of room due to visions and \"wraiths\" on the unit. Compliant with evening meds.

## 2022-08-24 NOTE — DISCHARGE INSTRUCTIONS
Advanced Directives:  Patient does not have a surrogate decision maker appointed   Name (if yes): N/A Phone Number: N/A  Patient does not have a psychiatric and/ or medical advanced directive or power of . Patient was offered psychiatric and/ or medical advanced directive or power of  information/completion but declined to complete   Why not? N/A    Discharge Planning is Complete. Discharge Date: 08/24/2022  Reason for Hospitalization: Suicide Ideation  Discharge Diagnosis: Schizoaffective Disorder Bipolar, Mood Disorder. Discharging to: Personal Domicile    Your instructions: Your physician here was Boris Blake, *. If you have any questions please call the unit at [phone] for the adult unit or [phone] for [facility]. Please note that we have a patient family advisory [place] that meets the second Wednesday of January and the second Wednesday of July at [address] [facility] in [place] at [facility]. Department leadership would love for you to attend to give feedback on what we are doing well and areas in which we can improve our patient care. Please come if you are able and feel free to reach out to [facility] at [phone] with any questions. The crisis number for [facility] is [phone] (SAVE). This crisis line is available 24 hours a day, seven days a week. Please follow up with your PCP regarding any pending labs. Your next appointment is:  Name of Provider: Kael King  Provider specialty/license: Family Medicine  Date and time of appointment: 09/09/2022 @ 2:00 pm   The type/s of services requested are: Hospital Discharge Follow-up  Agency name: [facility]  Address: [address] [place] [address]  Phone Number: [phone]  Special instructions (what to bring to appointment, etc.): Please bring photo ID, insurance card, and any copays.  Please arrive 15 minutes early to your appointment. If you need to reschedule or cancel, please call the number provided above at least 24-hours before your scheduled appointment time. Other appointments:   Name of Provider: Cecilio Brittle  Provider specialty/license: CASSI  Date and time of appointment: 08/29/2022 @ 10 am   The type/s of services requested are: Mental Health Counseling  Agency name: [facility]previously called [facility])  Address: Telehealth   Phone Number: [phone]  Special instructions (what to bring to appointment, etc.): This appointment will be completed via telehealth. You informed [facility] Staff that you have had a long-standing running appointments with [facility] and are aware of the telehealth process. For additional information, or to reschedule or cancel your appointment, please call [facility] at the appointment provided above. [facility] staff offered to reschedule your missed psychiatry appointment with Dr. Didier Bell at [address] [place] [address]. You informed staff that you will reach out to [facility] to schedule an appointment once you leave the hospital. [facility] staff recommends that you call your provider within 24-hours of discharge to schedule an appointment for within the next 7-days. Discharge Completed By: CASSI Patino and Cecilia Scales  Fax to: Follow up provider name and number  [facility]) PCP - Discharge transmitted via EHR  [facility] @ [phone]    The following personal items were collected during your admission and were returned to you:    Belongings  Dental Appliances: None  Vision - Corrective Lenses: Eyeglasses  Hearing Aid: None  Clothing:  Footwear, Shorts, Shirt  Jewelry: None  Body Piercings Removed: N/A  Electronic Devices: Cell Phone (phone in safe)  Weapons (Notify Protective Services/Security): None  Other Valuables: Vonnie Ramírez (Comment) (2 debit cards in safe)  Home Medications: None  Valuables Given To: Patient, Uma Walsh (safe)  Provide Name(s) of Who Valuable(s) Were Given To: patient, locker, safe    By signing below, I understand and acknowledge receipt of the instructions indicated above.

## 2022-08-24 NOTE — BH NOTE
[facility]  Discharge Note    Pt discharged with followings belongings:   Dental Appliances: None  Vision - Corrective Lenses: Eyeglasses  Hearing Aid: None  Jewelry: None  Body Piercings Removed: N/A  Clothing: Footwear, Shorts, Shirt  Other Valuables: Obdulio Quails, Other (Comment) (2 debit cards in safe)   Valuables returned to patient. Patient education on aftercare instructions: yes  Information faxed to [facility] @ [phone] by LR RN  at 2:20 PM .Patient verbalize understanding of AVS:  yes. Status EXAM upon discharge:  Mental Status and Behavioral Exam  Normal: No  Level of Assistance: Independent/Self  Facial Expression: Flat  Affect: Stable  Level of Consciousness: Alert  Frequency of Checks: 4 times per hour, close  Mood:Normal: No  Mood: Anxious, Depressed  Motor Activity:Normal: Yes  Motor Activity: Agitated, Repetitive acts  Eye Contact: Good  Observed Behavior: Cooperative  Sexual Misconduct History: Current - no  Involved In Any Sexual Misconduct With Others? : No  History of Sexually Inappropriate Behavior When Previously Hospitalized?: No  Difficulty Controlling Sexual Impulses?: No  Preception: [place] to person, [place] to time, [place] to place, [place] to situation  Attention:Normal: Yes  Thought Processes: Blocking  Thought Content:Normal: Yes  Thought Content: Paranoia, Preoccupations, Delusions  Depression Symptoms: Change in energy level  Anxiety Symptoms: Generalized  Janelle Symptoms: No problems reported or observed.   Hallucinations: None  Delusions: No  Delusions: Paranoid, Persecutory  Memory:Normal: Yes  Memory: Confabulation  Insight and Judgment: No  Insight and Judgment: Poor judgment    Tobacco Screening:  Practical Counseling, on admission, diane X, if applicable and completed (first 3 are required if patient doesn't refuse):            ( ) Recognizing danger situations (included triggers and roadblocks)                    ( ) Coping skills (new ways to manage stress,relaxation techniques, changing routine, distraction)                                                           ( ) Basic information about quitting (benefits of quitting, techniques in how to quit, available resources  ( ) Referral for counseling faxed to [facility]                                                                                                                   ( ) Patient refused counseling  ( ) Patient refused referral  ( ) Patient refused prescription upon discharge  ( x) Patient has not smoked in the last 30 days    Metabolic Screening:    Lab Results   Component Value Date    LABA1C 5.5 08/21/2022       Lab Results   Component Value Date    CHOL 121 08/21/2022    CHOL 141 08/13/2022    CHOL 140 04/25/2022    CHOL 156 02/22/2022    CHOL 111 06/08/2021     Lab Results   Component Value Date    TRIG 133 08/21/2022    TRIG 104 08/13/2022    TRIG 106 04/25/2022    TRIG 49 02/22/2022    TRIG 77 06/08/2021     Lab Results   Component Value Date    HDL 41 08/21/2022    HDL 43 08/13/2022    HDL 46 04/25/2022    HDL 68 (H) 02/22/2022    HDL 35 (L) 06/08/2021     No components found for: [facility] [place]  Lab Results   Component Value Date    LABVLDL 27 08/21/2022    LABVLDL 21 08/13/2022    LABVLDL 21 04/25/2022    LABVLDL 10 02/22/2022    LABVLDL 15 06/08/2021       Bridge Appointment completed: Reviewed Discharge Instructions with patient. Patient verbalizes understanding and agreement with the discharge plan using the teachback method.        Vaccinations (diane X if applicable and completed):  ( ) Patient states already received influenza vaccine elsewhere  ( ) Patient received influenza vaccine during this hospitalization  (x ) Patient refused influenza vaccine at this time

## 2022-08-24 NOTE — PROGRESS NOTES
Group Therapy Note    Date: 8/23/2022  Start Time: 20:00  End Time:  21:00  Number of Participants: 10    Type of Group: Recreational   wrap up    Wellness Binder Information  Module Name:  /  Session Number:  /    Patient's Goal:  coping skills    Notes:  continuing to work on goal    Status After Intervention:  Unchanged    Participation Level:  Active Listener and Interactive    Participation Quality: Appropriate and Attentive      Speech:  pressured      Thought Process/Content: Logical      Affective Functioning: Blunted      Mood: anxious and depressed      Level of consciousness:  Alert and Attentive      Response to Learning: Able to change behavior      Endings: None Reported    Modes of Intervention: Socialization and Problem-solving      Discipline Responsible: [address] [place]      Signature:  Kristan Ybarra

## 2022-08-29 ENCOUNTER — HOSPITAL ENCOUNTER (EMERGENCY)
Age: 22
Discharge: HOME | End: 2022-08-29
Payer: COMMERCIAL

## 2022-08-29 ENCOUNTER — APPOINTMENT (OUTPATIENT)
Dept: GENERAL RADIOLOGY | Age: 22
End: 2022-08-29
Payer: COMMERCIAL

## 2022-08-29 VITALS
DIASTOLIC BLOOD PRESSURE: 59 MMHG | HEART RATE: 62 BPM | WEIGHT: 200 LBS | SYSTOLIC BLOOD PRESSURE: 105 MMHG | BODY MASS INDEX: 32.14 KG/M2 | RESPIRATION RATE: 16 BRPM | TEMPERATURE: 98.4 F | HEIGHT: 66 IN | OXYGEN SATURATION: 99 %

## 2022-08-29 DIAGNOSIS — R55 SYNCOPE AND COLLAPSE: Primary | ICD-10-CM

## 2022-08-29 LAB
A/G RATIO: 2 (ref 1.1–2.2)
ALBUMIN SERPL-MCNC: 5 G/DL (ref 3.4–5)
ALP BLD-CCNC: 88 U/L (ref 40–129)
ALT SERPL-CCNC: 20 U/L (ref 10–40)
ANION GAP SERPL CALCULATED.3IONS-SCNC: 11 MMOL/L (ref 3–16)
AST SERPL-CCNC: 15 U/L (ref 15–37)
BASOPHILS ABSOLUTE: 0.1 K/UL (ref 0–0.2)
BASOPHILS RELATIVE PERCENT: 0.7 %
BILIRUB SERPL-MCNC: 0.3 MG/DL (ref 0–1)
BUN BLDV-MCNC: 8 MG/DL (ref 7–20)
CALCIUM SERPL-MCNC: 10.3 MG/DL (ref 8.3–10.6)
CHLORIDE BLD-SCNC: 98 MMOL/L (ref 99–110)
CO2: 26 MMOL/L (ref 21–32)
CREAT SERPL-MCNC: 0.7 MG/DL (ref 0.6–1.1)
EOSINOPHILS ABSOLUTE: 0.1 K/UL (ref 0–0.6)
EOSINOPHILS RELATIVE PERCENT: 1.2 %
GFR AFRICAN AMERICAN: >60
GFR NON-AFRICAN AMERICAN: >60
GLUCOSE BLD-MCNC: 143 MG/DL (ref 70–99)
HCG(URINE) PREGNANCY TEST: NEGATIVE
HCT VFR BLD CALC: 38.7 % (ref 36–48)
HEMOGLOBIN: 13 G/DL (ref 12–16)
LYMPHOCYTES ABSOLUTE: 1.9 K/UL (ref 1–5.1)
LYMPHOCYTES RELATIVE PERCENT: 20 %
MCH RBC QN AUTO: 29.6 PG (ref 26–34)
MCHC RBC AUTO-ENTMCNC: 33.6 G/DL (ref 31–36)
MCV RBC AUTO: 88 FL (ref 80–100)
MONOCYTES ABSOLUTE: 0.5 K/UL (ref 0–1.3)
MONOCYTES RELATIVE PERCENT: 5.3 %
NEUTROPHILS ABSOLUTE: 7.1 K/UL (ref 1.7–7.7)
NEUTROPHILS RELATIVE PERCENT: 72.8 %
PDW BLD-RTO: 13.1 % (ref 12.4–15.4)
PLATELET # BLD: 369 K/UL (ref 135–450)
PMV BLD AUTO: 6.6 FL (ref 5–10.5)
POTASSIUM SERPL-SCNC: 4.3 MMOL/L (ref 3.5–5.1)
RBC # BLD: 4.4 M/UL (ref 4–5.2)
SODIUM BLD-SCNC: 135 MMOL/L (ref 136–145)
TOTAL PROTEIN: 7.5 G/DL (ref 6.4–8.2)
TROPONIN: <0.01 NG/ML
WBC # BLD: 9.7 K/UL (ref 4–11)

## 2022-08-29 PROCEDURE — 71045 X-RAY EXAM CHEST 1 VIEW: CPT

## 2022-08-29 PROCEDURE — 99285 EMERGENCY DEPT VISIT HI MDM: CPT

## 2022-08-29 PROCEDURE — 84484 ASSAY OF TROPONIN QUANT: CPT

## 2022-08-29 PROCEDURE — 84703 CHORIONIC GONADOTROPIN ASSAY: CPT

## 2022-08-29 PROCEDURE — 80053 COMPREHEN METABOLIC PANEL: CPT

## 2022-08-29 PROCEDURE — 85025 COMPLETE CBC W/AUTO DIFF WBC: CPT

## 2022-08-29 PROCEDURE — 93005 ELECTROCARDIOGRAM TRACING: CPT | Performed by: NURSE PRACTITIONER

## 2022-08-29 ASSESSMENT — PAIN - FUNCTIONAL ASSESSMENT
PAIN_FUNCTIONAL_ASSESSMENT: NONE - DENIES PAIN
PAIN_FUNCTIONAL_ASSESSMENT: NONE - DENIES PAIN

## 2022-08-30 ENCOUNTER — TELEPHONE (OUTPATIENT)
Dept: FAMILY MEDICINE CLINIC | Age: 22
End: 2022-08-30

## 2022-08-30 ENCOUNTER — NURSE TRIAGE (OUTPATIENT)
Dept: OTHER | Facility: CLINIC | Age: 22
End: 2022-08-30

## 2022-08-30 LAB
EKG ATRIAL RATE: 54 BPM
EKG DIAGNOSIS: NORMAL
EKG P AXIS: 21 DEGREES
EKG P-R INTERVAL: 172 MS
EKG Q-T INTERVAL: 424 MS
EKG QRS DURATION: 84 MS
EKG QTC CALCULATION (BAZETT): 402 MS
EKG R AXIS: 53 DEGREES
EKG T AXIS: 41 DEGREES
EKG VENTRICULAR RATE: 54 BPM

## 2022-08-30 PROCEDURE — 93010 ELECTROCARDIOGRAM REPORT: CPT | Performed by: INTERNAL MEDICINE

## 2022-08-30 NOTE — ED PROVIDER NOTES
I did not participate in the care of this patient.  I did interpret the 12-lead EKG as follows:    Sinus bradycardia rate of 54 bpm, [place] interval QRS QTC normal.  Normal axis no acute ischemic findings improvement compared to prior EKG     Richar Cabral MD  08/30/22 9826

## 2022-08-30 NOTE — ED NOTES
Pt instructed to follow up with PCP. Assessed per Brian Pittman NP.      Van Gauthier LPN  07/50/21 0731

## 2022-08-30 NOTE — TELEPHONE ENCOUNTER
Received call from [facility] with [employer] Complaint. Subjective: Caller states \"I passed out yesterday and I feel worse today. \"     Current Symptoms: dizziness, lightheaded, nausea    Onset: 1 day ago; worsening    Associated Symptoms: N/A    Pain Severity: 0/10; N/A; none    Temperature: Denies    What has been tried: History of diabetes    Recommended disposition: See in Office Today    Care advice provided, patient verbalizes understanding; denies any other questions or concerns; instructed to call back for any new or worsening symptoms. Patient/Caller agrees with recommended disposition; writer provided warm transfer to [facility] at [facility] for appointment scheduling     Attention Provider: Thank you for allowing me to participate in the care of your patient. The patient was connected to triage in response to information provided to the ECC/PSC. Please do not respond through this encounter as the response is not directed to a shared pool.     Reason for Disposition   All other patients, and now alert and feels fine  (Exception: SIMPLE FAINT due to stress, pain, prolonged standing, or suddenly standing.)    Protocols used: Fainting-ADULT-[place]

## 2022-08-31 ENCOUNTER — NURSE TRIAGE (OUTPATIENT)
Dept: OTHER | Facility: CLINIC | Age: 22
End: 2022-08-31

## 2022-08-31 ENCOUNTER — HOSPITAL ENCOUNTER (EMERGENCY)
Age: 22
Discharge: HOME | End: 2022-08-31
Payer: COMMERCIAL

## 2022-08-31 VITALS
HEART RATE: 55 BPM | DIASTOLIC BLOOD PRESSURE: 74 MMHG | BODY MASS INDEX: 32.14 KG/M2 | WEIGHT: 200 LBS | TEMPERATURE: 98.3 F | SYSTOLIC BLOOD PRESSURE: 132 MMHG | OXYGEN SATURATION: 100 % | HEIGHT: 66 IN | RESPIRATION RATE: 20 BRPM

## 2022-08-31 DIAGNOSIS — R42 LIGHTHEADEDNESS: Primary | ICD-10-CM

## 2022-08-31 LAB
A/G RATIO: 1.7 (ref 1.1–2.2)
ALBUMIN SERPL-MCNC: 5.2 G/DL (ref 3.4–5)
ALP BLD-CCNC: 100 U/L (ref 40–129)
ALT SERPL-CCNC: 23 U/L (ref 10–40)
ANION GAP SERPL CALCULATED.3IONS-SCNC: 11 MMOL/L (ref 3–16)
AST SERPL-CCNC: 20 U/L (ref 15–37)
BASOPHILS ABSOLUTE: 0.1 K/UL (ref 0–0.2)
BASOPHILS RELATIVE PERCENT: 0.8 %
BILIRUB SERPL-MCNC: <0.2 MG/DL (ref 0–1)
BUN BLDV-MCNC: 9 MG/DL (ref 7–20)
CALCIUM SERPL-MCNC: 9.4 MG/DL (ref 8.3–10.6)
CHLORIDE BLD-SCNC: 99 MMOL/L (ref 99–110)
CO2: 27 MMOL/L (ref 21–32)
CREAT SERPL-MCNC: 0.7 MG/DL (ref 0.6–1.1)
EOSINOPHILS ABSOLUTE: 0.3 K/UL (ref 0–0.6)
EOSINOPHILS RELATIVE PERCENT: 3.9 %
GFR AFRICAN AMERICAN: >60
GFR NON-AFRICAN AMERICAN: >60
GLUCOSE BLD-MCNC: 199 MG/DL (ref 70–99)
HCT VFR BLD CALC: 40.5 % (ref 36–48)
HEMOGLOBIN: 13.5 G/DL (ref 12–16)
LYMPHOCYTES ABSOLUTE: 2.2 K/UL (ref 1–5.1)
LYMPHOCYTES RELATIVE PERCENT: 26.4 %
MAGNESIUM: 1.9 MG/DL (ref 1.8–2.4)
MCH RBC QN AUTO: 29.3 PG (ref 26–34)
MCHC RBC AUTO-ENTMCNC: 33.4 G/DL (ref 31–36)
MCV RBC AUTO: 87.8 FL (ref 80–100)
MONOCYTES ABSOLUTE: 0.7 K/UL (ref 0–1.3)
MONOCYTES RELATIVE PERCENT: 8.8 %
NEUTROPHILS ABSOLUTE: 5.1 K/UL (ref 1.7–7.7)
NEUTROPHILS RELATIVE PERCENT: 60.1 %
PDW BLD-RTO: 13 % (ref 12.4–15.4)
PLATELET # BLD: 441 K/UL (ref 135–450)
PMV BLD AUTO: 7.2 FL (ref 5–10.5)
POTASSIUM SERPL-SCNC: 5.4 MMOL/L (ref 3.5–5.1)
RBC # BLD: 4.61 M/UL (ref 4–5.2)
SODIUM BLD-SCNC: 137 MMOL/L (ref 136–145)
TOTAL PROTEIN: 8.3 G/DL (ref 6.4–8.2)
TROPONIN: <0.01 NG/ML
WBC # BLD: 8.4 K/UL (ref 4–11)

## 2022-08-31 PROCEDURE — 85025 COMPLETE CBC W/AUTO DIFF WBC: CPT

## 2022-08-31 PROCEDURE — 93005 ELECTROCARDIOGRAM TRACING: CPT | Performed by: NURSE PRACTITIONER

## 2022-08-31 PROCEDURE — 99284 EMERGENCY DEPT VISIT MOD MDM: CPT

## 2022-08-31 PROCEDURE — 83735 ASSAY OF MAGNESIUM: CPT

## 2022-08-31 PROCEDURE — 80053 COMPREHEN METABOLIC PANEL: CPT

## 2022-08-31 PROCEDURE — 84484 ASSAY OF TROPONIN QUANT: CPT

## 2022-08-31 RX ORDER — 0.9 % SODIUM CHLORIDE 0.9 %
1000 INTRAVENOUS SOLUTION INTRAVENOUS ONCE
Status: DISCONTINUED | OUTPATIENT
Start: 2022-08-31 | End: 2022-08-31 | Stop reason: HOSPADM

## 2022-08-31 ASSESSMENT — PAIN - FUNCTIONAL ASSESSMENT
PAIN_FUNCTIONAL_ASSESSMENT: NONE - DENIES PAIN
PAIN_FUNCTIONAL_ASSESSMENT: NONE - DENIES PAIN

## 2022-08-31 NOTE — DISCHARGE INSTRUCTIONS
Labs look good here today  No orthostatic hypotension  Follow-up with PCP for further work-up as we discussed
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
## 2022-08-31 NOTE — TELEPHONE ENCOUNTER
Received call from [facility] at [facility] with [employer] Complaint. Subjective: Caller states \"I am not feeling any better. I am feeling worse. When I stand up my hearing goes out. If I stand up too long I get floaters in my vision and it goes out. They told me it was orthostatic hypotension. I have been drinking Gatorade and hasn't been helping. \"     Current Symptoms: weakness and dizziness when sitting. \"About to pass out\" sensation. When standing hearing and vision changes \"go out\" last night did have a syncopal episode \"maybe a minute\" no episodes today. States diabetic but has been eating regularly. Onset: couple days ago; worsening    Associated Symptoms: nausea without emesis. States pulse has been lower than baseline, states dispatch said BP was lower than his baseline. Pain Severity: 0/10; ; chest pain last night, none since. Denies headache or eye pain. Temperature: denies     What has been tried: [facility] yesterday told pt to drink water when s/s occur which has not helped. LMP: NA Pregnant: NA    Recommended disposition: Go to ED/UCC Now (Or to Office with PCP Approval)    Care advice provided, patient verbalizes understanding; denies any other questions or concerns; instructed to call back for any new or worsening symptoms. Writer provided warm transfer to [place] at [facility] for 2nd level triage regarding persistent dizziness, near syncopal sensation. Has been treated in past for same s/s without resolution      Attention Provider: Thank you for allowing me to participate in the care of your patient. The patient was connected to triage in response to information provided to the ECC/PSC. Please do not respond through this encounter as the response is not directed to a shared pool.           Reason for Disposition   SEVERE dizziness (e.g., unable to stand, requires support to walk, feels like passing out now)    Protocols used: Dizziness-ADULT-[place]

## 2022-09-01 LAB
EKG ATRIAL RATE: 53 BPM
EKG DIAGNOSIS: NORMAL
EKG P AXIS: 38 DEGREES
EKG P-R INTERVAL: 172 MS
EKG Q-T INTERVAL: 396 MS
EKG QRS DURATION: 72 MS
EKG QTC CALCULATION (BAZETT): 371 MS
EKG R AXIS: 38 DEGREES
EKG T AXIS: 33 DEGREES
EKG VENTRICULAR RATE: 53 BPM

## 2022-09-01 PROCEDURE — 93010 ELECTROCARDIOGRAM REPORT: CPT | Performed by: INTERNAL MEDICINE

## 2022-09-02 ENCOUNTER — OFFICE VISIT (OUTPATIENT)
Dept: FAMILY MEDICINE CLINIC | Age: 22
End: 2022-09-02
Payer: COMMERCIAL

## 2022-09-02 VITALS
OXYGEN SATURATION: 98 % | RESPIRATION RATE: 16 BRPM | HEART RATE: 100 BPM | TEMPERATURE: 97.6 F | DIASTOLIC BLOOD PRESSURE: 62 MMHG | WEIGHT: 246 LBS | BODY MASS INDEX: 39.71 KG/M2 | SYSTOLIC BLOOD PRESSURE: 102 MMHG

## 2022-09-02 DIAGNOSIS — I95.1 ORTHOSTASIS: ICD-10-CM

## 2022-09-02 DIAGNOSIS — R42 LIGHTHEADEDNESS: Primary | ICD-10-CM

## 2022-09-02 PROCEDURE — 99213 OFFICE O/P EST LOW 20 MIN: CPT | Performed by: FAMILY MEDICINE

## 2022-09-02 ASSESSMENT — PATIENT HEALTH QUESTIONNAIRE - PHQ9
SUM OF ALL RESPONSES TO PHQ QUESTIONS 1-9: 0
4. FEELING TIRED OR HAVING LITTLE ENERGY: 0
3. TROUBLE FALLING OR STAYING ASLEEP: 0
8. MOVING OR SPEAKING SO SLOWLY THAT OTHER PEOPLE COULD HAVE NOTICED. OR THE OPPOSITE, BEING SO FIGETY OR RESTLESS THAT YOU HAVE BEEN MOVING AROUND A LOT MORE THAN USUAL: 0
7. TROUBLE CONCENTRATING ON THINGS, SUCH AS READING THE NEWSPAPER OR WATCHING TELEVISION: 0
10. IF YOU CHECKED OFF ANY PROBLEMS, HOW DIFFICULT HAVE THESE PROBLEMS MADE IT FOR YOU TO DO YOUR WORK, TAKE CARE OF THINGS AT HOME, OR GET ALONG WITH OTHER PEOPLE: 0
5. POOR APPETITE OR OVEREATING: 0
SUM OF ALL RESPONSES TO PHQ QUESTIONS 1-9: 0
SUM OF ALL RESPONSES TO PHQ QUESTIONS 1-9: 0
6. FEELING BAD ABOUT YOURSELF - OR THAT YOU ARE A FAILURE OR HAVE LET YOURSELF OR YOUR FAMILY DOWN: 0
9. THOUGHTS THAT YOU WOULD BE BETTER OFF DEAD, OR OF HURTING YOURSELF: 0
2. FEELING DOWN, DEPRESSED OR HOPELESS: 0
SUM OF ALL RESPONSES TO PHQ QUESTIONS 1-9: 0

## 2022-09-02 ASSESSMENT — ANXIETY QUESTIONNAIRES
2. NOT BEING ABLE TO STOP OR CONTROL WORRYING: 0
7. FEELING AFRAID AS IF SOMETHING AWFUL MIGHT HAPPEN: 0
5. BEING SO RESTLESS THAT IT IS HARD TO SIT STILL: 0
3. WORRYING TOO MUCH ABOUT DIFFERENT THINGS: 0
1. FEELING NERVOUS, ANXIOUS, OR ON EDGE: 0
6. BECOMING EASILY ANNOYED OR IRRITABLE: 0
IF YOU CHECKED OFF ANY PROBLEMS ON THIS QUESTIONNAIRE, HOW DIFFICULT HAVE THESE PROBLEMS MADE IT FOR YOU TO DO YOUR WORK, TAKE CARE OF THINGS AT HOME, OR GET ALONG WITH OTHER PEOPLE: NOT DIFFICULT AT ALL
GAD7 TOTAL SCORE: 0
4. TROUBLE RELAXING: 0
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
## 2022-10-25 ENCOUNTER — OFFICE VISIT (OUTPATIENT)
Dept: FAMILY MEDICINE CLINIC | Age: 22
End: 2022-10-25
Payer: COMMERCIAL

## 2022-10-25 VITALS
SYSTOLIC BLOOD PRESSURE: 108 MMHG | HEART RATE: 85 BPM | DIASTOLIC BLOOD PRESSURE: 80 MMHG | BODY MASS INDEX: 40.03 KG/M2 | TEMPERATURE: 97.1 F | WEIGHT: 248 LBS | OXYGEN SATURATION: 99 %

## 2022-10-25 DIAGNOSIS — R63.5 WEIGHT GAIN: ICD-10-CM

## 2022-10-25 DIAGNOSIS — R73.9 ELEVATED BLOOD SUGAR: Primary | ICD-10-CM

## 2022-10-25 DIAGNOSIS — E66.09 OBESITY DUE TO EXCESS CALORIES WITH SERIOUS COMORBIDITY, UNSPECIFIED CLASSIFICATION: ICD-10-CM

## 2022-10-25 LAB — HBA1C MFR BLD: 5.5 %

## 2022-10-25 PROCEDURE — 83036 HEMOGLOBIN GLYCOSYLATED A1C: CPT | Performed by: NURSE PRACTITIONER

## 2022-10-25 PROCEDURE — 99213 OFFICE O/P EST LOW 20 MIN: CPT | Performed by: NURSE PRACTITIONER

## 2022-10-25 RX ORDER — ZIPRASIDONE HYDROCHLORIDE 20 MG/1
CAPSULE ORAL
COMMUNITY
Start: 2022-10-01

## 2022-10-25 RX ORDER — DIVALPROEX SODIUM 500 MG/1
TABLET, EXTENDED RELEASE ORAL
COMMUNITY
Start: 2022-10-19

## 2022-10-25 ASSESSMENT — ANXIETY QUESTIONNAIRES
GAD7 TOTAL SCORE: 0
1. FEELING NERVOUS, ANXIOUS, OR ON EDGE: 0
7. FEELING AFRAID AS IF SOMETHING AWFUL MIGHT HAPPEN: 0
4. TROUBLE RELAXING: 0
2. NOT BEING ABLE TO STOP OR CONTROL WORRYING: 0
6. BECOMING EASILY ANNOYED OR IRRITABLE: 0
5. BEING SO RESTLESS THAT IT IS HARD TO SIT STILL: 0
3. WORRYING TOO MUCH ABOUT DIFFERENT THINGS: 0
IF YOU CHECKED OFF ANY PROBLEMS ON THIS QUESTIONNAIRE, HOW DIFFICULT HAVE THESE PROBLEMS MADE IT FOR YOU TO DO YOUR WORK, TAKE CARE OF THINGS AT HOME, OR GET ALONG WITH OTHER PEOPLE: NOT DIFFICULT AT ALL

## 2022-10-25 ASSESSMENT — PATIENT HEALTH QUESTIONNAIRE - PHQ9
SUM OF ALL RESPONSES TO PHQ QUESTIONS 1-9: 0
SUM OF ALL RESPONSES TO PHQ9 QUESTIONS 1 & 2: 0
DEPRESSION UNABLE TO ASSESS: FUNCTIONAL CAPACITY MOTIVATION LIMITS ACCURACY
1. LITTLE INTEREST OR PLEASURE IN DOING THINGS: 0
2. FEELING DOWN, DEPRESSED OR HOPELESS: 0
SUM OF ALL RESPONSES TO PHQ QUESTIONS 1-9: 0

## 2022-10-25 NOTE — PATIENT INSTRUCTIONS
Limit intake of sweets/\"simple carbs\"  Increase exercise- goal 150 minutes/week  Avoid sugary drinks  Recheck A1C in 3 months

## 2022-10-25 NOTE — PROGRESS NOTES
10/25/2022     Chief Complaint   Patient presents with    Blood Sugar Problem     Stated that he was diagnosed that he is a diabetic three years ago and sugars has been high . Nirmala Piedra (:  2000) is a 23 y.o. adult, here for evaluation of the following medical concerns:    HPI    Here to discuss blood sugar. Diagnosed with DM in 2019- used to be on Metformin but has not been for the last few years. Checking glucose at home- fasting glucose 120 and checking 30 minutes post-prandial is 150. Denies polyuria, polydipsia or polyphagia, rash. Not exercising at all. Diet: nothing specific. Does not drink regular soda. Has gained an estimated 20 lbs over the last six months. thinks it is from psych meds because there has been no change in diet or exercise. (Weights in chart seem to be inaccurate)     Psych- Dr. Barb Schirmer at [facility]    TSH   Date Value Ref Range Status   2022 1.42 0.27 - 4.20 uIU/mL Final       Review of Systems   Constitutional:  Negative for diaphoresis and fatigue. Endocrine: Negative for polydipsia, polyphagia and polyuria. Prior to Visit Medications    Medication Sig Taking? Authorizing Provider   ziprasidone (GEODON) 20 MG capsule  Yes Historical Provider, MD   divalproex (DEPAKOTE ER) 500 MG extended release tablet TAKE 2 TABLETS BY MOUTH AT BEDTIME.  START TAKING AFTER FINISHING WITH THE DEPAKOTE DISCARD REMAINDER Yes Historical Provider, MD   hydrOXYzine HCl (ATARAX) 50 MG tablet Take 1 tablet by mouth 2 times daily as needed for Anxiety Yes Anderson MD [place]   paliperidone (INVEGA) 6 MG extended release tablet Take 2 tablets by mouth every morning Yes Anderson MD [place]   QUEtiapine (SEROQUEL) 200 MG tablet Take 1 tablet by mouth at bedtime  Patient taking differently: Take 200 mg by mouth 2 times daily Yes Justin Nuñez MD   lithium (LITHOBID) 300 MG extended release tablet Take 1 tablet by mouth at bedtime  Anderson MD [place] Social History     Tobacco Use    Smoking status: Never    Smokeless tobacco: Never   Substance Use Topics    Alcohol use: Not Currently        Vitals:    10/25/22 0942   BP: 108/80   Site: Left Upper Arm   Position: Sitting   Pulse: 85   Temp: 97.1 °F (36.2 °C)   TempSrc: Temporal   SpO2: 99%   Weight: 248 lb (112.5 kg)     Estimated body mass index is 40.03 kg/m² as calculated from the following:    Height as of 8/31/22: 5' 6\" (1.676 m). Weight as of this encounter: 248 lb (112.5 kg). Physical Exam  Vitals and nursing note reviewed. Constitutional:       General: He is not in acute distress. Appearance: Normal appearance. He is obese. He is not ill-appearing, toxic-appearing or diaphoretic. HENT:      Head: Normocephalic and atraumatic. Cardiovascular:      Rate and Rhythm: Normal rate and regular rhythm. Heart sounds: Normal heart sounds. No murmur heard. No friction rub. No gallop. Pulmonary:      Effort: Pulmonary effort is normal. No respiratory distress. Breath sounds: Normal breath sounds. No stridor. No wheezing or rales. Neurological:      General: No focal deficit present. Mental Status: He is alert and oriented to person, place, and time. Mental status is at baseline. Cranial Nerves: No cranial nerve deficit. ASSESSMENT/PLAN:  1. Elevated blood sugar  - POCT glycosylated hemoglobin (Hb A1C)    2. Obesity due to excess calories with serious comorbidity, unspecified classification    3. Weight gain    - Hgb A1C 5.5 today  - Discussed dietary and lifestyle modifications to help with glycemic control  - Recommend 150 minutes of exercise per week  - Follow up in 3 months    Return in about 3 months (around 1/25/2023). An electronic signature was used to authenticate this note.     --SONIA French - CNP on 10/25/2022 at 10:07 AM

## 2023-01-16 ENCOUNTER — HOSPITAL ENCOUNTER (EMERGENCY)
Age: 23
Discharge: HOME | End: 2023-01-16
Payer: COMMERCIAL

## 2023-01-16 VITALS
BODY MASS INDEX: 40.03 KG/M2 | HEART RATE: 80 BPM | OXYGEN SATURATION: 97 % | SYSTOLIC BLOOD PRESSURE: 128 MMHG | TEMPERATURE: 98.3 F | DIASTOLIC BLOOD PRESSURE: 78 MMHG | RESPIRATION RATE: 16 BRPM | WEIGHT: 248 LBS

## 2023-01-16 DIAGNOSIS — Z72.89 DELIBERATE SELF-CUTTING: ICD-10-CM

## 2023-01-16 DIAGNOSIS — S51.819A SUPERFICIAL LACERATION OF FOREARM: Primary | ICD-10-CM

## 2023-01-16 PROCEDURE — 99282 EMERGENCY DEPT VISIT SF MDM: CPT

## 2023-01-16 PROCEDURE — 12001 RPR S/N/AX/GEN/TRNK 2.5CM/<: CPT

## 2023-01-16 PROCEDURE — 12002 RPR S/N/AX/GEN/TRNK2.6-7.5CM: CPT

## 2023-01-16 ASSESSMENT — PAIN - FUNCTIONAL ASSESSMENT: PAIN_FUNCTIONAL_ASSESSMENT: NONE - DENIES PAIN

## 2023-01-17 ASSESSMENT — ENCOUNTER SYMPTOMS
DIARRHEA: 0
BACK PAIN: 0
ABDOMINAL PAIN: 0
CONSTIPATION: 0
NAUSEA: 0
VOMITING: 0
RHINORRHEA: 0
SHORTNESS OF BREATH: 0
SORE THROAT: 0
COUGH: 0
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
## 2023-02-16 ENCOUNTER — OFFICE VISIT (OUTPATIENT)
Dept: FAMILY MEDICINE CLINIC | Age: 23
End: 2023-02-16

## 2023-02-16 VITALS
DIASTOLIC BLOOD PRESSURE: 83 MMHG | RESPIRATION RATE: 16 BRPM | WEIGHT: 255 LBS | SYSTOLIC BLOOD PRESSURE: 111 MMHG | TEMPERATURE: 97.1 F | BODY MASS INDEX: 41.16 KG/M2 | OXYGEN SATURATION: 98 % | HEART RATE: 88 BPM

## 2023-02-16 DIAGNOSIS — D72.829 LEUKOCYTOSIS, UNSPECIFIED TYPE: ICD-10-CM

## 2023-02-16 DIAGNOSIS — R00.2 PALPITATIONS: ICD-10-CM

## 2023-02-16 DIAGNOSIS — R55 SYNCOPE, UNSPECIFIED SYNCOPE TYPE: ICD-10-CM

## 2023-02-16 DIAGNOSIS — R73.9 HYPERGLYCEMIA: ICD-10-CM

## 2023-02-16 DIAGNOSIS — R42 DIZZINESS: Primary | ICD-10-CM

## 2023-02-16 LAB
BASOPHILS ABSOLUTE: 0.1 K/UL (ref 0–0.2)
BASOPHILS RELATIVE PERCENT: 1.2 %
EOSINOPHILS ABSOLUTE: 0.2 K/UL (ref 0–0.6)
EOSINOPHILS RELATIVE PERCENT: 3.7 %
HBA1C MFR BLD: 5.5 %
HCT VFR BLD CALC: 37.2 % (ref 36–48)
HEMOGLOBIN: 12.4 G/DL (ref 12–16)
LYMPHOCYTES ABSOLUTE: 1.7 K/UL (ref 1–5.1)
LYMPHOCYTES RELATIVE PERCENT: 31.1 %
MCH RBC QN AUTO: 29.3 PG (ref 26–34)
MCHC RBC AUTO-ENTMCNC: 33.2 G/DL (ref 31–36)
MCV RBC AUTO: 88.3 FL (ref 80–100)
MONOCYTES ABSOLUTE: 0.4 K/UL (ref 0–1.3)
MONOCYTES RELATIVE PERCENT: 7.9 %
NEUTROPHILS ABSOLUTE: 3 K/UL (ref 1.7–7.7)
NEUTROPHILS RELATIVE PERCENT: 56.1 %
PDW BLD-RTO: 12.5 % (ref 12.4–15.4)
PLATELET # BLD: 327 K/UL (ref 135–450)
PMV BLD AUTO: 7.5 FL (ref 5–10.5)
RBC # BLD: 4.21 M/UL (ref 4–5.2)
WBC # BLD: 5.4 K/UL (ref 4–11)

## 2023-02-16 RX ORDER — PALIPERIDONE PALMITATE 156 MG/ML
INJECTION INTRAMUSCULAR
COMMUNITY
Start: 2023-01-30

## 2023-02-16 SDOH — ECONOMIC STABILITY: FOOD INSECURITY: WITHIN THE PAST 12 MONTHS, THE FOOD YOU BOUGHT JUST DIDN'T LAST AND YOU DIDN'T HAVE MONEY TO GET MORE.: NEVER TRUE

## 2023-02-16 SDOH — ECONOMIC STABILITY: INCOME INSECURITY: HOW HARD IS IT FOR YOU TO PAY FOR THE VERY BASICS LIKE FOOD, HOUSING, MEDICAL CARE, AND HEATING?: NOT HARD AT ALL

## 2023-02-16 SDOH — ECONOMIC STABILITY: FOOD INSECURITY: WITHIN THE PAST 12 MONTHS, YOU WORRIED THAT YOUR FOOD WOULD RUN OUT BEFORE YOU GOT MONEY TO BUY MORE.: NEVER TRUE

## 2023-02-16 ASSESSMENT — PATIENT HEALTH QUESTIONNAIRE - PHQ9
2. FEELING DOWN, DEPRESSED OR HOPELESS: 0
6. FEELING BAD ABOUT YOURSELF - OR THAT YOU ARE A FAILURE OR HAVE LET YOURSELF OR YOUR FAMILY DOWN: 0
SUM OF ALL RESPONSES TO PHQ QUESTIONS 1-9: 0
10. IF YOU CHECKED OFF ANY PROBLEMS, HOW DIFFICULT HAVE THESE PROBLEMS MADE IT FOR YOU TO DO YOUR WORK, TAKE CARE OF THINGS AT HOME, OR GET ALONG WITH OTHER PEOPLE: 0
7. TROUBLE CONCENTRATING ON THINGS, SUCH AS READING THE NEWSPAPER OR WATCHING TELEVISION: 0
8. MOVING OR SPEAKING SO SLOWLY THAT OTHER PEOPLE COULD HAVE NOTICED. OR THE OPPOSITE, BEING SO FIGETY OR RESTLESS THAT YOU HAVE BEEN MOVING AROUND A LOT MORE THAN USUAL: 0
SUM OF ALL RESPONSES TO PHQ QUESTIONS 1-9: 0
3. TROUBLE FALLING OR STAYING ASLEEP: 0
9. THOUGHTS THAT YOU WOULD BE BETTER OFF DEAD, OR OF HURTING YOURSELF: 0
DEPRESSION UNABLE TO ASSESS: FUNCTIONAL CAPACITY MOTIVATION LIMITS ACCURACY
SUM OF ALL RESPONSES TO PHQ9 QUESTIONS 1 & 2: 0
SUM OF ALL RESPONSES TO PHQ QUESTIONS 1-9: 0
1. LITTLE INTEREST OR PLEASURE IN DOING THINGS: 0
4. FEELING TIRED OR HAVING LITTLE ENERGY: 0
5. POOR APPETITE OR OVEREATING: 0
SUM OF ALL RESPONSES TO PHQ QUESTIONS 1-9: 0

## 2023-02-16 ASSESSMENT — ANXIETY QUESTIONNAIRES
IF YOU CHECKED OFF ANY PROBLEMS ON THIS QUESTIONNAIRE, HOW DIFFICULT HAVE THESE PROBLEMS MADE IT FOR YOU TO DO YOUR WORK, TAKE CARE OF THINGS AT HOME, OR GET ALONG WITH OTHER PEOPLE: NOT DIFFICULT AT ALL
3. WORRYING TOO MUCH ABOUT DIFFERENT THINGS: 0
2. NOT BEING ABLE TO STOP OR CONTROL WORRYING: 0
1. FEELING NERVOUS, ANXIOUS, OR ON EDGE: 0
6. BECOMING EASILY ANNOYED OR IRRITABLE: 0
5. BEING SO RESTLESS THAT IT IS HARD TO SIT STILL: 0
4. TROUBLE RELAXING: 0
7. FEELING AFRAID AS IF SOMETHING AWFUL MIGHT HAPPEN: 0
GAD7 TOTAL SCORE: 0

## 2023-02-16 ASSESSMENT — ENCOUNTER SYMPTOMS
COUGH: 0
SHORTNESS OF BREATH: 0
DIARRHEA: 0
VOMITING: 0
NAUSEA: 0

## 2023-02-16 NOTE — PATIENT INSTRUCTIONS
- make appointment with heart doctor  - repeat blood work today  - no driving when having these symptoms  - call for any new or worsening of symptoms

## 2023-02-16 NOTE — PROGRESS NOTES
2023     Chief Complaint   Patient presents with    Follow-up     Speech slurring, hospital thought it was a stoke , turns out it was not they do not know what happened with patient stated that he was told that it was just congestion ,        Nirmala Piedra (:  2000) is a 23 y.o. adult, here for evaluation of the following medical concerns:    HPI    ED follow up for dizziness, weakness, slurred speech. Has had 3 episodes over the lat two weeks. Will start feeling \"weird\" and then speech will be slurred and feels like can not walk straight. Has some palpitations without chest pain during these periods. Symptoms will last few an hour but feels tired and off the rest of the day. Was seen at the ED on 23 and 2/10/23 ([address] [place] [address]). Reports while he was there his HR was dropping down in the 30s on the monitor. Wore 24 hour Holter monitor on 23- NSR with frequent PVCs, bigeminy present. Avg HR 75, Max , min heart rate 36. Had no symptoms for this 24 hour period of the monitor. Reports he had a syncopal event while in the ED on 2/10/23. Reviewed labs and imaging from both ED visit. His WBC count was 16.4, neutrophils 85, glucose 247. CTA head/neck negative. Dr Fernandez Margie  Has f/u Monday   Currently on Invega injection     No results found for this visit on 23. Review of Systems   Constitutional:  Negative for chills, diaphoresis, fatigue and fever. Respiratory:  Negative for cough and shortness of breath. Cardiovascular:  Negative for chest pain and leg swelling. Gastrointestinal:  Negative for diarrhea, nausea and vomiting. Neurological:  Positive for speech difficulty, weakness and light-headedness. Negative for dizziness, facial asymmetry, numbness and headaches. All other systems reviewed and are negative. Prior to Visit Medications    Medication Sig Taking?  Authorizing Provider   INVEGA SUSTENNA 156 MG/ML RALF IM injection  Yes Historical Provider, MD lithium (LITHOBID) 300 MG extended release tablet Take 1 tablet by mouth at bedtime  Seamus Pinto MD        Social History     Tobacco Use    Smoking status: Never    Smokeless tobacco: Never   Substance Use Topics    Alcohol use: Not Currently        Vitals:    02/16/23 1042   BP: 111/83   Site: Left Upper Arm   Position: Sitting   Pulse: 88   Resp: 16   Temp: 97.1 °F (36.2 °C)   TempSrc: Temporal   SpO2: 98%   Weight: 255 lb (115.7 kg)     Estimated body mass index is 41.16 kg/m² as calculated from the following:    Height as of 8/31/22: 5' 6\" (1.676 m). Weight as of this encounter: 255 lb (115.7 kg). Physical Exam  Vitals and nursing note reviewed. Constitutional:       General: He is not in acute distress. Appearance: Normal appearance. He is not ill-appearing, toxic-appearing or diaphoretic. HENT:      Head: Normocephalic and atraumatic. Cardiovascular:      Rate and Rhythm: Normal rate and regular rhythm. Heart sounds: Normal heart sounds. No murmur heard. No friction rub. No gallop. Pulmonary:      Effort: Pulmonary effort is normal. No respiratory distress. Breath sounds: Normal breath sounds. No stridor. No wheezing or rales. Neurological:      General: No focal deficit present. Mental Status: He is alert and oriented to person, place, and time. Mental status is at baseline. Cranial Nerves: No cranial nerve deficit. ASSESSMENT/PLAN:  1. Dizziness    2. Palpitations    - [facility]    3. Syncope, unspecified syncope type  - [facility]    4. Leukocytosis, unspecified type    - CBC with Auto Differential; Future    5. Hyperglycemia  - POCT glycosylated hemoglobin (Hb A1C)    -Refer to cardiology for further evaluation of his symptoms. Did wear 24-hour Holter monitor but no symptoms during that time. Is having palpitations during these episodes and reports bradycardia monitoring.   Advised no driving if symptomatic. We will recheck CBC to trend white count. Hemoglobin A1c is 5.5 again today    Return if symptoms worsen or fail to improve. An electronic signature was used to authenticate this note.     --SONIA Vásquez - CNP on 2/16/2023 at 11:54 AM    Time spent: 34 mins

## 2023-02-22 ENCOUNTER — HOSPITAL ENCOUNTER (INPATIENT)
Age: 23
LOS: 2 days | Discharge: HOME | End: 2023-02-24
Attending: STUDENT IN AN ORGANIZED HEALTH CARE EDUCATION/TRAINING PROGRAM | Admitting: PSYCHIATRY & NEUROLOGY
Payer: COMMERCIAL

## 2023-02-22 DIAGNOSIS — R45.851 SUICIDAL IDEATION: Primary | ICD-10-CM

## 2023-02-22 DIAGNOSIS — F22 DELUSION (HCC): ICD-10-CM

## 2023-02-22 LAB
A/G RATIO: 2 (ref 1.1–2.2)
ACETAMINOPHEN LEVEL: <5 UG/ML (ref 10–30)
ALBUMIN SERPL-MCNC: 5 G/DL (ref 3.4–5)
ALP BLD-CCNC: 86 U/L (ref 40–129)
ALT SERPL-CCNC: 17 U/L (ref 10–40)
ANION GAP SERPL CALCULATED.3IONS-SCNC: 13 MMOL/L (ref 3–16)
AST SERPL-CCNC: 14 U/L (ref 15–37)
BASOPHILS ABSOLUTE: 0.1 K/UL (ref 0–0.2)
BASOPHILS RELATIVE PERCENT: 0.9 %
BILIRUB SERPL-MCNC: <0.2 MG/DL (ref 0–1)
BUN BLDV-MCNC: 10 MG/DL (ref 7–20)
CALCIUM SERPL-MCNC: 10 MG/DL (ref 8.3–10.6)
CHLORIDE BLD-SCNC: 102 MMOL/L (ref 99–110)
CO2: 24 MMOL/L (ref 21–32)
CREAT SERPL-MCNC: <0.5 MG/DL (ref 0.6–1.1)
EOSINOPHILS ABSOLUTE: 0.1 K/UL (ref 0–0.6)
EOSINOPHILS RELATIVE PERCENT: 0.7 %
ETHANOL: NORMAL MG/DL (ref 0–0.08)
GFR SERPL CREATININE-BSD FRML MDRD: >60 ML/MIN/{1.73_M2}
GLUCOSE BLD-MCNC: 101 MG/DL (ref 70–99)
HCG QUALITATIVE: NEGATIVE
HCT VFR BLD CALC: 39.1 % (ref 36–48)
HEMOGLOBIN: 13.5 G/DL (ref 12–16)
INFLUENZA A: NOT DETECTED
INFLUENZA B: NOT DETECTED
LYMPHOCYTES ABSOLUTE: 1.9 K/UL (ref 1–5.1)
LYMPHOCYTES RELATIVE PERCENT: 20.7 %
MCH RBC QN AUTO: 29.8 PG (ref 26–34)
MCHC RBC AUTO-ENTMCNC: 34.4 G/DL (ref 31–36)
MCV RBC AUTO: 86.7 FL (ref 80–100)
MONOCYTES ABSOLUTE: 0.6 K/UL (ref 0–1.3)
MONOCYTES RELATIVE PERCENT: 6.5 %
NEUTROPHILS ABSOLUTE: 6.5 K/UL (ref 1.7–7.7)
NEUTROPHILS RELATIVE PERCENT: 71.2 %
PDW BLD-RTO: 12.5 % (ref 12.4–15.4)
PLATELET # BLD: 420 K/UL (ref 135–450)
PMV BLD AUTO: 6.9 FL (ref 5–10.5)
POTASSIUM REFLEX MAGNESIUM: 4.2 MMOL/L (ref 3.5–5.1)
RBC # BLD: 4.51 M/UL (ref 4–5.2)
SALICYLATE, SERUM: <0.3 MG/DL (ref 15–30)
SARS-COV-2 RNA, RT PCR: NOT DETECTED
SODIUM BLD-SCNC: 139 MMOL/L (ref 136–145)
TOTAL PROTEIN: 7.5 G/DL (ref 6.4–8.2)
TSH SERPL DL<=0.05 MIU/L-ACNC: 1.97 UIU/ML (ref 0.27–4.2)
WBC # BLD: 9.1 K/UL (ref 4–11)

## 2023-02-22 PROCEDURE — 85025 COMPLETE CBC W/AUTO DIFF WBC: CPT

## 2023-02-22 PROCEDURE — 80053 COMPREHEN METABOLIC PANEL: CPT

## 2023-02-22 PROCEDURE — 80061 LIPID PANEL: CPT

## 2023-02-22 PROCEDURE — 84443 ASSAY THYROID STIM HORMONE: CPT

## 2023-02-22 PROCEDURE — 99285 EMERGENCY DEPT VISIT HI MDM: CPT

## 2023-02-22 PROCEDURE — 80143 DRUG ASSAY ACETAMINOPHEN: CPT

## 2023-02-22 PROCEDURE — 36415 COLL VENOUS BLD VENIPUNCTURE: CPT

## 2023-02-22 PROCEDURE — 83036 HEMOGLOBIN GLYCOSYLATED A1C: CPT

## 2023-02-22 PROCEDURE — 87636 SARSCOV2 & INF A&B AMP PRB: CPT

## 2023-02-22 PROCEDURE — 80179 DRUG ASSAY SALICYLATE: CPT

## 2023-02-22 PROCEDURE — 82077 ASSAY SPEC XCP UR&BREATH IA: CPT

## 2023-02-22 PROCEDURE — 84703 CHORIONIC GONADOTROPIN ASSAY: CPT

## 2023-02-22 PROCEDURE — 1240000000 HC EMOTIONAL WELLNESS R&B

## 2023-02-22 RX ORDER — BENZTROPINE MESYLATE 1 MG/ML
2 INJECTION INTRAMUSCULAR; INTRAVENOUS 2 TIMES DAILY PRN
Status: DISCONTINUED | OUTPATIENT
Start: 2023-02-22 | End: 2023-02-24 | Stop reason: HOSPADM

## 2023-02-22 RX ORDER — TRAZODONE HYDROCHLORIDE 50 MG/1
50 TABLET ORAL NIGHTLY PRN
Status: DISCONTINUED | OUTPATIENT
Start: 2023-02-22 | End: 2023-02-24 | Stop reason: HOSPADM

## 2023-02-22 RX ORDER — POLYETHYLENE GLYCOL 3350 17 G
2 POWDER IN PACKET (EA) ORAL
Status: DISCONTINUED | OUTPATIENT
Start: 2023-02-22 | End: 2023-02-24 | Stop reason: HOSPADM

## 2023-02-22 RX ORDER — OLANZAPINE 10 MG/1
10 TABLET ORAL EVERY 4 HOURS PRN
Status: DISCONTINUED | OUTPATIENT
Start: 2023-02-22 | End: 2023-02-24 | Stop reason: HOSPADM

## 2023-02-22 RX ORDER — NICOTINE 21 MG/24HR
1 PATCH, TRANSDERMAL 24 HOURS TRANSDERMAL DAILY
Status: DISCONTINUED | OUTPATIENT
Start: 2023-02-23 | End: 2023-02-24 | Stop reason: HOSPADM

## 2023-02-22 RX ORDER — HYDROXYZINE 50 MG/1
50 TABLET, FILM COATED ORAL 3 TIMES DAILY PRN
Status: DISCONTINUED | OUTPATIENT
Start: 2023-02-22 | End: 2023-02-24 | Stop reason: HOSPADM

## 2023-02-22 RX ORDER — MAGNESIUM HYDROXIDE/ALUMINUM HYDROXICE/SIMETHICONE 120; 1200; 1200 MG/30ML; MG/30ML; MG/30ML
30 SUSPENSION ORAL EVERY 6 HOURS PRN
Status: DISCONTINUED | OUTPATIENT
Start: 2023-02-22 | End: 2023-02-24 | Stop reason: HOSPADM

## 2023-02-22 RX ORDER — IBUPROFEN 400 MG/1
400 TABLET ORAL EVERY 6 HOURS PRN
Status: DISCONTINUED | OUTPATIENT
Start: 2023-02-22 | End: 2023-02-24 | Stop reason: HOSPADM

## 2023-02-22 RX ORDER — ACETAMINOPHEN 325 MG/1
650 TABLET ORAL EVERY 4 HOURS PRN
Status: DISCONTINUED | OUTPATIENT
Start: 2023-02-22 | End: 2023-02-24 | Stop reason: HOSPADM

## 2023-02-22 SDOH — ECONOMIC STABILITY: FOOD INSECURITY: WITHIN THE PAST 12 MONTHS, THE FOOD YOU BOUGHT JUST DIDN'T LAST AND YOU DIDN'T HAVE MONEY TO GET MORE.: NEVER TRUE

## 2023-02-22 ASSESSMENT — PAIN - FUNCTIONAL ASSESSMENT: PAIN_FUNCTIONAL_ASSESSMENT: NONE - DENIES PAIN

## 2023-02-22 ASSESSMENT — SOCIAL DETERMINANTS OF HEALTH (SDOH): HOW HARD IS IT FOR YOU TO PAY FOR THE VERY BASICS LIKE FOOD, HOUSING, MEDICAL CARE, AND HEATING?: NOT HARD AT ALL

## 2023-02-23 PROBLEM — F10.10 ALCOHOL ABUSE: Status: ACTIVE | Noted: 2023-02-23

## 2023-02-23 PROBLEM — F29 PSYCHOSIS (HCC): Status: ACTIVE | Noted: 2023-02-23

## 2023-02-23 LAB
CHOLESTEROL, TOTAL: 155 MG/DL (ref 0–199)
ESTIMATED AVERAGE GLUCOSE: 114 MG/DL
HBA1C MFR BLD: 5.6 %
HDLC SERPL-MCNC: 45 MG/DL (ref 40–60)
LDL CHOLESTEROL CALCULATED: 89 MG/DL
TRIGL SERPL-MCNC: 106 MG/DL (ref 0–150)
VLDLC SERPL CALC-MCNC: 21 MG/DL

## 2023-02-23 PROCEDURE — 99221 1ST HOSP IP/OBS SF/LOW 40: CPT

## 2023-02-23 PROCEDURE — 6370000000 HC RX 637 (ALT 250 FOR IP): Performed by: PSYCHIATRY & NEUROLOGY

## 2023-02-23 PROCEDURE — 1240000000 HC EMOTIONAL WELLNESS R&B

## 2023-02-23 SDOH — ECONOMIC STABILITY: FOOD INSECURITY: WITHIN THE PAST 12 MONTHS, YOU WORRIED THAT YOUR FOOD WOULD RUN OUT BEFORE YOU GOT MONEY TO BUY MORE.: NEVER TRUE

## 2023-02-23 SDOH — ECONOMIC STABILITY: FOOD INSECURITY: WITHIN THE PAST 12 MONTHS, THE FOOD YOU BOUGHT JUST DIDN'T LAST AND YOU DIDN'T HAVE MONEY TO GET MORE.: NEVER TRUE

## 2023-02-23 SDOH — ECONOMIC STABILITY: INCOME INSECURITY: IN THE LAST 12 MONTHS, WAS THERE A TIME WHEN YOU WERE NOT ABLE TO PAY THE MORTGAGE OR RENT ON TIME?: NO

## 2023-02-23 SDOH — ECONOMIC STABILITY: HOUSING INSECURITY: IN THE LAST 12 MONTHS, HOW MANY PLACES HAVE YOU LIVED?: 1

## 2023-02-23 ASSESSMENT — SLEEP AND FATIGUE QUESTIONNAIRES
DO YOU USE A SLEEP AID: NO
DO YOU HAVE DIFFICULTY SLEEPING: NO
DO YOU USE A SLEEP AID: NO
DO YOU HAVE DIFFICULTY SLEEPING: NO
AVERAGE NUMBER OF SLEEP HOURS: 7
AVERAGE NUMBER OF SLEEP HOURS: 7

## 2023-02-23 ASSESSMENT — LIFESTYLE VARIABLES
HOW OFTEN DO YOU HAVE A DRINK CONTAINING ALCOHOL: 4 OR MORE TIMES A WEEK
HOW MANY STANDARD DRINKS CONTAINING ALCOHOL DO YOU HAVE ON A TYPICAL DAY: 3 OR 4
HOW MANY STANDARD DRINKS CONTAINING ALCOHOL DO YOU HAVE ON A TYPICAL DAY: 3 OR 4
HOW OFTEN DO YOU HAVE A DRINK CONTAINING ALCOHOL: 4 OR MORE TIMES A WEEK

## 2023-02-23 ASSESSMENT — PATIENT HEALTH QUESTIONNAIRE - PHQ9
1. LITTLE INTEREST OR PLEASURE IN DOING THINGS: NOT AT ALL
2. FEELING DOWN, DEPRESSED OR HOPELESS: NOT AT ALL
SUM OF ALL RESPONSES TO PHQ9 QUESTIONS 1 & 2: 0

## 2023-02-23 NOTE — DISCHARGE INSTRUCTIONS
Advanced Directives:  Patient does not have a surrogate decision maker appointed   Name (if yes): N/A Phone Number: N/A  Patient does not have a psychiatric and/ or medical advanced directive or power of . Patient was offered psychiatric and/ or medical advanced directive or power of  information/completion but declined to complete   Why not? Declined    Discharge Planning is Complete. Discharge Date: 2/24/23  Reason for Hospitalization: Inability to care for self  Discharge Diagnosis: Borderline personality disorder [facility])  Discharging to: Private Domicile     Your instructions: Your physician here was Sofya Zafar MD. If you have any questions please call the unit at [phone] for the adult unit or [phone] for [facility]. Please note that we have a patient family advisory [place] that meets the second Wednesday of January and the second Wednesday of July at [address] [facility] in [place] at [facility]. Department leadership would love for you to attend to give feedback on what we are doing well and areas in which we can improve our patient care. Please come if you are able and feel free to reach out to [address] [place] [address] at [phone] with any questions. The crisis number for [facility] is [phone] (SAVE). This crisis line is available 24 hours a day, seven days a week. Please follow up with your PCP regarding any pending labs. You have an upcoming appointment with your primary care physician. You reported that the time of the appointment is in your phone. See below.    Name of Provider: Dianne Stevens  Provider specialty/license: Family Medicine  Date and time of appointment: 3/22/23  The type/s of services requested are: Primary Care   Agency name: [facility]  Address: [address] [place] [address]  Phone Number: [phone]  Special instructions (what to bring to appointment, etc.): Please bring photo ID, insurance card, and any copays. Please arrive 15 minutes early to your appointment. If you need to reschedule or cancel, please call the number  provided above at least 24-hours before your scheduled appointment time. You are connected to [facility] for therapy services. You have an upcoming appointment scheduled. You reported that the time of the appointment is in your phone. See below. Name of Provider: Dewayne Castrejon   Provider specialty/license: Mental Health Counseling   Date and time of appointment: Wednesday 3/1/23  The type/s of services requested are: [address]. name: Sorin Mondragon  Address: [address] [place] [address] [address], [place], [address] [facility]  Phone Number: [phone]  Special instructions (what to bring to appointment, etc.):     You are connected to [facility] for medication management services. You have an upcoming appointment scheduled. You reported that the time of the appointment is in your phone. See below. Name of Provider: Dr. Olman Corona  Provider specialty/license: Medication Management   Date and time of appointment: 3/20/23  The type/s of services requested are: Medication Management   Agency name: [address] [place] [address]   Special instructions (what to bring to appointment, etc.):     Discharge Completed By: MARLENE Troncoso  Fax to: Follow up provider name and number  [facility] PCP - Discharge transmitted via EHR  Sorin @ [phone]    The following personal items were collected during your admission and were returned to you:    Belongings  Dental Appliances: None  Vision - Corrective Lenses: Eyeglasses  Hearing Aid: None  Clothing: Pants, Shirt  Jewelry: None  Body Piercings Removed: No  Electronic Devices: Cell Phone, Other (Comment) (wallet)  Weapons (Notify Protective Services/Security): None  Other Valuables: Money, Other (Comment) (150$)  Home Medications: None  Valuables Given To:  Other (Comment) (safe)  Provide Name(s) of Who Valuable(s) Were Given To: safe    By signing below, I understand and acknowledge receipt of the instructions indicated above.

## 2023-02-23 NOTE — ED NOTES
Patient was offered to sign an application for voluntary admission but declined. Patient placed on a statement of belief by Dr. Leanna Doherty.       Odalis Alves RN  02/22/23 8975
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
## 2023-02-23 NOTE — GROUP NOTE
Group Therapy Note    Date: 2/23/2023    Group Start Time: 1000  Group End Time: 7504  Group Topic: Psychoeducation    [facility]    MARLENE Jordan        Group Therapy Note  Clinician introduced the group members to mindfulness and taught them grounding techniques. Patients practiced the mindfulness skills throughout the group. Attendees: 9       Patient's Goal:      Notes:  Patient was cooperative and fully engaged in the group discussion and activity. Patient participated in the skills practice techniques for grounding. Status After Intervention:  Improved    Participation Level:  Active Listener and Interactive    Participation Quality: Appropriate      Speech:  normal      Thought Process/Content: Delusional      Affective Functioning: Congruent      Mood: dysphoric      Level of consciousness:  Alert      Response to Learning: Capable of insight      Endings: None Reported    Modes of Intervention: Education and Activity      Discipline Responsible: /Counselor      Signature:  MARLENE Jordan

## 2023-02-23 NOTE — ED NOTES
Patient walked to the [facility] by the medical ED staff. Patient changed into hospital attire, belongings secured and safety checks started. Patient cooperative.       Ashkan Jacobson RN  02/22/23 2037

## 2023-02-23 NOTE — ED NOTES
Collateral Contact:  Name:Johnny Rivas  Phone:[phone]  Relation to Patient: frances  Last Contact with Patient: today  Concerns:   Patient had been doing okay till recently. He has not been sleeping well and talking about angels talking to him. Today his therapist called her and wanted patient to go to the ED but patent would not. She agreed to keep and eye on him and take him to the ED if patient got worse. Patient took off later from their home and left a note which said he was trying to keep him safe and the less she knows the better. She stated that she feels patient has been delusional and is saying that he plays a role to play in the end of the world. She does not know what the patient is going to do next. He has not voiced thoughts of killing self but has talked about if he were to be dead it might save other people. She does not feel safe patient coming home at this time and thinks he might make a rash decision and hurt himself. She also stated that since patient started invega sustenna a few months ago sh feels that he does well right after getting the injection but the last week or two before next injection is due he is not doing well.    She is supportive of plan to admit [address], RN  02/22/23 3380

## 2023-02-23 NOTE — ED NOTES
Labs and covid test obtained and sent to lab. Patient could not give urine specimen. Patient given oral fluids.       Hue Montalvo RN  02/22/23 0188

## 2023-02-23 NOTE — PLAN OF CARE
Problem: Anxiety  Goal: Will report anxiety at manageable levels  Description: INTERVENTIONS:  1. Administer medication as ordered  2. Teach and rehearse alternative coping skills  3. Provide emotional support with 1:1 interaction with staff  Outcome: Not Progressing     Problem: Coping  Goal: Pt/Family able to verbalize concerns and demonstrate effective coping strategies  Description: INTERVENTIONS:  1. Assist patient/family to identify coping skills, available support systems and cultural and spiritual values  2. Provide emotional support, including active listening and acknowledgement of concerns of patient and caregivers  3. Reduce environmental stimuli, as able  4. Instruct patient/family in relaxation techniques, as appropriate  5. Assess for spiritual pain/suffering and initiate Spiritual Care, Psychosocial Clinical Specialist consults as needed  Outcome: Not Progressing     Problem: Confusion  Goal: Confusion, delirium, dementia, or psychosis is improved or at baseline  Description: INTERVENTIONS:  1. Assess for possible contributors to thought disturbance, including medications, impaired vision or hearing, underlying metabolic abnormalities, dehydration, psychiatric diagnoses, and notify attending LIP  2. [place] high risk fall precautions, as indicated  3. Provide frequent short contacts to provide reality reorientation, refocusing and direction  4. Decrease environmental stimuli, including noise as appropriate  5. Monitor and intervene to maintain adequate nutrition, hydration, elimination, sleep and activity  6. If unable to ensure safety without constant attention obtain sitter and review sitter guidelines with assigned personnel  7. Initiate Psychosocial CNS and Spiritual Care consult, as indicated  Outcome: Not Progressing     Problem: Involuntary Admit  Goal: Will cooperate with staff recommendations and doctor's orders and will demonstrate appropriate behavior  Description: INTERVENTIONS:  1. Treat underlying conditions and offer medication as ordered  2. Educate regarding involuntary admission procedures and rules  3. Contain excessive/inappropriate behavior per unit and hospital policies  Outcome: Not Progressing   2/23/23 pt isolative to room, ask pt to attend groups, pt states everyone will be afraid if I come out there, besides the world is going to end in 2 days so I don't need to know what they are saying. Pt denies SI,HI,AVH, no distress noted at this time.
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
## 2023-02-23 NOTE — PROGRESS NOTES
Behavioral Services  Medicare Certification Upon Admission    I certify that this patient's inpatient psychiatric hospital admission is medically necessary for:    [x] (1) Treatment which could reasonably be expected to improve this patient's condition,       [x] (2) Or for diagnostic study;     AND     [x](2) The inpatient psychiatric services are provided while the individual is under the care of a physician and are included in the individualized plan of care.     Estimated length of stay/service 3 d    Plan for post-hospital care outpt    Electronically signed by Luan Funes MD on 2/23/2023 at 2:11 PM

## 2023-02-23 NOTE — ED NOTES
Level of Care Disposition: admit      Patient was seen by ED provider and [facility] staff. The case presented to psychiatric provider on-call Dr. Heather West. Based on the ED evaluation and information presented to the provider by this writer it is the recommendation of the on call psychiatric provider that inpatient hospitalization is the least restrictive environment for the patient at this time. The patient will be admitted to the inpatient unit. Admitting provider did not order suicide precautions based on patient joy for safety.                    Inga Puente RN  02/22/23 [id number]

## 2023-02-24 VITALS
TEMPERATURE: 97.7 F | DIASTOLIC BLOOD PRESSURE: 72 MMHG | HEART RATE: 79 BPM | OXYGEN SATURATION: 99 % | WEIGHT: 255 LBS | SYSTOLIC BLOOD PRESSURE: 119 MMHG | HEIGHT: 67 IN | RESPIRATION RATE: 14 BRPM | BODY MASS INDEX: 40.02 KG/M2

## 2023-02-24 PROCEDURE — 5130000000 HC BRIDGE APPOINTMENT
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
## 2023-02-24 NOTE — FLOWSHEET NOTE
Purposeful Rounding    Patient Location: Patient room    Patient willing to engage in conversation: Yes    Presentation/behavior: Cooperative and Pleasant    Affect: Flat/blunted    Concerns reported: no concerns at this time    PRN medications given: n/a    Environmental assessment: Room free from clutter, Clear path to bathroom , and Adequate lighting    Fall prevention interventions in place: Yellow non-skid socks on  [place] Fall Risk Score: 0-low      Electronically signed by Allison Gutierrez RN on 2/24/23 at 8:23 AM EST

## 2023-02-24 NOTE — PLAN OF CARE
[facility]  Discharge Note    Pt discharged with followings belongings:   Dental Appliances: None  Vision - Corrective Lenses: Eyeglasses  Hearing Aid: None  Jewelry: None  Body Piercings Removed: No  Clothing: Pants, Shirt  Other Valuables: Money, Other (Comment) (150$)   Valuables sent home with patient. Patient educated on aftercare instructions: yes  Information faxed to [facility]) PCP via EHR and [facility] by this writer  at 1:05 PM .Patient verbalize understanding of AVS:  yes. Status EXAM upon discharge:  Mental Status and Behavioral Exam  Normal: No  Level of Assistance: Independent/Self  Facial Expression: Brightened  Affect: Congruent  Level of Consciousness: Alert  Frequency of Checks: 4 times per hour, close  Mood:Normal: No  Mood: Anxious  Motor Activity:Normal: No  Motor Activity: Decreased  Eye Contact: Fair  Observed Behavior: Cooperative, Friendly  Sexual Misconduct History: Current - no  Preception: Campbell to person, Campbell to time, Campbell to place  Attention:Normal: Yes  Thought Processes: Circumstantial  Thought Content:Normal: No  Thought Content: Delusions  Depression Symptoms: Isolative  Anxiety Symptoms: Generalized  Janelle Symptoms: Grandiosity  Hallucinations:  Auditory (comment)  Delusions: Yes  Delusions: Grandeur, Pentecostalism  Memory:Normal: No  Memory: Confabulation  Insight and Judgment: No  Insight and Judgment: Poor judgment, Poor insight    Tobacco Screening:  Practical Counseling, on admission, diane X, if applicable and completed (first 3 are required if patient doesn't refuse):            ( ) Recognizing danger situations (included triggers and roadblocks)                    ( ) Coping skills (new ways to manage stress,relaxation techniques, changing routine, distraction)                                                           ( ) Basic information about quitting (benefits of quitting, techniques in how to quit, available resources  ( ) Referral for counseling faxed to [facility]                                                                                                                   ( ) Patient refused counseling  ( ) Patient refused referral  ( ) Patient refused prescription upon discharge  ( x) Patient has not smoked in the last 30 days    Metabolic Screening:    Lab Results   Component Value Date    LABA1C 5.6 02/22/2023       Lab Results   Component Value Date    CHOL 155 02/22/2023    CHOL 121 08/21/2022    CHOL 141 08/13/2022    CHOL 140 04/25/2022    CHOL 156 02/22/2022    CHOL 111 06/08/2021     Lab Results   Component Value Date    TRIG 106 02/22/2023    TRIG 133 08/21/2022    TRIG 104 08/13/2022    TRIG 106 04/25/2022    TRIG 49 02/22/2022    TRIG 77 06/08/2021     Lab Results   Component Value Date    HDL 45 02/22/2023    HDL 41 08/21/2022    HDL 43 08/13/2022    HDL 46 04/25/2022    HDL 68 (H) 02/22/2022    HDL 35 (L) 06/08/2021     No components found for: LDLCAL  Lab Results   Component Value Date    LABVLDL 21 02/22/2023    LABVLDL 27 08/21/2022    LABVLDL 21 08/13/2022    LABVLDL 21 04/25/2022    LABVLDL 10 02/22/2022    LABVLDL 15 06/08/2021     Bridge Appointment completed: Reviewed Discharge Instructions with patient. Patient verbalizes understanding and agreement with the discharge plan using the teachback method.      Vaccinations (diane X if applicable and completed):  ( ) Patient states already received influenza vaccine elsewhere  ( ) Patient received influenza vaccine during this hospitalization  ( x) Patient refused influenza vaccine at this time     Smith Cedeño RN

## 2023-02-24 NOTE — PLAN OF CARE
Problem: Anxiety  Goal: Will report anxiety at manageable levels  Description: INTERVENTIONS:  1. Administer medication as ordered  2. Teach and rehearse alternative coping skills  3. Provide emotional support with 1:1 interaction with staff  2/23/2023 1438 by Reina Grimm LPN  Outcome: Not Progressing     Problem: Anxiety  Goal: Will report anxiety at manageable levels  Description: INTERVENTIONS:  1. Administer medication as ordered  2. Teach and rehearse alternative coping skills  3. Provide emotional support with 1:1 interaction with staff  2/23/2023 1438 by Reina Grimm LPN  Outcome: Not Progressing     Problem: Coping  Goal: Pt/Family able to verbalize concerns and demonstrate effective coping strategies  Description: INTERVENTIONS:  1. Assist patient/family to identify coping skills, available support systems and cultural and spiritual values  2. Provide emotional support, including active listening and acknowledgement of concerns of patient and caregivers  3. Reduce environmental stimuli, as able  4. Instruct patient/family in relaxation techniques, as appropriate  5. Assess for spiritual pain/suffering and initiate Spiritual Care, Psychosocial Clinical Specialist consults as needed  2/23/2023 1438 by Reina Grimm LPN  Outcome: Not Progressing     Problem: Confusion  Goal: Confusion, delirium, dementia, or psychosis is improved or at baseline  Description: INTERVENTIONS:  1. Assess for possible contributors to thought disturbance, including medications, impaired vision or hearing, underlying metabolic abnormalities, dehydration, psychiatric diagnoses, and notify attending LIP  2. [place] high risk fall precautions, as indicated  3. Provide frequent short contacts to provide reality reorientation, refocusing and direction  4. Decrease environmental stimuli, including noise as appropriate  5. Monitor and intervene to maintain adequate nutrition, hydration, elimination, sleep and activity  6.  If unable to ensure safety without constant attention obtain sitter and review sitter guidelines with assigned personnel  7. Initiate Psychosocial CNS and Spiritual Care consult, as indicated  2/23/2023 1438 by Gian Gray LPN  Outcome: Not Progressing     Problem: Involuntary Admit  Goal: Will cooperate with staff recommendations and doctor's orders and will demonstrate appropriate behavior  Description: INTERVENTIONS:  1. Treat underlying conditions and offer medication as ordered  2. Educate regarding involuntary admission procedures and rules  3. Contain excessive/inappropriate behavior per unit and hospital policies  3/50/0035 [id number] by Gian Gray LPN  Outcome: Not Progressing       Patient has been withdrawn to room thus far this shift. He is with brightened affect. He denies any suicidal ideations and CFS. Patient continues to report auditory hallucinations of angels talking to him. Patient denies HI. Patient reports readiness for discharge and continues to report not needing to be here. Patient more cooperative today than at admission. Continuing to monitor.

## 2023-02-24 NOTE — PROGRESS NOTES
While doing safety rounds, patient observed sitting in floor under his desk rocking back and forth. Patient endorses feeling anxious, however refused PRN to help alleviate anxiety. Patient asked what was triggering his anxiety and he replied, \"I can't tell you, you'll think I'm crazy and I'm trying to get out of here. \" Patient denies any suicidal ideations and CFS. Will continue to monitor.

## 2023-02-28 NOTE — DISCHARGE SUMMARY
Discharge Summary   Admit Date: 2/22/2023   Discharge Date:  2/24/2023    Condition at DC stable  Spent over 40 minutes with patient and staff on DCplanning with more than 50 % of time spent with patient discussing care  Final Dx: axis I:   Psychosis, unspecified  Axis 2: Borderline Personality  Disorder  [place] 3: See Medical History    And Present on Admission:   Borderline personality disorder (HCC)   Encounter for routine adult medical examination   Alcohol abuse   Psychosis (HCC)     Axis 4: Problems related to the social environment  Axis 5:  On Admission: 41-50 serious symptoms At Discharge: 61-70 mild symptoms   All conditions on Axis 1 and Axis 2 and active problems on Axis 3 were treated while patient was hospitalized. (  Active Hospital Problems    Diagnosis Date Noted    Alcohol abuse [F10.10] 02/23/2023     Priority: Medium    Psychosis (HCC) [F29] 02/23/2023     Priority: Medium    Borderline personality disorder (HCC) [F60.3] 02/23/2022    Encounter for routine adult medical examination [Z00.00]    )   Condition on DC  Mood and affect are stable and pt is not suicidal   VITALS:  /72   Pulse 79   Temp 97.7 °F (36.5 °C) (Oral)   Resp 14   Ht 5' 7\" (1.702 m)   Wt 255 lb (115.7 kg)   SpO2 99%   BMI 39.94 kg/m²   Brief Summary Present Illness   CHIEF COMPLAINT:  Psychosis.     HISTORY OF PRESENT ILLNESS:  The patient is a 22-year-old transgender  female to male who has been seen in [facility] in the past.  The patient  presented to the ED voluntarily after his fiancee brought him.   Apparently, he was exhibiting delusional thoughts and auditory  hallucinations.  Apparently, he had a therapy appointment yesterday at  [facility] and was telling his therapist about what was happening.  The  patient believes that angels have been talking to him for the past few  weeks, and now told him that the end of the world is coming and he has a  vital role in this occurring.  He stated that the Dion Hummel  is  speaking to him and apparently feels that there is something that he is  going to have to do to end the world and he will have to live in hell  after this occurs. He apparently went home and left a note for the  paule after therapy that he was concerned about the thoughts. Apparently, he left a note for the paule when she was gone that she  was not safe being around him because she did not know the role he has  to play to the end of the world, and then left the home and went for an  hour and a half drive to a hotel. Keith kept texting and keith then  found the patient and took him to the hospital.  He denies any active  SI, but is very focused on the end of the world and he stated that, \"my  fiancee and therapist think I'm crazy. \"  \"They feel like I'm  delusional.\"     The patient is convinced that these angles are talking to him and that  there are a group of angels, but José Miguelkenya Valdez was the one communicating for  the other angels. He denies SI and denies any issues with sleep or  appetite. He is on [place] with last injection on 01/27/2023. Hospital Course  Patient stabilized on meds and milieu treatment. [place], but we will give injection on  02/24/2023 - 234 mg IM. Psychosis resolved and no SI  Patient was discharged to home to continue recovery in the community.    PE: (reviewed) and labs (see medical H&PE)  Labs:    Admission on 02/22/2023, Discharged on 02/24/2023   Component Date Value Ref Range Status    WBC 02/22/2023 9.1  4.0 - 11.0 K/uL Final    RBC 02/22/2023 4.51  4.00 - 5.20 M/uL Final    Hemoglobin 02/22/2023 13.5  12.0 - 16.0 g/dL Final    Hematocrit 02/22/2023 39.1  36.0 - 48.0 % Final    MCV 02/22/2023 86.7  80.0 - 100.0 fL Final    MCH 02/22/2023 29.8  26.0 - 34.0 pg Final    MCHC 02/22/2023 34.4  31.0 - 36.0 g/dL Final    RDW 02/22/2023 12.5  12.4 - 15.4 % Final    Platelets 77/59/1454 420  135 - 450 K/uL Final    MPV 02/22/2023 6.9  5.0 - 10.5 fL Final Neutrophils % 02/22/2023 71.2  % Final    Lymphocytes % 02/22/2023 20.7  % Final    Monocytes % 02/22/2023 6.5  % Final    Eosinophils % 02/22/2023 0.7  % Final    Basophils % 02/22/2023 0.9  % Final    Neutrophils Absolute 02/22/2023 6.5  1.7 - 7.7 K/uL Final    Lymphocytes Absolute 02/22/2023 1.9  1.0 - 5.1 K/uL Final    Monocytes Absolute 02/22/2023 0.6  0.0 - 1.3 K/uL Final    Eosinophils Absolute 02/22/2023 0.1  0.0 - 0.6 K/uL Final    Basophils Absolute 02/22/2023 0.1  0.0 - 0.2 K/uL Final    Sodium 02/22/2023 139  136 - 145 mmol/L Final    Potassium reflex Magnesium 02/22/2023 4.2  3.5 - 5.1 mmol/L Final    Chloride 02/22/2023 102  99 - 110 mmol/L Final    CO2 02/22/2023 24  21 - 32 mmol/L Final    Anion Gap 02/22/2023 13  3 - 16 Final    Glucose 02/22/2023 101 (A)  70 - 99 mg/dL Final    BUN 02/22/2023 10  7 - 20 mg/dL Final    Creatinine 02/22/2023 <0.5 (A)  0.6 - 1.1 mg/dL Final    Est, Glom Filt Rate 02/22/2023 >60  >60 Final    Comment: Pediatric calculator link  https://www.kidney.org/professionals/kdoqi/gfr_calculatorped  Effective Oct 3, 2022  These results are not intended for use in patients  <18 years of age.  eGFR results are calculated without  a race factor using the 2021 CKD-EPI equation.  Careful  clinical correlation is recommended, particularly when  comparing to results calculated using previous equations.  The CKD-EPI equation is less accurate in patients with  extremes of muscle mass, extra-renal metabolism of  creatinine, excessive creatinine ingestion, or following  therapy that affects renal tubular secretion.      Calcium 02/22/2023 10.0  8.3 - 10.6 mg/dL Final    Total Protein 02/22/2023 7.5  6.4 - 8.2 g/dL Final    Albumin 02/22/2023 5.0  3.4 - 5.0 g/dL Final    Albumin/Globulin Ratio 02/22/2023 2.0  1.1 - 2.2 Final    Total Bilirubin 02/22/2023 <0.2  0.0 - 1.0 mg/dL Final    Alkaline Phosphatase 02/22/2023 86  40 - 129 U/L Final    ALT 02/22/2023 17  10 - 40 U/L Final    AST  02/22/2023 14 (A)  15 - 37 U/L Final    Ethanol Lvl 02/22/2023 None Detected  mg/dL Final    Comment:    None Detected  Conversion factor:  100 mg/dl = .100 g/dl  For Medical Purposes Only      Acetaminophen Level 02/22/2023 <5 (A)  10 - 30 ug/mL Final    Comment: Therapeutic Range: 10.0-30.0 ug/mL  Toxic: >=180 ug/mL      Salicylate, Serum 74/93/2190 <0.3 (A)  15.0 - 30.0 mg/dL Final    Comment: Therapeutic Range: 15.0-30.0 mg/dL  Toxic: >30.0 mg/dL      hCG Qual 02/22/2023 Negative  Detects HCG level >10 MIU/mL Final    SARS-CoV-2 RNA, RT PCR 02/22/2023 NOT DETECTED  NOT DETECTED Final    Comment: Not Detected results do not preclude SARS-CoV-2 infection and  should not be used as the sole basis for patient management  decisions. Results must be combined with clinical observations,  patient history, and epidemiological information. Testing was performed using [employer]QUEL SARS-CoV-2 and Influenza A/B  nucleic acid assay. This test is a multiplex Real-Time Reverse  Transcriptase Polymerase Chain Reaction (RT-PCR)-based in vitro  diagnostic test intended for the qualitative detection of nucleic  acids from SARS-CoV-2, influenza A, and influenza B in nasopharyngeal  and nasal swab specimens for use under the FDAs Emergency Use  Authorization (EUA) only. Patient Fact Sheet:  FindDrives.pl  Provider Fact Sheet: FindDrives.pl  EUA: FindDrives.pl  IFU: FindDrives.pl    Methodology:  RT-PCR      INFLUENZA A 02/22/2023 NOT DETECTED  NOT DETECTED Final    INFLUENZA B 02/22/2023 NOT DETECTED  NOT DETECTED Final    Cholesterol, Total 02/22/2023 155  0 - 199 mg/dL Final    Triglycerides 02/22/2023 106  0 - 150 mg/dL Final    HDL 02/22/2023 45  40 - 60 mg/dL Final    Comment: An HDL cholesterol less than 40 mg/dL is low and  constitutes a coronary heart disease risk factor.   An HDL cholesterol greater than 60 mg/dL is a  negative risk factor for coronary heart disease. LDL Calculated 02/22/2023 89  <100 mg/dL Final    VLDL Cholesterol Calculated 02/22/2023 21  Not Established mg/dL Final    Hemoglobin A1C 02/22/2023 5.6  See comment % Final    Comment: Comment:  Diagnosis of Diabetes: > or = 6.5%  Increased risk of diabetes (Prediabetes): 5.7-6.4%  Glycemic Control: Nonpregnant Adults: <7.0%                    Pregnant: <6.0%        eAG 02/22/2023 114.0  mg/dL Final    TSH 02/22/2023 1.97  0.27 - 4.20 uIU/mL Final        Mental Status Exam at Discharge:  Level of consciousness:  awake  Appearance:  well-appearing, in chair, good grooming and good hygiene well-developed, well-nourished  Behavior/Motor:  no abnormalities noted normal gait and station AIMS: 0  Attitude toward examiner:  cooperative, attentive and good eye contact  Speech:  spontaneous, normal rate, normal volume and well articulated  Mood:  dysthymic  Affect:  mood congruent Anxiety: mild  Hallucinations: Absent  Thought processes:  coherent Attention span, Concentration & Attention:  attention span and concentration were age appropriate  Thought content:   no evidence of delusions OCD: none    Insight: normal insight and judgment Cognition:  oriented to person, place, and time  Fund of Knowledge: average  IQ:average Memory: intact  Suicide:  No specific plan to harm self  Sleep: sleeps through the night  Appetite: ok   Reassess [place] Risk:  no specific plan to harm self Pt has phone numbers to contact if suicidal thoughts recur and states pt will return to the hospital if suicidal feelings return.    Hospital Routine Meds:     Hospital PRN Meds:    Discharge Meds:    Discharge Medication List as of 2/24/2023 11:57 AM             Details   paliperidone palmitate ER (INVEGA SUSTENNA) 234 MG/1.5ML RALF IM injection Inject 234 mg into the muscle once for 1 dose, Disp-1 each, R-0Print                 Disposition - Residence Home     Follow Up:  See Discharge Duc Tamayo MD  Physician Psychiatry

## 2023-03-02 ENCOUNTER — TELEPHONE (OUTPATIENT)
Dept: FAMILY MEDICINE CLINIC | Age: 23
End: 2023-03-02

## 2023-03-02 NOTE — TELEPHONE ENCOUNTER
Patient calling stepped on a used needle outside of her apartment. Per Dr. Kessler Nail recommendation patient to go to urgent care for cleaning, possible labs and tetanus vaccine.

## 2023-03-10 ENCOUNTER — OFFICE VISIT (OUTPATIENT)
Dept: URGENT CARE | Age: 23
End: 2023-03-10

## 2023-03-10 VITALS
BODY MASS INDEX: 40.02 KG/M2 | SYSTOLIC BLOOD PRESSURE: 125 MMHG | TEMPERATURE: 98.6 F | DIASTOLIC BLOOD PRESSURE: 79 MMHG | HEART RATE: 93 BPM | WEIGHT: 255 LBS | OXYGEN SATURATION: 98 % | HEIGHT: 67 IN

## 2023-03-10 DIAGNOSIS — F50.2 BULIMIA: Primary | ICD-10-CM

## 2023-03-10 RX ORDER — OMEPRAZOLE 20 MG/1
20 CAPSULE, DELAYED RELEASE ORAL
Qty: 28 CAPSULE | Refills: 0 | Status: SHIPPED | OUTPATIENT
Start: 2023-03-10 | End: 2023-03-24

## 2023-03-10 RX ORDER — EMTRICITABINE AND TENOFOVIR DISOPROXIL FUMARATE 200; 300 MG/1; MG/1
1 TABLET, FILM COATED ORAL
COMMUNITY
Start: 2023-03-02

## 2023-03-10 RX ORDER — RALTEGRAVIR 400 MG/1
1 TABLET, FILM COATED ORAL
COMMUNITY
Start: 2023-03-03

## 2023-03-10 ASSESSMENT — ENCOUNTER SYMPTOMS
DIARRHEA: 0
VOMITING: 1
CONSTIPATION: 0
BLOOD IN STOOL: 0
RESPIRATORY NEGATIVE: 1
ANAL BLEEDING: 0
ABDOMINAL DISTENTION: 0
RECTAL PAIN: 0
ABDOMINAL PAIN: 0
NAUSEA: 0

## 2023-03-10 NOTE — PROGRESS NOTES
Nirmala Piedra (:  2000) is a 23 y.o. adult,New patient, here for evaluation of the following chief complaint(s):  Emesis and Hematemesis (At approx 6 pm)      ASSESSMENT/PLAN:    ICD-10-CM    1. Bulimia  F50.2 omeprazole (PRILOSEC) 20 MG delayed release capsule        Patient in no acute distress  Discussed at length the self induced vomiting can cause sever abdominal complications such as bleeding ulcer  Discussed the need to stop further self induced vomiting due to risk of GI bleed  Will start omeprazole  Instructed to go to ER if any further hematemesis  GO to ER if develops abdominal pain   Follow up with psychiatry as scheduled   Return if symptoms worsen or fail to improve. SUBJECTIVE/OBJECTIVE:  23year old transgender person with vomiting up blood today. States that they are bulimic and vomited 4 times today - self inflicted vomiting due to bulimia. States they they self induce vomiting daily 2- 6 times. Has been doing this on and off for a few years. Has therpist and psychiatrist that he sees for eating disorder. Denies abdominal pain, has sore throat. States blood was bright red. Has been eating today without pain. Has had fast food thus far today. Has psychiatric h/o bipolar. Denies any suicidal ideation. Has not had recent change in medications. History provided by:  Patient   used: No      Vitals:    03/10/23 1830   BP: 125/79   Pulse: 93   Temp: 98.6 °F (37 °C)   SpO2: 98%   Weight: 255 lb (115.7 kg)   Height: 5' 7\" (1.702 m)       Review of Systems   Constitutional: Negative. HENT: Negative. Respiratory: Negative. Cardiovascular: Negative. Gastrointestinal:  Positive for vomiting. Negative for abdominal distention, abdominal pain, anal bleeding, blood in stool, constipation, diarrhea, nausea and rectal pain. Self induced vomiting    Psychiatric/Behavioral:  The patient is nervous/anxious. Physical Exam  Vitals reviewed. Constitutional:       General: He is not in acute distress. Appearance: He is obese. He is not ill-appearing. HENT:      Head: Normocephalic. Cardiovascular:      Rate and Rhythm: Normal rate and regular rhythm. Pulses: Normal pulses. Heart sounds: Normal heart sounds. No murmur heard. Pulmonary:      Effort: Pulmonary effort is normal.      Breath sounds: Normal breath sounds. Abdominal:      General: Bowel sounds are normal. There is no distension. Palpations: Abdomen is soft. There is no mass. Tenderness: There is no abdominal tenderness. There is no right CVA tenderness, left CVA tenderness, guarding or rebound. Hernia: No hernia is present. Skin:     General: Skin is warm and dry. Neurological:      Mental Status: He is alert and oriented to person, place, and time. An electronic signature was used to authenticate this note.     --SONIA Kirby - CNP

## 2023-03-10 NOTE — PATIENT INSTRUCTIONS
Go to ER immediately if develop continued vomiting blood, severe abdominal pain, blood in stool  Follow up with psychiatry regarding bulimia

## 2023-03-20 NOTE — PROGRESS NOTES
presence of Dr. Benna Osler M.D. By Beverley Almaraz RN    The scribes documentation has been prepared under my direction and personally reviewed by me in its entirety. I confirm that the note above accurately reflects all work, treatment, procedures, and medical decision making performed by me. Dr. Benna Osler, MD      Thank you for allowing me to participate in the care of this individual.      Danielle Vargas.  Carolyn Weinstein M.D., [facility]

## 2023-03-21 ENCOUNTER — OFFICE VISIT (OUTPATIENT)
Dept: CARDIOLOGY CLINIC | Age: 23
End: 2023-03-21
Payer: COMMERCIAL

## 2023-03-21 ENCOUNTER — TELEPHONE (OUTPATIENT)
Dept: CARDIOLOGY CLINIC | Age: 23
End: 2023-03-21

## 2023-03-21 VITALS
HEART RATE: 80 BPM | HEIGHT: 67 IN | BODY MASS INDEX: 39.39 KG/M2 | OXYGEN SATURATION: 100 % | SYSTOLIC BLOOD PRESSURE: 132 MMHG | WEIGHT: 251 LBS | DIASTOLIC BLOOD PRESSURE: 81 MMHG

## 2023-03-21 DIAGNOSIS — R00.2 PALPITATION: ICD-10-CM

## 2023-03-21 DIAGNOSIS — R42 DIZZINESS: ICD-10-CM

## 2023-03-21 DIAGNOSIS — Z76.89 ESTABLISHING CARE WITH NEW DOCTOR, ENCOUNTER FOR: Primary | ICD-10-CM

## 2023-03-21 DIAGNOSIS — R94.31 ABNORMAL EKG: ICD-10-CM

## 2023-03-21 DIAGNOSIS — R55 NEAR SYNCOPE: ICD-10-CM

## 2023-03-21 DIAGNOSIS — R00.2 PALPITATIONS: ICD-10-CM

## 2023-03-21 PROCEDURE — 93000 ELECTROCARDIOGRAM COMPLETE: CPT | Performed by: INTERNAL MEDICINE

## 2023-03-21 PROCEDURE — 99204 OFFICE O/P NEW MOD 45 MIN: CPT | Performed by: INTERNAL MEDICINE

## 2023-03-21 RX ORDER — PRAZOSIN HYDROCHLORIDE 2 MG/1
2 CAPSULE ORAL NIGHTLY
COMMUNITY

## 2023-03-21 NOTE — TELEPHONE ENCOUNTER
Monitor placed by To be mailed to pt home 3/21/2023  [address]  Length of monitor 28 day  Monitor ordered by [address]  Serial number   Kit ID   Activation successful prior to pt leaving office?  NA

## 2023-03-21 NOTE — PATIENT INSTRUCTIONS
rosalia:  ~Cardiac event monitor for 4 weeks   ~Recommend an echocardiogram which is an ultrasound of your heart to evaluate heart function, structures and valves. ~Recommend obtaining knee high compression stockings from a medical supply store. You can get the lowest compression since these can be hard to apply if they are too tight. Wear these during the day and take off at night. ~stay well hydrated with water   Cardiac medications reviewed including indications and pertinent side effects.  Medication list updated at this visit.   ~Obtain an upper arm blood pressure cuff- Check blood pressure and heart rate at home daily and with symptoms -keep a log with dates and times and bring to office visit   Regular exercise and following a healthy diet encouraged   Follow up with me in 8 weeks

## 2023-03-31 ENCOUNTER — PROCEDURE VISIT (OUTPATIENT)
Dept: CARDIOLOGY CLINIC | Age: 23
End: 2023-03-31

## 2023-03-31 ENCOUNTER — TELEPHONE (OUTPATIENT)
Dept: CARDIOLOGY CLINIC | Age: 23
End: 2023-03-31

## 2023-03-31 DIAGNOSIS — R55 NEAR SYNCOPE: ICD-10-CM

## 2023-03-31 DIAGNOSIS — R00.2 PALPITATIONS: ICD-10-CM

## 2023-03-31 DIAGNOSIS — R42 DIZZINESS: ICD-10-CM

## 2023-03-31 DIAGNOSIS — R94.31 ABNORMAL EKG: ICD-10-CM

## 2023-03-31 LAB
LV EF: 58 %
LVEF MODALITY: NORMAL

## 2023-03-31 NOTE — TELEPHONE ENCOUNTER
----- Message from Joel Crowder MD sent at 3/31/2023  3:35 PM EDT -----  Please notify patient that their echo looks good  Normal heart fxn  Will review in detail at [facility]

## 2023-04-08 ENCOUNTER — OFFICE VISIT (OUTPATIENT)
Dept: URGENT CARE | Age: 23
End: 2023-04-08

## 2023-04-08 VITALS
DIASTOLIC BLOOD PRESSURE: 54 MMHG | SYSTOLIC BLOOD PRESSURE: 80 MMHG | TEMPERATURE: 97.8 F | BODY MASS INDEX: 38.45 KG/M2 | OXYGEN SATURATION: 99 % | HEART RATE: 71 BPM | WEIGHT: 245 LBS | HEIGHT: 67 IN

## 2023-04-08 DIAGNOSIS — I95.1 ORTHOSTATIC HYPOTENSION: Primary | ICD-10-CM

## 2023-04-08 DIAGNOSIS — R55 SYNCOPE, UNSPECIFIED SYNCOPE TYPE: ICD-10-CM

## 2023-04-08 RX ORDER — FLUOXETINE HYDROCHLORIDE 20 MG/1
20 CAPSULE ORAL DAILY
COMMUNITY

## 2023-04-08 NOTE — PROGRESS NOTES
Nirmala Piedra (:  2000) is a 23 y.o. adult,New patient, here for evaluation of the following chief complaint(s): Other (Passed out yesterday-dizziness felt before going down--episodes have happened 3x before within last 3months. Seen at [facility] after yesterdays episode. Dizziness and blackspots seen twice today)      ASSESSMENT/PLAN:    ICD-10-CM    1. Orthostatic hypotension  I95.1       2. Syncope, unspecified syncope type  R55         Sitting blood pressure 94/ 60  Standing blood pressure 88/54  Suspect orthostatic hypotension secondary to ongoing bulmia  Referred to go to ER for further monitoring and evaluation  Discussed with patient the health risks of ongoing bulmia  Patient's grandfather drove him here and wishes to have him to transport him to ER  Patient left clinic with grandparent, alert and oriented and in no acute distress      SUBJECTIVE/OBJECTIVE:  23year old identifies as male. Patient was found passed out at home yesterday evening and an ambulance was called. Went to [facility] and an EKG was done that was WNL per patient and was told to go home and call his doctor. He did not call doctor as of yet. Is here today due to continues to feel dizzy and fell this morning. Did not pass out today. Has been seen by cardiology and is currently on a halter monitor for syncope issues in the past 3 months. Has been told he have PVCS on EKG in the past.   Patient reports h/o self harm and bulmia. Reports bulmia has been going on for the past 4 months - has been vomiting at least 2-4 times daily for 4 months. Patient is vague and unclear if current treatment for bulmia and if has discussed bulmia with cardiology and ER where seen yesterday.   Blood pressure low here in clinic today      History provided by:  Patient   used: No      Vitals:    23 1456 23 1506 23 1601 23 1604   BP: 101/69 (!) 88/55 94/60 (!) 80/54   Site: Left Upper Arm  Left Upper Arm

## 2023-04-09 ASSESSMENT — ENCOUNTER SYMPTOMS
GASTROINTESTINAL NEGATIVE: 1
RESPIRATORY NEGATIVE: 1

## 2023-04-10 ENCOUNTER — OFFICE VISIT (OUTPATIENT)
Dept: FAMILY MEDICINE CLINIC | Age: 23
End: 2023-04-10
Payer: COMMERCIAL

## 2023-04-10 VITALS
RESPIRATION RATE: 16 BRPM | WEIGHT: 245 LBS | HEART RATE: 74 BPM | BODY MASS INDEX: 38.37 KG/M2 | SYSTOLIC BLOOD PRESSURE: 110 MMHG | OXYGEN SATURATION: 98 % | TEMPERATURE: 97.3 F | DIASTOLIC BLOOD PRESSURE: 78 MMHG

## 2023-04-10 DIAGNOSIS — R55 SYNCOPE AND COLLAPSE: ICD-10-CM

## 2023-04-10 DIAGNOSIS — R31.9 URINARY TRACT INFECTION WITH HEMATURIA, SITE UNSPECIFIED: Primary | ICD-10-CM

## 2023-04-10 DIAGNOSIS — F50.2 BULIMIA: ICD-10-CM

## 2023-04-10 DIAGNOSIS — F20.9 SCHIZOPHRENIA, UNSPECIFIED TYPE (HCC): ICD-10-CM

## 2023-04-10 DIAGNOSIS — N39.0 URINARY TRACT INFECTION WITH HEMATURIA, SITE UNSPECIFIED: Primary | ICD-10-CM

## 2023-04-10 LAB
ALBUMIN SERPL-MCNC: 4.6 G/DL (ref 3.4–5)
ALBUMIN/GLOB SERPL: 2 {RATIO} (ref 1.1–2.2)
ALP SERPL-CCNC: 64 U/L (ref 40–129)
ALT SERPL-CCNC: 13 U/L (ref 10–40)
ANION GAP SERPL CALCULATED.3IONS-SCNC: 15 MMOL/L (ref 3–16)
AST SERPL-CCNC: 12 U/L (ref 15–37)
BASOPHILS # BLD: 0 K/UL (ref 0–0.2)
BASOPHILS NFR BLD: 1.3 %
BILIRUB DIRECT SERPL-MCNC: <0.2 MG/DL (ref 0–0.3)
BILIRUB INDIRECT SERPL-MCNC: ABNORMAL MG/DL (ref 0–1)
BILIRUB SERPL-MCNC: 0.5 MG/DL (ref 0–1)
BILIRUBIN, POC: NEGATIVE
BLOOD URINE, POC: NORMAL
BUN SERPL-MCNC: 8 MG/DL (ref 7–20)
CALCIUM SERPL-MCNC: 9.3 MG/DL (ref 8.3–10.6)
CHLORIDE SERPL-SCNC: 105 MMOL/L (ref 99–110)
CLARITY, POC: NORMAL
CO2 SERPL-SCNC: 22 MMOL/L (ref 21–32)
COLOR, POC: NORMAL
CREAT SERPL-MCNC: 0.8 MG/DL (ref 0.6–1.1)
DEPRECATED RDW RBC AUTO: 13.4 % (ref 12.4–15.4)
EOSINOPHIL # BLD: 0.1 K/UL (ref 0–0.6)
EOSINOPHIL NFR BLD: 2.1 %
GFR SERPLBLD CREATININE-BSD FMLA CKD-EPI: >60 ML/MIN/{1.73_M2}
GLUCOSE SERPL-MCNC: 102 MG/DL (ref 70–99)
GLUCOSE URINE, POC: NEGATIVE
HCT VFR BLD AUTO: 38.7 % (ref 36–48)
HGB BLD-MCNC: 13.3 G/DL (ref 12–16)
KETONES, POC: NEGATIVE
LEUKOCYTE EST, POC: NEGATIVE
LYMPHOCYTES # BLD: 1 K/UL (ref 1–5.1)
LYMPHOCYTES NFR BLD: 26.2 %
MCH RBC QN AUTO: 30 PG (ref 26–34)
MCHC RBC AUTO-ENTMCNC: 34.2 G/DL (ref 31–36)
MCV RBC AUTO: 87.5 FL (ref 80–100)
MONOCYTES # BLD: 0.3 K/UL (ref 0–1.3)
MONOCYTES NFR BLD: 7.5 %
NEUTROPHILS # BLD: 2.4 K/UL (ref 1.7–7.7)
NEUTROPHILS NFR BLD: 62.9 %
NITRITE, POC: NEGATIVE
PH, POC: 6
PLATELET # BLD AUTO: 297 K/UL (ref 135–450)
PMV BLD AUTO: 7.7 FL (ref 5–10.5)
POTASSIUM SERPL-SCNC: 4.3 MMOL/L (ref 3.5–5.1)
PROT SERPL-MCNC: 6.9 G/DL (ref 6.4–8.2)
PROTEIN, POC: NEGATIVE
RBC # BLD AUTO: 4.43 M/UL (ref 4–5.2)
SODIUM SERPL-SCNC: 142 MMOL/L (ref 136–145)
SPECIFIC GRAVITY, POC: 1.02
UROBILINOGEN, POC: NORMAL
WBC # BLD AUTO: 3.8 K/UL (ref 4–11)

## 2023-04-10 PROCEDURE — 99214 OFFICE O/P EST MOD 30 MIN: CPT | Performed by: NURSE PRACTITIONER

## 2023-04-10 PROCEDURE — 93000 ELECTROCARDIOGRAM COMPLETE: CPT | Performed by: NURSE PRACTITIONER

## 2023-04-10 PROCEDURE — 81003 URINALYSIS AUTO W/O SCOPE: CPT | Performed by: NURSE PRACTITIONER

## 2023-04-10 SDOH — ECONOMIC STABILITY: FOOD INSECURITY: WITHIN THE PAST 12 MONTHS, YOU WORRIED THAT YOUR FOOD WOULD RUN OUT BEFORE YOU GOT MONEY TO BUY MORE.: NEVER TRUE

## 2023-04-10 SDOH — ECONOMIC STABILITY: INCOME INSECURITY: HOW HARD IS IT FOR YOU TO PAY FOR THE VERY BASICS LIKE FOOD, HOUSING, MEDICAL CARE, AND HEATING?: NOT HARD AT ALL

## 2023-04-10 SDOH — ECONOMIC STABILITY: FOOD INSECURITY: WITHIN THE PAST 12 MONTHS, THE FOOD YOU BOUGHT JUST DIDN'T LAST AND YOU DIDN'T HAVE MONEY TO GET MORE.: NEVER TRUE

## 2023-04-10 ASSESSMENT — ANXIETY QUESTIONNAIRES
GAD7 TOTAL SCORE: 0
5. BEING SO RESTLESS THAT IT IS HARD TO SIT STILL: 0
7. FEELING AFRAID AS IF SOMETHING AWFUL MIGHT HAPPEN: 0
4. TROUBLE RELAXING: 0
6. BECOMING EASILY ANNOYED OR IRRITABLE: 0
1. FEELING NERVOUS, ANXIOUS, OR ON EDGE: 0
2. NOT BEING ABLE TO STOP OR CONTROL WORRYING: 0
3. WORRYING TOO MUCH ABOUT DIFFERENT THINGS: 0
IF YOU CHECKED OFF ANY PROBLEMS ON THIS QUESTIONNAIRE, HOW DIFFICULT HAVE THESE PROBLEMS MADE IT FOR YOU TO DO YOUR WORK, TAKE CARE OF THINGS AT HOME, OR GET ALONG WITH OTHER PEOPLE: NOT DIFFICULT AT ALL

## 2023-04-10 ASSESSMENT — PATIENT HEALTH QUESTIONNAIRE - PHQ9
SUM OF ALL RESPONSES TO PHQ QUESTIONS 1-9: 0
10. IF YOU CHECKED OFF ANY PROBLEMS, HOW DIFFICULT HAVE THESE PROBLEMS MADE IT FOR YOU TO DO YOUR WORK, TAKE CARE OF THINGS AT HOME, OR GET ALONG WITH OTHER PEOPLE: 0
1. LITTLE INTEREST OR PLEASURE IN DOING THINGS: 0
SUM OF ALL RESPONSES TO PHQ9 QUESTIONS 1 & 2: 0
5. POOR APPETITE OR OVEREATING: 0
SUM OF ALL RESPONSES TO PHQ QUESTIONS 1-9: 0
4. FEELING TIRED OR HAVING LITTLE ENERGY: 0
3. TROUBLE FALLING OR STAYING ASLEEP: 0
SUM OF ALL RESPONSES TO PHQ QUESTIONS 1-9: 0
9. THOUGHTS THAT YOU WOULD BE BETTER OFF DEAD, OR OF HURTING YOURSELF: 0
2. FEELING DOWN, DEPRESSED OR HOPELESS: 0
6. FEELING BAD ABOUT YOURSELF - OR THAT YOU ARE A FAILURE OR HAVE LET YOURSELF OR YOUR FAMILY DOWN: 0
8. MOVING OR SPEAKING SO SLOWLY THAT OTHER PEOPLE COULD HAVE NOTICED. OR THE OPPOSITE, BEING SO FIGETY OR RESTLESS THAT YOU HAVE BEEN MOVING AROUND A LOT MORE THAN USUAL: 0
7. TROUBLE CONCENTRATING ON THINGS, SUCH AS READING THE NEWSPAPER OR WATCHING TELEVISION: 0
SUM OF ALL RESPONSES TO PHQ QUESTIONS 1-9: 0

## 2023-04-11 LAB — BACTERIA UR CULT: NORMAL

## 2023-04-21 ENCOUNTER — HOSPITAL ENCOUNTER (EMERGENCY)
Age: 23
Discharge: HOME | End: 2023-04-21
Payer: COMMERCIAL

## 2023-04-21 VITALS
BODY MASS INDEX: 39.24 KG/M2 | HEART RATE: 42 BPM | DIASTOLIC BLOOD PRESSURE: 54 MMHG | OXYGEN SATURATION: 100 % | TEMPERATURE: 97.6 F | HEIGHT: 67 IN | RESPIRATION RATE: 16 BRPM | SYSTOLIC BLOOD PRESSURE: 97 MMHG | WEIGHT: 250 LBS

## 2023-04-21 DIAGNOSIS — I95.1 ORTHOSTATIC HYPOTENSION: ICD-10-CM

## 2023-04-21 DIAGNOSIS — R55 SYNCOPE AND COLLAPSE: Primary | ICD-10-CM

## 2023-04-21 LAB
ALBUMIN SERPL-MCNC: 4.3 G/DL (ref 3.4–5)
ALBUMIN/GLOB SERPL: 1.7 {RATIO} (ref 1.1–2.2)
ALP SERPL-CCNC: 64 U/L (ref 40–129)
ALT SERPL-CCNC: 12 U/L (ref 10–40)
ANION GAP SERPL CALCULATED.3IONS-SCNC: 16 MMOL/L (ref 3–16)
AST SERPL-CCNC: 14 U/L (ref 15–37)
B-HCG SERPL EIA 3RD IS-ACNC: <5 MIU/ML
BASOPHILS # BLD: 0.1 K/UL (ref 0–0.2)
BASOPHILS NFR BLD: 1.2 %
BILIRUB SERPL-MCNC: 0.6 MG/DL (ref 0–1)
BUN SERPL-MCNC: 6 MG/DL (ref 7–20)
CALCIUM SERPL-MCNC: 9.1 MG/DL (ref 8.3–10.6)
CHLORIDE SERPL-SCNC: 98 MMOL/L (ref 99–110)
CO2 SERPL-SCNC: 22 MMOL/L (ref 21–32)
CREAT SERPL-MCNC: 0.6 MG/DL (ref 0.6–1.1)
DEPRECATED RDW RBC AUTO: 13.2 % (ref 12.4–15.4)
EOSINOPHIL # BLD: 0.1 K/UL (ref 0–0.6)
EOSINOPHIL NFR BLD: 2.1 %
GFR SERPLBLD CREATININE-BSD FMLA CKD-EPI: >60 ML/MIN/{1.73_M2}
GLUCOSE SERPL-MCNC: 101 MG/DL (ref 70–99)
HCT VFR BLD AUTO: 38 % (ref 36–48)
HGB BLD-MCNC: 12.7 G/DL (ref 12–16)
LYMPHOCYTES # BLD: 1.5 K/UL (ref 1–5.1)
LYMPHOCYTES NFR BLD: 31.1 %
MCH RBC QN AUTO: 29.6 PG (ref 26–34)
MCHC RBC AUTO-ENTMCNC: 33.4 G/DL (ref 31–36)
MCV RBC AUTO: 88.8 FL (ref 80–100)
MONOCYTES # BLD: 0.4 K/UL (ref 0–1.3)
MONOCYTES NFR BLD: 8.1 %
NEUTROPHILS # BLD: 2.8 K/UL (ref 1.7–7.7)
NEUTROPHILS NFR BLD: 57.5 %
PLATELET # BLD AUTO: 305 K/UL (ref 135–450)
PMV BLD AUTO: 7.7 FL (ref 5–10.5)
POTASSIUM SERPL-SCNC: 4.1 MMOL/L (ref 3.5–5.1)
PROT SERPL-MCNC: 6.8 G/DL (ref 6.4–8.2)
RBC # BLD AUTO: 4.28 M/UL (ref 4–5.2)
SODIUM SERPL-SCNC: 136 MMOL/L (ref 136–145)
SPECIMEN STATUS: NORMAL
WBC # BLD AUTO: 4.9 K/UL (ref 4–11)

## 2023-04-21 PROCEDURE — 2580000003 HC RX 258: Performed by: PHYSICIAN ASSISTANT

## 2023-04-21 PROCEDURE — 99284 EMERGENCY DEPT VISIT MOD MDM: CPT

## 2023-04-21 PROCEDURE — 84702 CHORIONIC GONADOTROPIN TEST: CPT

## 2023-04-21 PROCEDURE — 85025 COMPLETE CBC W/AUTO DIFF WBC: CPT

## 2023-04-21 PROCEDURE — 80053 COMPREHEN METABOLIC PANEL: CPT

## 2023-04-21 RX ORDER — 0.9 % SODIUM CHLORIDE 0.9 %
1000 INTRAVENOUS SOLUTION INTRAVENOUS ONCE
Status: COMPLETED | OUTPATIENT
Start: 2023-04-21 | End: 2023-04-21

## 2023-04-21 RX ADMIN — SODIUM CHLORIDE 1000 ML: 9 INJECTION, SOLUTION INTRAVENOUS at 12:21

## 2023-04-21 ASSESSMENT — PAIN - FUNCTIONAL ASSESSMENT: PAIN_FUNCTIONAL_ASSESSMENT: 0-10

## 2023-04-21 ASSESSMENT — PAIN SCALES - GENERAL: PAINLEVEL_OUTOF10: 0

## 2023-04-21 NOTE — ED PROVIDER NOTES
K/uL    MPV 7.7 5.0 - 10.5 fL    Neutrophils % 57.5 %    Lymphocytes % 31.1 %    Monocytes % 8.1 %    Eosinophils % 2.1 %    Basophils % 1.2 %    Neutrophils Absolute 2.8 1.7 - 7.7 K/uL    Lymphocytes Absolute 1.5 1.0 - 5.1 K/uL    Monocytes Absolute 0.4 0.0 - 1.3 K/uL    Eosinophils Absolute 0.1 0.0 - 0.6 K/uL    Basophils Absolute 0.1 0.0 - 0.2 K/uL   Comprehensive Metabolic Panel w/ Reflex to MG   Result Value Ref Range    Sodium 136 136 - 145 mmol/L    Potassium reflex Magnesium 4.1 3.5 - 5.1 mmol/L    Chloride 98 (L) 99 - 110 mmol/L    CO2 22 21 - 32 mmol/L    Anion Gap 16 3 - 16    Glucose 101 (H) 70 - 99 mg/dL    BUN 6 (L) 7 - 20 mg/dL    Creatinine 0.6 0.6 - 1.1 mg/dL    Est, Glom Filt Rate >60 >60    Calcium 9.1 8.3 - 10.6 mg/dL    Total Protein 6.8 6.4 - 8.2 g/dL    Albumin 4.3 3.4 - 5.0 g/dL    Albumin/Globulin Ratio 1.7 1.1 - 2.2    Total Bilirubin 0.6 0.0 - 1.0 mg/dL    Alkaline Phosphatase 64 40 - 129 U/L    ALT 12 10 - 40 U/L    AST 14 (L) 15 - 37 U/L   Sample possible blood bank testing   Result Value Ref Range    Specimen Status ABHIJEET    HCG, Quantitative, Pregnancy   Result Value Ref Range    hCG Quant <5.0 <5.0 mIU/mL         EKG: The Ekg interpreted by me in the absence of a cardiologist shows. Sinus rhythm. No evidence of acute ischemia. See also interpretation by ED attending physician      PROCEDURES:   N/A      CRITICAL CARE TIME:     None        EMERGENCY DEPARTMENT COURSE and DIFFERENTIAL DIAGNOSIS/MDM:   Vitals:    Vitals:    04/21/23 1215 04/21/23 1337 04/21/23 1354 04/21/23 1415   BP:   (!) 98/58 (!) 97/54   Pulse:   (!) 45 (!) 42   Resp: 14 15 15 16   Temp:       TempSrc:       SpO2: 100% 100% 100% 100%   Weight:       Height:           Patient was given the following medications:  Medications   0.9 % sodium chloride bolus (0 mLs IntraVENous Stopped 4/21/23 1321)        CC/HPI Summary, DDx, ED course, and Reassessment: Patient comes in after having 2 syncopal episodes at home.

## 2023-04-28 ASSESSMENT — ENCOUNTER SYMPTOMS
RESPIRATORY NEGATIVE: 1
SHORTNESS OF BREATH: 0
WHEEZING: 0

## 2023-04-28 NOTE — PROGRESS NOTES
Subjective:      Chief Complaint   Patient presents with    Hematuria     Started 2 days is new and only symptom    Loss of Consciousness     Was in ed for it, then went to urgent care due to it and b/p was 80/54 when standing first time was Friday evening       Patient ID: Nirmala Piedra is a 23 y.o. adult patient of [facility] Who presents for follow up from ED visit for  syncope,hypotension and hematuria on 4/7/2023. PMH: Pre-Diabetes, schizophrenia, bulimia. Pacer relates she was feeling dizzy and lightheaded when she was out walking and passed out, lost consciousness on the sidewalk. Her grandfather came out and took her to urgent care where she was evaluated and discharged. EKG at that time showed normal sinus rhythm no ectopy. In February of this year she had a full cardiac work-up which was negative. Blood pressure at urgent care was 80/40? Today she is 110/78. Her urine dip was also negative for any infection and only had a trace amount of blood. Culture will be sent. She has been seen at the eating disorder clinic at [facility] in the past for her bulimia. She tells me she vomits 2-4 times daily and its been going on like this for the past 2 months.     HPI see above    Family History   Problem Relation Age of Onset    Diabetes Mother     Diabetes Father     Asthma Father        Social History     Socioeconomic History    Marital status: Single     Spouse name: Not on file    Number of children: 0    Years of education: 14    Highest education level: Not on file   Occupational History    Not on file   Tobacco Use    Smoking status: Never    Smokeless tobacco: Never   Vaping Use    Vaping Use: Never used   Substance and Sexual Activity    Alcohol use: Not Currently     Alcohol/week: 28.0 standard drinks     Types: 28 Shots of liquor per week    Drug use: No    Sexual activity: Not Currently   Other Topics Concern    Not on file   Social History Narrative    Not on file     Social

## 2023-04-28 NOTE — PATIENT INSTRUCTIONS
Dizziness lightheadedness, syncope-may be related to eating disorder.   Refer to [facility]

## 2023-05-03 PROCEDURE — 93272 ECG/REVIEW INTERPRET ONLY: CPT | Performed by: INTERNAL MEDICINE

## 2023-05-10 ENCOUNTER — TELEPHONE (OUTPATIENT)
Dept: CARDIOLOGY CLINIC | Age: 23
End: 2023-05-10

## 2023-05-10 DIAGNOSIS — R55 NEAR SYNCOPE: ICD-10-CM

## 2023-05-10 DIAGNOSIS — R42 DIZZINESS: ICD-10-CM

## 2023-05-10 DIAGNOSIS — R00.2 PALPITATIONS: ICD-10-CM

## 2023-05-10 NOTE — TELEPHONE ENCOUNTER
Patient monitor results have been scanned into epic and be read under the \"media\" and \"cardiology\" tabs. ressults also routed to the ordering providers.   Thank you

## 2023-05-11 NOTE — TELEPHONE ENCOUNTER
[facility] requesting a call back to the office. Please relay [facility] message regarding monitor results:  Monitor shows some acc slow HRs and extr hear beats  Will review and discuss at f/u OV    Confirm 5/19 [address] upcoming appt.

## 2023-05-19 ENCOUNTER — OFFICE VISIT (OUTPATIENT)
Dept: CARDIOLOGY CLINIC | Age: 23
End: 2023-05-19
Payer: COMMERCIAL

## 2023-05-19 VITALS
BODY MASS INDEX: 37.67 KG/M2 | WEIGHT: 240 LBS | DIASTOLIC BLOOD PRESSURE: 72 MMHG | SYSTOLIC BLOOD PRESSURE: 108 MMHG | OXYGEN SATURATION: 98 % | HEIGHT: 67 IN | HEART RATE: 81 BPM

## 2023-05-19 DIAGNOSIS — R00.1 BRADYCARDIA: ICD-10-CM

## 2023-05-19 DIAGNOSIS — R55 NEAR SYNCOPE: ICD-10-CM

## 2023-05-19 DIAGNOSIS — R42 DIZZINESS: Primary | ICD-10-CM

## 2023-05-19 PROCEDURE — 99214 OFFICE O/P EST MOD 30 MIN: CPT | Performed by: INTERNAL MEDICINE

## 2023-05-19 RX ORDER — MIDODRINE HYDROCHLORIDE 5 MG/1
5 TABLET ORAL 3 TIMES DAILY
Qty: 90 TABLET | Refills: 5 | Status: SHIPPED | OUTPATIENT
Start: 2023-05-19

## 2023-05-19 RX ORDER — OMEPRAZOLE 20 MG/1
20 CAPSULE, DELAYED RELEASE ORAL DAILY
COMMUNITY

## 2023-05-19 NOTE — PROGRESS NOTES
[address]   Cardiac Consultation    Referring Provider:  Sharen Moritz, APRN - CNP     Chief Complaint   Patient presents with    Follow-up      Nirmala Piedra   2000    History of Present Illness:    Nirmala Piedra is a 23 y.o. adult who is here today   for follow up and to review testing. He was initially seen as a new patient consult at the request of Sharen Moritz, APRN - CNP for palpitations and syncope. He has a past medical history of bulimia and bipolar disorder. He presented to the ER twice due to dizziness and slurred speech. Holter monitor showed Normal sinus rhythm- Frequent PVCs- Bigeminy present with no symptoms. CTA head and neck negative. Last OV- he states he has been diagnosed with type 2 diabetes. He states he does not smoke or drink alcohol. No over the counter supplements. Drinks one monster a day. He states he went to the ER twice at [facility] a few months ago at [facility] due slurred speech and aphasia, felt like he was drunk. He had another episode of near syncope in the ER while in the bathroom giving a urine sample. Episodes start with feeling dizzy, hearing loss, and light headed, last episode 2 weeks ago. He states he had gone for a walk at his apartment complex and started to feel drunk, hearing loss and slurred speech. He went home and layed down for several hours and his symptoms finally resolved. States this episode lasted for 6 hours. Overall symptoms started 2 months ago. He has had 3 episodes in the last few months. No sig change. No ppt factor other than the episode in ER. Resolves w/ rest. He has checked his heart rate during these times and notes his heart rate being fast then dropping into the 30's. He has some dizziness and feeling light headed with standing. 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 Patient currently denies any weight gain, edema, chest pain, shortness of breath, or actual syncope.    Cardiac event monitor 3/27-4/24/2023-

## 2023-05-19 NOTE — PATIENT INSTRUCTIONS
Plan:  ~Referrals to sleep medicine   Start Midodrine 5 mg tree times a day- monitor blood pressure daily. Call if blood pressure is running over 140/90. Cardiac medications reviewed including indications and pertinent side effects. Medication list updated at this visit.    Check blood pressure and heart rate at home a few times per week- keep a log with dates and times and bring to office visit   Regular exercise and following a healthy diet encouraged   Follow up with me in 3 months

## 2023-05-25 NOTE — PROGRESS NOTES
It is unclear at this point, would continue to monitor.   Pt tolerates injection in right deltoid well copy of rabies vaccine record given to patient discharged to home in stable condition

## 2023-05-26 ENCOUNTER — OFFICE VISIT (OUTPATIENT)
Dept: PULMONOLOGY | Age: 23
End: 2023-05-26
Payer: COMMERCIAL

## 2023-05-26 VITALS
HEIGHT: 67 IN | RESPIRATION RATE: 16 BRPM | BODY MASS INDEX: 37.98 KG/M2 | OXYGEN SATURATION: 98 % | DIASTOLIC BLOOD PRESSURE: 70 MMHG | WEIGHT: 242 LBS | SYSTOLIC BLOOD PRESSURE: 116 MMHG | HEART RATE: 80 BPM

## 2023-05-26 DIAGNOSIS — R00.2 PALPITATIONS: ICD-10-CM

## 2023-05-26 DIAGNOSIS — G25.81 RLS (RESTLESS LEGS SYNDROME): ICD-10-CM

## 2023-05-26 DIAGNOSIS — G47.33 OSA (OBSTRUCTIVE SLEEP APNEA): Primary | ICD-10-CM

## 2023-05-26 DIAGNOSIS — F45.8 BRUXISM: ICD-10-CM

## 2023-05-26 DIAGNOSIS — Z78.9 FEMALE-TO-MALE TRANSGENDER PERSON: ICD-10-CM

## 2023-05-26 DIAGNOSIS — E66.01 CLASS 2 SEVERE OBESITY DUE TO EXCESS CALORIES WITH SERIOUS COMORBIDITY AND BODY MASS INDEX (BMI) OF 37.0 TO 37.9 IN ADULT (HCC): ICD-10-CM

## 2023-05-26 PROBLEM — E78.1 HYPERTRIGLYCERIDEMIA: Status: ACTIVE | Noted: 2019-05-06

## 2023-05-26 PROBLEM — F80.9 LANGUAGE IMPAIRMENT: Status: ACTIVE | Noted: 2018-02-26

## 2023-05-26 PROBLEM — R94.31 PROLONGED Q-T INTERVAL ON ECG: Status: ACTIVE | Noted: 2022-05-04

## 2023-05-26 PROBLEM — E11.9 TYPE 2 DIABETES MELLITUS, WITHOUT LONG-TERM CURRENT USE OF INSULIN (HCC): Status: ACTIVE | Noted: 2021-07-27

## 2023-05-26 PROBLEM — R03.0 ELEVATED BLOOD PRESSURE READING WITHOUT DIAGNOSIS OF HYPERTENSION: Status: ACTIVE | Noted: 2019-05-06

## 2023-05-26 PROCEDURE — 99203 OFFICE O/P NEW LOW 30 MIN: CPT | Performed by: NURSE PRACTITIONER

## 2023-05-26 RX ORDER — FLUOXETINE 20 MG/1
TABLET, FILM COATED ORAL
COMMUNITY
Start: 2023-05-03 | End: 2023-05-26 | Stop reason: ALTCHOICE

## 2023-05-26 RX ORDER — PALIPERIDONE 3 MG/1
3 TABLET, EXTENDED RELEASE ORAL DAILY
COMMUNITY
Start: 2023-05-12 | End: 2023-05-26

## 2023-05-26 RX ORDER — PALIPERIDONE PALMITATE 156 MG/ML
INJECTION INTRAMUSCULAR
COMMUNITY
Start: 2023-05-20

## 2023-05-26 RX ORDER — PRAZOSIN HYDROCHLORIDE 2 MG/1
CAPSULE ORAL
COMMUNITY
Start: 2023-04-19 | End: 2023-05-26

## 2023-05-26 RX ORDER — RISPERIDONE 3 MG/1
3 TABLET ORAL DAILY
COMMUNITY
Start: 2023-05-10 | End: 2023-05-26

## 2023-05-26 RX ORDER — OMEPRAZOLE 20 MG/1
20 CAPSULE, DELAYED RELEASE ORAL DAILY
COMMUNITY
Start: 2023-04-18 | End: 2023-05-26

## 2023-05-26 ASSESSMENT — SLEEP AND FATIGUE QUESTIONNAIRES
ESS TOTAL SCORE: 4
HOW LIKELY ARE YOU TO NOD OFF OR FALL ASLEEP WHILE SITTING AND TALKING TO SOMEONE: 0
HOW LIKELY ARE YOU TO NOD OFF OR FALL ASLEEP WHILE SITTING AND READING: 1
NECK CIRCUMFERENCE (INCHES): 14
HOW LIKELY ARE YOU TO NOD OFF OR FALL ASLEEP WHILE SITTING QUIETLY AFTER LUNCH WITHOUT ALCOHOL: 0
HOW LIKELY ARE YOU TO NOD OFF OR FALL ASLEEP WHILE WATCHING TV: 1
HOW LIKELY ARE YOU TO NOD OFF OR FALL ASLEEP IN A CAR, WHILE STOPPED FOR A FEW MINUTES IN TRAFFIC: 0
HOW LIKELY ARE YOU TO NOD OFF OR FALL ASLEEP WHILE SITTING INACTIVE IN A PUBLIC PLACE: 0
HOW LIKELY ARE YOU TO NOD OFF OR FALL ASLEEP WHILE LYING DOWN TO REST IN THE AFTERNOON WHEN CIRCUMSTANCES PERMIT: 0
HOW LIKELY ARE YOU TO NOD OFF OR FALL ASLEEP WHEN YOU ARE A PASSENGER IN A CAR FOR AN HOUR WITHOUT A BREAK: 2

## 2023-05-26 ASSESSMENT — ENCOUNTER SYMPTOMS
WHEEZING: 0
SORE THROAT: 0
EYE PAIN: 0
CHEST TIGHTNESS: 0
RHINORRHEA: 0
SHORTNESS OF BREATH: 0
ABDOMINAL PAIN: 0
COUGH: 0

## 2023-05-26 NOTE — PROGRESS NOTES
MA Communication: The following orders are received by verbal communication from Presley Marx, [address]     Orders include:  [id number] [facility] will call to schedule. Dr. Raji Harrell will call with results.
Chest wall: No tenderness. Abdominal:      Palpations: Abdomen is soft. Tenderness: There is no abdominal tenderness. There is no guarding or rebound. Musculoskeletal:         General: Normal range of motion. Cervical back: Neck supple. Right lower leg: No edema. Left lower leg: No edema. Lymphadenopathy:      Cervical: No cervical adenopathy. Skin:     General: Skin is warm and dry. Capillary Refill: Capillary refill takes less than 2 seconds. Neurological:      Mental Status: He is alert and oriented to person, place, and time. Psychiatric:         Mood and Affect: Mood normal.         Behavior: Behavior normal.         Thought Content: Thought content normal.         Judgment: Judgment normal.        Assessment/Plan:     1. ELIZA (obstructive sleep apnea)  2. Female-to-male transgender person  3. Palpitations  4. Class 2 severe obesity due to excess calories with serious comorbidity and body mass index (BMI) of 37.0 to 37.9 in adult [facility])            RECOMMENDATIONS:     Nirmala Piedra will be scheduled for polysomnography in order to evaluate for the presence and severity of obstructive sleep apnea. He was given a discussion of the pathophysiology, evaluation and treatment of apnea. Thyroid function tests are recommended if not done recently. Advised to avoid driving when too sleepy to function safely. Discussed the risks of untreated apnea such as accidents, cognitive impairment, mood impairment, high blood pressure, various cardiac diseases and stroke. Regarding obesity, recommend to try a formal program and increase physical activity by adding a 30 minute walk to daily routine. Weight loss was encouraged as a long term approach to treatment of ELIZA. Explained the correlation between obesity and apnea and the causative role it can play. Heart rate regular and controlled today.      Dr. María Garcia will call you with the results     Jones Flaherty, SONIA - CNP

## 2023-05-26 NOTE — PATIENT INSTRUCTIONS
instructed of all future appointment dates & times, including radiology, labs, procedures & referrals. If procedures were scheduled preparation instructions provided. Instructions on future appointments with [facility] were given. MA Communication: The following orders are received by verbal communication from Chiquita Oro, [facility]    Orders include:  [address] will call to schedule. Dr. Theresa Guthrie will call with results. Your home sleep test is scheduled to be picked up at the Sleep center located at [facility]. Address to Sleep Center: The Sleep Center at [address] [place] [address], [address] [place] [address]            Phone: [phone] Fax: [phone]    If you should need to cancel or reschedule your appointment, please call the Sleep Center at [phone] as soon as possible. We ask that you please phone the [facility] Patient Pre-Services ([phone]) at least 3-5 days prior to your sleep study to pre-register. Failing to pre-register may ultimately cause your insurance to not pay for this procedure. Please refrain from or reduce the use of caffeine and/or alcohol prior to your sleep study and avoid napping the day of your study as these will affect the accuracy of your test results.

## 2023-06-09 ENCOUNTER — OFFICE VISIT (OUTPATIENT)
Dept: FAMILY MEDICINE CLINIC | Age: 23
End: 2023-06-09
Payer: COMMERCIAL

## 2023-06-09 VITALS
TEMPERATURE: 97.1 F | BODY MASS INDEX: 37.9 KG/M2 | SYSTOLIC BLOOD PRESSURE: 107 MMHG | OXYGEN SATURATION: 99 % | WEIGHT: 242 LBS | RESPIRATION RATE: 16 BRPM | DIASTOLIC BLOOD PRESSURE: 76 MMHG | HEART RATE: 101 BPM

## 2023-06-09 DIAGNOSIS — N64.3 GALACTORRHEA: Primary | ICD-10-CM

## 2023-06-09 DIAGNOSIS — N64.3 GALACTORRHEA: ICD-10-CM

## 2023-06-09 LAB
ALBUMIN SERPL-MCNC: 5 G/DL (ref 3.4–5)
ANION GAP SERPL CALCULATED.3IONS-SCNC: 13 MMOL/L (ref 3–16)
BUN SERPL-MCNC: 7 MG/DL (ref 7–20)
CALCIUM SERPL-MCNC: 9.5 MG/DL (ref 8.3–10.6)
CHLORIDE SERPL-SCNC: 101 MMOL/L (ref 99–110)
CO2 SERPL-SCNC: 24 MMOL/L (ref 21–32)
CREAT SERPL-MCNC: 0.8 MG/DL (ref 0.6–1.1)
GFR SERPLBLD CREATININE-BSD FMLA CKD-EPI: >60 ML/MIN/{1.73_M2}
GLUCOSE SERPL-MCNC: 87 MG/DL (ref 70–99)
HCG SERPL QL: NEGATIVE
PHOSPHATE SERPL-MCNC: 3.7 MG/DL (ref 2.5–4.9)
POTASSIUM SERPL-SCNC: 4.2 MMOL/L (ref 3.5–5.1)
PROLACTIN SERPL IA-MCNC: 52.4 NG/ML
SODIUM SERPL-SCNC: 138 MMOL/L (ref 136–145)
TSH SERPL DL<=0.005 MIU/L-ACNC: 1.05 UIU/ML (ref 0.27–4.2)

## 2023-06-09 PROCEDURE — 99213 OFFICE O/P EST LOW 20 MIN: CPT | Performed by: NURSE PRACTITIONER

## 2023-06-09 RX ORDER — PALIPERIDONE 3 MG/1
3 TABLET, EXTENDED RELEASE ORAL DAILY
COMMUNITY
Start: 2023-06-05

## 2023-06-09 SDOH — ECONOMIC STABILITY: FOOD INSECURITY: WITHIN THE PAST 12 MONTHS, THE FOOD YOU BOUGHT JUST DIDN'T LAST AND YOU DIDN'T HAVE MONEY TO GET MORE.: NEVER TRUE

## 2023-06-09 SDOH — ECONOMIC STABILITY: FOOD INSECURITY: WITHIN THE PAST 12 MONTHS, YOU WORRIED THAT YOUR FOOD WOULD RUN OUT BEFORE YOU GOT MONEY TO BUY MORE.: NEVER TRUE

## 2023-06-09 SDOH — ECONOMIC STABILITY: INCOME INSECURITY: HOW HARD IS IT FOR YOU TO PAY FOR THE VERY BASICS LIKE FOOD, HOUSING, MEDICAL CARE, AND HEATING?: NOT HARD AT ALL

## 2023-06-09 ASSESSMENT — ANXIETY QUESTIONNAIRES
2. NOT BEING ABLE TO STOP OR CONTROL WORRYING: 0
GAD7 TOTAL SCORE: 0
4. TROUBLE RELAXING: 0
5. BEING SO RESTLESS THAT IT IS HARD TO SIT STILL: 0
1. FEELING NERVOUS, ANXIOUS, OR ON EDGE: 0
6. BECOMING EASILY ANNOYED OR IRRITABLE: 0
IF YOU CHECKED OFF ANY PROBLEMS ON THIS QUESTIONNAIRE, HOW DIFFICULT HAVE THESE PROBLEMS MADE IT FOR YOU TO DO YOUR WORK, TAKE CARE OF THINGS AT HOME, OR GET ALONG WITH OTHER PEOPLE: NOT DIFFICULT AT ALL
3. WORRYING TOO MUCH ABOUT DIFFERENT THINGS: 0
7. FEELING AFRAID AS IF SOMETHING AWFUL MIGHT HAPPEN: 0

## 2023-06-09 ASSESSMENT — ENCOUNTER SYMPTOMS
NAUSEA: 0
COUGH: 0
SHORTNESS OF BREATH: 0
DIARRHEA: 0
VOMITING: 0

## 2023-06-09 ASSESSMENT — PATIENT HEALTH QUESTIONNAIRE - PHQ9
1. LITTLE INTEREST OR PLEASURE IN DOING THINGS: 0
DEPRESSION UNABLE TO ASSESS: FUNCTIONAL CAPACITY MOTIVATION LIMITS ACCURACY
SUM OF ALL RESPONSES TO PHQ QUESTIONS 1-9: 0
SUM OF ALL RESPONSES TO PHQ QUESTIONS 1-9: 0
SUM OF ALL RESPONSES TO PHQ9 QUESTIONS 1 & 2: 0
SUM OF ALL RESPONSES TO PHQ QUESTIONS 1-9: 0
2. FEELING DOWN, DEPRESSED OR HOPELESS: 0
SUM OF ALL RESPONSES TO PHQ QUESTIONS 1-9: 0

## 2023-06-09 NOTE — PATIENT INSTRUCTIONS
Get the blood work done today  Monitor symptoms  I think its likely related for the recent change in the paliperidone  If there is any blood discharge, breast mass, skin changes on breast please call.    Will get imaging if the discharge persists/returns

## 2023-07-07 ENCOUNTER — OFFICE VISIT (OUTPATIENT)
Dept: FAMILY MEDICINE CLINIC | Age: 23
End: 2023-07-07
Payer: COMMERCIAL

## 2023-07-07 VITALS
TEMPERATURE: 97.1 F | HEART RATE: 85 BPM | RESPIRATION RATE: 16 BRPM | SYSTOLIC BLOOD PRESSURE: 119 MMHG | DIASTOLIC BLOOD PRESSURE: 80 MMHG | OXYGEN SATURATION: 98 % | WEIGHT: 238 LBS | HEIGHT: 67 IN | BODY MASS INDEX: 37.35 KG/M2

## 2023-07-07 DIAGNOSIS — F33.2 SEVERE EPISODE OF RECURRENT MAJOR DEPRESSIVE DISORDER, WITHOUT PSYCHOTIC FEATURES (HCC): ICD-10-CM

## 2023-07-07 DIAGNOSIS — N64.52 DISCHARGE FROM BREAST: Primary | ICD-10-CM

## 2023-07-07 DIAGNOSIS — R20.0 NUMBNESS: ICD-10-CM

## 2023-07-07 PROCEDURE — 99213 OFFICE O/P EST LOW 20 MIN: CPT | Performed by: STUDENT IN AN ORGANIZED HEALTH CARE EDUCATION/TRAINING PROGRAM

## 2023-07-07 SDOH — ECONOMIC STABILITY: INCOME INSECURITY: HOW HARD IS IT FOR YOU TO PAY FOR THE VERY BASICS LIKE FOOD, HOUSING, MEDICAL CARE, AND HEATING?: NOT HARD AT ALL

## 2023-07-07 SDOH — ECONOMIC STABILITY: FOOD INSECURITY: WITHIN THE PAST 12 MONTHS, YOU WORRIED THAT YOUR FOOD WOULD RUN OUT BEFORE YOU GOT MONEY TO BUY MORE.: NEVER TRUE

## 2023-07-07 SDOH — ECONOMIC STABILITY: FOOD INSECURITY: WITHIN THE PAST 12 MONTHS, THE FOOD YOU BOUGHT JUST DIDN'T LAST AND YOU DIDN'T HAVE MONEY TO GET MORE.: NEVER TRUE

## 2023-07-07 ASSESSMENT — COLUMBIA-SUICIDE SEVERITY RATING SCALE - C-SSRS
1. WITHIN THE PAST MONTH, HAVE YOU WISHED YOU WERE DEAD OR WISHED YOU COULD GO TO SLEEP AND NOT WAKE UP?: YES
6. HAVE YOU EVER DONE ANYTHING, STARTED TO DO ANYTHING, OR PREPARED TO DO ANYTHING TO END YOUR LIFE?: YES
2. HAVE YOU ACTUALLY HAD ANY THOUGHTS OF KILLING YOURSELF?: NO
7. DID THIS OCCUR IN THE LAST THREE MONTHS: NO

## 2023-07-07 ASSESSMENT — PATIENT HEALTH QUESTIONNAIRE - PHQ9
10. IF YOU CHECKED OFF ANY PROBLEMS, HOW DIFFICULT HAVE THESE PROBLEMS MADE IT FOR YOU TO DO YOUR WORK, TAKE CARE OF THINGS AT HOME, OR GET ALONG WITH OTHER PEOPLE: 2
8. MOVING OR SPEAKING SO SLOWLY THAT OTHER PEOPLE COULD HAVE NOTICED. OR THE OPPOSITE, BEING SO FIGETY OR RESTLESS THAT YOU HAVE BEEN MOVING AROUND A LOT MORE THAN USUAL: 0
SUM OF ALL RESPONSES TO PHQ QUESTIONS 1-9: 13
SUM OF ALL RESPONSES TO PHQ QUESTIONS 1-9: 13
1. LITTLE INTEREST OR PLEASURE IN DOING THINGS: 0
SUM OF ALL RESPONSES TO PHQ QUESTIONS 1-9: 0
7. TROUBLE CONCENTRATING ON THINGS, SUCH AS READING THE NEWSPAPER OR WATCHING TELEVISION: 3
9. THOUGHTS THAT YOU WOULD BE BETTER OFF DEAD, OR OF HURTING YOURSELF: 1
1. LITTLE INTEREST OR PLEASURE IN DOING THINGS: 0
2. FEELING DOWN, DEPRESSED OR HOPELESS: 0
SUM OF ALL RESPONSES TO PHQ QUESTIONS 1-9: 12
SUM OF ALL RESPONSES TO PHQ QUESTIONS 1-9: 0
SUM OF ALL RESPONSES TO PHQ QUESTIONS 1-9: 13
SUM OF ALL RESPONSES TO PHQ QUESTIONS 1-9: 0
2. FEELING DOWN, DEPRESSED OR HOPELESS: 0
SUM OF ALL RESPONSES TO PHQ QUESTIONS 1-9: 0
SUM OF ALL RESPONSES TO PHQ9 QUESTIONS 1 & 2: 0
5. POOR APPETITE OR OVEREATING: 3
SUM OF ALL RESPONSES TO PHQ9 QUESTIONS 1 & 2: 0
3. TROUBLE FALLING OR STAYING ASLEEP: 3
4. FEELING TIRED OR HAVING LITTLE ENERGY: 3
6. FEELING BAD ABOUT YOURSELF - OR THAT YOU ARE A FAILURE OR HAVE LET YOURSELF OR YOUR FAMILY DOWN: 0

## 2023-07-08 LAB — VIT B12 SERPL-MCNC: 348 PG/ML (ref 211–911)

## 2023-07-12 ENCOUNTER — OFFICE VISIT (OUTPATIENT)
Dept: URGENT CARE | Age: 23
End: 2023-07-12

## 2023-07-12 VITALS
SYSTOLIC BLOOD PRESSURE: 124 MMHG | BODY MASS INDEX: 37.51 KG/M2 | TEMPERATURE: 98.4 F | HEIGHT: 67 IN | WEIGHT: 239 LBS | DIASTOLIC BLOOD PRESSURE: 82 MMHG | OXYGEN SATURATION: 98 % | HEART RATE: 89 BPM | RESPIRATION RATE: 12 BRPM
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
## 2023-07-12 DIAGNOSIS — T14.8XXA HEMATOMA: ICD-10-CM

## 2023-07-12 ASSESSMENT — ENCOUNTER SYMPTOMS
RESPIRATORY NEGATIVE: 1
GASTROINTESTINAL NEGATIVE: 1
EYES NEGATIVE: 1
ALLERGIC/IMMUNOLOGIC NEGATIVE: 1

## 2023-08-23 ENCOUNTER — OFFICE VISIT (OUTPATIENT)
Dept: FAMILY MEDICINE CLINIC | Age: 23
End: 2023-08-23
Payer: COMMERCIAL

## 2023-08-23 VITALS
WEIGHT: 239 LBS | RESPIRATION RATE: 16 BRPM | SYSTOLIC BLOOD PRESSURE: 125 MMHG | TEMPERATURE: 97.1 F | DIASTOLIC BLOOD PRESSURE: 83 MMHG | OXYGEN SATURATION: 98 % | BODY MASS INDEX: 37.43 KG/M2 | HEART RATE: 98 BPM

## 2023-08-23 DIAGNOSIS — Z09 HOSPITAL DISCHARGE FOLLOW-UP: ICD-10-CM

## 2023-08-23 DIAGNOSIS — S51.811D: ICD-10-CM

## 2023-08-23 DIAGNOSIS — F25.0 SCHIZOAFFECTIVE DISORDER, BIPOLAR TYPE (HCC): ICD-10-CM

## 2023-08-23 DIAGNOSIS — F50.9 EATING DISORDER, UNSPECIFIED TYPE: ICD-10-CM

## 2023-08-23 DIAGNOSIS — T14.91XA SUICIDAL BEHAVIOR WITH ATTEMPTED SELF-INJURY (HCC): Primary | ICD-10-CM

## 2023-08-23 DIAGNOSIS — Z72.89 DELIBERATE SELF-CUTTING: ICD-10-CM

## 2023-08-23 PROCEDURE — 1111F DSCHRG MED/CURRENT MED MERGE: CPT | Performed by: NURSE PRACTITIONER

## 2023-08-23 PROCEDURE — 99214 OFFICE O/P EST MOD 30 MIN: CPT | Performed by: NURSE PRACTITIONER

## 2023-08-23 SDOH — ECONOMIC STABILITY: FOOD INSECURITY: WITHIN THE PAST 12 MONTHS, THE FOOD YOU BOUGHT JUST DIDN'T LAST AND YOU DIDN'T HAVE MONEY TO GET MORE.: NEVER TRUE

## 2023-08-23 SDOH — ECONOMIC STABILITY: INCOME INSECURITY: HOW HARD IS IT FOR YOU TO PAY FOR THE VERY BASICS LIKE FOOD, HOUSING, MEDICAL CARE, AND HEATING?: NOT HARD AT ALL

## 2023-08-23 SDOH — ECONOMIC STABILITY: FOOD INSECURITY: WITHIN THE PAST 12 MONTHS, YOU WORRIED THAT YOUR FOOD WOULD RUN OUT BEFORE YOU GOT MONEY TO BUY MORE.: NEVER TRUE

## 2023-08-23 ASSESSMENT — ANXIETY QUESTIONNAIRES
GAD7 TOTAL SCORE: 0
1. FEELING NERVOUS, ANXIOUS, OR ON EDGE: 0
2. NOT BEING ABLE TO STOP OR CONTROL WORRYING: 0
IF YOU CHECKED OFF ANY PROBLEMS ON THIS QUESTIONNAIRE, HOW DIFFICULT HAVE THESE PROBLEMS MADE IT FOR YOU TO DO YOUR WORK, TAKE CARE OF THINGS AT HOME, OR GET ALONG WITH OTHER PEOPLE: NOT DIFFICULT AT ALL
4. TROUBLE RELAXING: 0
3. WORRYING TOO MUCH ABOUT DIFFERENT THINGS: 0
6. BECOMING EASILY ANNOYED OR IRRITABLE: 0
5. BEING SO RESTLESS THAT IT IS HARD TO SIT STILL: 0
7. FEELING AFRAID AS IF SOMETHING AWFUL MIGHT HAPPEN: 0

## 2023-08-23 ASSESSMENT — PATIENT HEALTH QUESTIONNAIRE - PHQ9
8. MOVING OR SPEAKING SO SLOWLY THAT OTHER PEOPLE COULD HAVE NOTICED. OR THE OPPOSITE, BEING SO FIGETY OR RESTLESS THAT YOU HAVE BEEN MOVING AROUND A LOT MORE THAN USUAL: 0
3. TROUBLE FALLING OR STAYING ASLEEP: 0
4. FEELING TIRED OR HAVING LITTLE ENERGY: 0
2. FEELING DOWN, DEPRESSED OR HOPELESS: 0
SUM OF ALL RESPONSES TO PHQ9 QUESTIONS 1 & 2: 0
10. IF YOU CHECKED OFF ANY PROBLEMS, HOW DIFFICULT HAVE THESE PROBLEMS MADE IT FOR YOU TO DO YOUR WORK, TAKE CARE OF THINGS AT HOME, OR GET ALONG WITH OTHER PEOPLE: 0
SUM OF ALL RESPONSES TO PHQ QUESTIONS 1-9: 0
SUM OF ALL RESPONSES TO PHQ QUESTIONS 1-9: 0
6. FEELING BAD ABOUT YOURSELF - OR THAT YOU ARE A FAILURE OR HAVE LET YOURSELF OR YOUR FAMILY DOWN: 0
1. LITTLE INTEREST OR PLEASURE IN DOING THINGS: 0
7. TROUBLE CONCENTRATING ON THINGS, SUCH AS READING THE NEWSPAPER OR WATCHING TELEVISION: 0
9. THOUGHTS THAT YOU WOULD BE BETTER OFF DEAD, OR OF HURTING YOURSELF: 0
SUM OF ALL RESPONSES TO PHQ QUESTIONS 1-9: 0
DEPRESSION UNABLE TO ASSESS: FUNCTIONAL CAPACITY MOTIVATION LIMITS ACCURACY
SUM OF ALL RESPONSES TO PHQ QUESTIONS 1-9: 0
5. POOR APPETITE OR OVEREATING: 0

## 2023-08-23 ASSESSMENT — ENCOUNTER SYMPTOMS
SHORTNESS OF BREATH: 0
VOMITING: 0
NAUSEA: 0
DIARRHEA: 0
COUGH: 0

## 2023-08-23 NOTE — PROGRESS NOTES
2023     Chief Complaint   Patient presents with    Follow-Up from Hospital    Laceration     Suture removal         Nirmala Piedra (:  2000) is a 26 y.o. adult with past medical history of  schizoaffective disorder, bipolar depression, eating disorder, obesity, suicidal ideation, PTSD, GERD, trans female to male who is here for evaluation of the following medical concerns:    Hospital follow up; admitted to [address] [place] [address] 23- 23 for suicidal behavior with attempted injury. Was brought in by police. Was cutting arm with x-acto knife. Has sutures placed in lacerations on right forearm on 2023., those sutures are to be removed today. Pt was started on Abilify that week by psychiatry (Dr. Justus Neumann) also on [facility]. Persistent depression symptoms, more down that up days. Report suicidal thoughts persist without plan or intention. Pt waved down  for help prior to being admitted to hospital. Next psych appointment is scheduled for 23. Has several more lacerations of the right forearm which were sutured on 23 and will need to be removed next week. Pt denies any pain of the arm near any of the lacerations, swelling, redness, or drainage. States galactorrhea stopped after being started on Abilify   Review of Systems   Constitutional:  Negative for chills, fatigue and fever. Respiratory:  Negative for cough and shortness of breath. Cardiovascular:  Negative for chest pain and leg swelling. Gastrointestinal:  Negative for diarrhea, nausea and vomiting. Skin:  Positive for wound. Neurological:  Negative for dizziness and headaches. All other systems reviewed and are negative. Prior to Visit Medications    Medication Sig Taking?  Authorizing Provider   INVEGA SUSTENNA 156 MG/ML RALF IM injection 234 mg injection once monthly Yes Historical Provider, MD   omeprazole (PRILOSEC) 20 MG delayed release capsule Take 1 capsule by mouth daily Yes Historical

## 2023-08-23 NOTE — PATIENT INSTRUCTIONS
Keep the lacerations clean and dry  Follow up 8-30 for suture removal to the other lacerations  Continue medications  Keep appointment with psychiatrist

## 2023-08-30 ENCOUNTER — OFFICE VISIT (OUTPATIENT)
Dept: FAMILY MEDICINE CLINIC | Age: 23
End: 2023-08-30
Payer: COMMERCIAL

## 2023-08-30 VITALS
HEART RATE: 99 BPM | OXYGEN SATURATION: 98 % | SYSTOLIC BLOOD PRESSURE: 114 MMHG | RESPIRATION RATE: 16 BRPM | TEMPERATURE: 97.1 F | BODY MASS INDEX: 37.43 KG/M2 | WEIGHT: 239 LBS | DIASTOLIC BLOOD PRESSURE: 66 MMHG

## 2023-08-30 DIAGNOSIS — S51.811D LACERATION OF RIGHT FOREARM, SUBSEQUENT ENCOUNTER: Primary | ICD-10-CM

## 2023-08-30 DIAGNOSIS — F50.9 EATING DISORDER, UNSPECIFIED TYPE: ICD-10-CM

## 2023-08-30 DIAGNOSIS — R25.1 TREMOR: ICD-10-CM

## 2023-08-30 DIAGNOSIS — Z48.02 VISIT FOR SUTURE REMOVAL: ICD-10-CM

## 2023-08-30 DIAGNOSIS — R41.3 MEMORY LOSS: ICD-10-CM

## 2023-08-30 DIAGNOSIS — F25.0 SCHIZOAFFECTIVE DISORDER, BIPOLAR TYPE (HCC): ICD-10-CM

## 2023-08-30 PROCEDURE — 99214 OFFICE O/P EST MOD 30 MIN: CPT | Performed by: NURSE PRACTITIONER

## 2023-08-30 RX ORDER — ARIPIPRAZOLE 5 MG/1
5 TABLET ORAL DAILY
COMMUNITY

## 2023-08-30 ASSESSMENT — ENCOUNTER SYMPTOMS
COUGH: 0
DIARRHEA: 0
SHORTNESS OF BREATH: 0
VOMITING: 0
NAUSEA: 0

## 2023-08-30 NOTE — PATIENT INSTRUCTIONS
- keep laceration site on the arm clean and dry  - Make appointment with neurologist fir memory issues  - Discuss these symptoms with your psychiatrist as well

## 2023-08-31 ENCOUNTER — OFFICE VISIT (OUTPATIENT)
Dept: URGENT CARE | Age: 23
End: 2023-08-31

## 2023-08-31 VITALS
OXYGEN SATURATION: 98 % | DIASTOLIC BLOOD PRESSURE: 87 MMHG | HEART RATE: 97 BPM | SYSTOLIC BLOOD PRESSURE: 138 MMHG | WEIGHT: 249 LBS | TEMPERATURE: 99.2 F | RESPIRATION RATE: 18 BRPM | HEIGHT: 66 IN | BODY MASS INDEX: 40.02 KG/M2

## 2023-08-31 DIAGNOSIS — R51.9 ACUTE INTRACTABLE HEADACHE, UNSPECIFIED HEADACHE TYPE: Primary | ICD-10-CM

## 2023-08-31 DIAGNOSIS — R41.3 MEMORY LOSS: ICD-10-CM

## 2023-08-31 ASSESSMENT — ENCOUNTER SYMPTOMS
RESPIRATORY NEGATIVE: 1
PHOTOPHOBIA: 1
GASTROINTESTINAL NEGATIVE: 1

## 2023-08-31 NOTE — PROGRESS NOTES
is not ill-appearing. HENT:      Head: Normocephalic. Right Ear: Tympanic membrane, ear canal and external ear normal. There is no impacted cerumen. Left Ear: Tympanic membrane, ear canal and external ear normal. There is no impacted cerumen. Nose: Nose normal. No congestion or rhinorrhea. Mouth/Throat:      Mouth: Mucous membranes are moist.      Pharynx: No oropharyngeal exudate or posterior oropharyngeal erythema. Eyes:      General: No scleral icterus. Right eye: No discharge. Left eye: No discharge. Extraocular Movements: Extraocular movements intact. Conjunctiva/sclera: Conjunctivae normal.      Pupils: Pupils are equal, round, and reactive to light. Cardiovascular:      Rate and Rhythm: Normal rate and regular rhythm. Pulses: Normal pulses. Heart sounds: Normal heart sounds. No murmur heard. Pulmonary:      Effort: Pulmonary effort is normal. No respiratory distress. Breath sounds: Normal breath sounds. Musculoskeletal:      Cervical back: Normal range of motion and neck supple. No rigidity. Skin:     General: Skin is warm. Neurological:      Mental Status: He is alert and oriented to person, place, and time. Psychiatric:         Attention and Perception: Attention normal.         Mood and Affect: Affect is flat. Speech: Speech normal.         Behavior: Behavior is slowed. Cognition and Memory: Abnormal recent memory: patient oriented to self, year and place, can recite phone number and address, confused to day and month. An electronic signature was used to authenticate this note.     --SONIA Goldberg - CNP appears normal and intact appears normal and intact

## 2023-10-10 ENCOUNTER — OFFICE VISIT (OUTPATIENT)
Dept: FAMILY MEDICINE CLINIC | Age: 23
End: 2023-10-10
Payer: COMMERCIAL

## 2023-10-10 VITALS
TEMPERATURE: 97.1 F | OXYGEN SATURATION: 99 % | RESPIRATION RATE: 16 BRPM | HEART RATE: 86 BPM | BODY MASS INDEX: 41.32 KG/M2 | WEIGHT: 256 LBS | SYSTOLIC BLOOD PRESSURE: 125 MMHG | DIASTOLIC BLOOD PRESSURE: 83 MMHG

## 2023-10-10 DIAGNOSIS — R41.3 MEMORY LOSS: ICD-10-CM

## 2023-10-10 DIAGNOSIS — N64.3 GALACTORRHEA: ICD-10-CM

## 2023-10-10 DIAGNOSIS — R55 SYNCOPE AND COLLAPSE: ICD-10-CM

## 2023-10-10 DIAGNOSIS — R31.0 GROSS HEMATURIA: Primary | ICD-10-CM

## 2023-10-10 LAB
BILIRUBIN, POC: NEGATIVE
BLOOD URINE, POC: NEGATIVE
CLARITY, POC: NORMAL
COLOR, POC: NORMAL
GLUCOSE URINE, POC: NEGATIVE
KETONES, POC: NEGATIVE
LEUKOCYTE EST, POC: NEGATIVE
NITRITE, POC: NEGATIVE
PH, POC: 6.5
PROTEIN, POC: NEGATIVE
SPECIFIC GRAVITY, POC: 1.02
UROBILINOGEN, POC: NORMAL

## 2023-10-10 PROCEDURE — 81003 URINALYSIS AUTO W/O SCOPE: CPT | Performed by: NURSE PRACTITIONER

## 2023-10-10 PROCEDURE — 99213 OFFICE O/P EST LOW 20 MIN: CPT | Performed by: NURSE PRACTITIONER

## 2023-10-10 SDOH — ECONOMIC STABILITY: FOOD INSECURITY: WITHIN THE PAST 12 MONTHS, THE FOOD YOU BOUGHT JUST DIDN'T LAST AND YOU DIDN'T HAVE MONEY TO GET MORE.: NEVER TRUE

## 2023-10-10 SDOH — ECONOMIC STABILITY: INCOME INSECURITY: HOW HARD IS IT FOR YOU TO PAY FOR THE VERY BASICS LIKE FOOD, HOUSING, MEDICAL CARE, AND HEATING?: NOT HARD AT ALL

## 2023-10-10 SDOH — ECONOMIC STABILITY: FOOD INSECURITY: WITHIN THE PAST 12 MONTHS, YOU WORRIED THAT YOUR FOOD WOULD RUN OUT BEFORE YOU GOT MONEY TO BUY MORE.: NEVER TRUE

## 2023-10-10 ASSESSMENT — ANXIETY QUESTIONNAIRES
6. BECOMING EASILY ANNOYED OR IRRITABLE: 0
2. NOT BEING ABLE TO STOP OR CONTROL WORRYING: 0
5. BEING SO RESTLESS THAT IT IS HARD TO SIT STILL: 0
1. FEELING NERVOUS, ANXIOUS, OR ON EDGE: 0
7. FEELING AFRAID AS IF SOMETHING AWFUL MIGHT HAPPEN: 0
IF YOU CHECKED OFF ANY PROBLEMS ON THIS QUESTIONNAIRE, HOW DIFFICULT HAVE THESE PROBLEMS MADE IT FOR YOU TO DO YOUR WORK, TAKE CARE OF THINGS AT HOME, OR GET ALONG WITH OTHER PEOPLE: NOT DIFFICULT AT ALL
3. WORRYING TOO MUCH ABOUT DIFFERENT THINGS: 0
GAD7 TOTAL SCORE: 0
4. TROUBLE RELAXING: 0

## 2023-10-10 ASSESSMENT — PATIENT HEALTH QUESTIONNAIRE - PHQ9
7. TROUBLE CONCENTRATING ON THINGS, SUCH AS READING THE NEWSPAPER OR WATCHING TELEVISION: 0
10. IF YOU CHECKED OFF ANY PROBLEMS, HOW DIFFICULT HAVE THESE PROBLEMS MADE IT FOR YOU TO DO YOUR WORK, TAKE CARE OF THINGS AT HOME, OR GET ALONG WITH OTHER PEOPLE: 0
5. POOR APPETITE OR OVEREATING: 0
4. FEELING TIRED OR HAVING LITTLE ENERGY: 0
SUM OF ALL RESPONSES TO PHQ9 QUESTIONS 1 & 2: 0
DEPRESSION UNABLE TO ASSESS: FUNCTIONAL CAPACITY MOTIVATION LIMITS ACCURACY
9. THOUGHTS THAT YOU WOULD BE BETTER OFF DEAD, OR OF HURTING YOURSELF: 0
SUM OF ALL RESPONSES TO PHQ QUESTIONS 1-9: 0
SUM OF ALL RESPONSES TO PHQ QUESTIONS 1-9: 0
2. FEELING DOWN, DEPRESSED OR HOPELESS: 0
SUM OF ALL RESPONSES TO PHQ QUESTIONS 1-9: 0
6. FEELING BAD ABOUT YOURSELF - OR THAT YOU ARE A FAILURE OR HAVE LET YOURSELF OR YOUR FAMILY DOWN: 0
SUM OF ALL RESPONSES TO PHQ QUESTIONS 1-9: 0
3. TROUBLE FALLING OR STAYING ASLEEP: 0
8. MOVING OR SPEAKING SO SLOWLY THAT OTHER PEOPLE COULD HAVE NOTICED. OR THE OPPOSITE, BEING SO FIGETY OR RESTLESS THAT YOU HAVE BEEN MOVING AROUND A LOT MORE THAN USUAL: 0
1. LITTLE INTEREST OR PLEASURE IN DOING THINGS: 0

## 2023-10-11 DIAGNOSIS — N39.0 URINARY TRACT INFECTION WITHOUT HEMATURIA, SITE UNSPECIFIED: ICD-10-CM

## 2023-10-11 DIAGNOSIS — N64.3 GALACTORRHEA: ICD-10-CM

## 2023-10-11 ASSESSMENT — ENCOUNTER SYMPTOMS
SHORTNESS OF BREATH: 0
VOMITING: 0
NAUSEA: 0
COUGH: 0
DIARRHEA: 0

## 2023-10-12 LAB
BASOPHILS # BLD: 0.1 K/UL (ref 0–0.2)
BASOPHILS NFR BLD: 0.8 %
DEPRECATED RDW RBC AUTO: 13.4 % (ref 12.4–15.4)
EOSINOPHIL # BLD: 0.1 K/UL (ref 0–0.6)
EOSINOPHIL NFR BLD: 2.3 %
HCT VFR BLD AUTO: 35.5 % (ref 36–48)
HGB BLD-MCNC: 11.8 G/DL (ref 12–16)
LYMPHOCYTES # BLD: 1.3 K/UL (ref 1–5.1)
LYMPHOCYTES NFR BLD: 20.6 %
MCH RBC QN AUTO: 28.1 PG (ref 26–34)
MCHC RBC AUTO-ENTMCNC: 33.1 G/DL (ref 31–36)
MCV RBC AUTO: 84.9 FL (ref 80–100)
MONOCYTES # BLD: 0.5 K/UL (ref 0–1.3)
MONOCYTES NFR BLD: 8 %
NEUTROPHILS # BLD: 4.4 K/UL (ref 1.7–7.7)
NEUTROPHILS NFR BLD: 68.3 %
PLATELET # BLD AUTO: 317 K/UL (ref 135–450)
PMV BLD AUTO: 7.5 FL (ref 5–10.5)
PROLACTIN SERPL IA-MCNC: 43.9 NG/ML
RBC # BLD AUTO: 4.19 M/UL (ref 4–5.2)
WBC # BLD AUTO: 6.4 K/UL (ref 4–11)

## 2023-10-19 ENCOUNTER — OFFICE VISIT (OUTPATIENT)
Age: 23
End: 2023-10-19

## 2023-10-19 VITALS
TEMPERATURE: 98.1 F | DIASTOLIC BLOOD PRESSURE: 85 MMHG | HEIGHT: 66 IN | RESPIRATION RATE: 18 BRPM | HEART RATE: 85 BPM | BODY MASS INDEX: 41.78 KG/M2 | OXYGEN SATURATION: 97 % | SYSTOLIC BLOOD PRESSURE: 125 MMHG | WEIGHT: 260 LBS

## 2023-10-19 VITALS — TEMPERATURE: 97.9 F | HEART RATE: 88 BPM | OXYGEN SATURATION: 98 % | RESPIRATION RATE: 18 BRPM

## 2023-10-19 DIAGNOSIS — S91.332A PUNCTURE WOUND OF LEFT FOOT, INITIAL ENCOUNTER: ICD-10-CM

## 2023-10-19 DIAGNOSIS — S91.332D PUNCTURE WOUND OF LEFT FOOT, SUBSEQUENT ENCOUNTER: Primary | ICD-10-CM

## 2023-10-19 DIAGNOSIS — T14.8XXA PUNCTURE WOUND: Primary | ICD-10-CM

## 2023-10-19 RX ORDER — PALIPERIDONE PALMITATE 234 MG/1.5ML
INJECTION INTRAMUSCULAR
COMMUNITY
Start: 2023-10-09

## 2023-10-19 RX ORDER — SULFAMETHOXAZOLE AND TRIMETHOPRIM 800; 160 MG/1; MG/1
1 TABLET ORAL 2 TIMES DAILY
Qty: 14 TABLET | Refills: 0 | Status: SHIPPED | OUTPATIENT
Start: 2023-10-19 | End: 2023-10-19 | Stop reason: ALTCHOICE

## 2023-10-19 RX ORDER — DOXYCYCLINE HYCLATE 100 MG
100 TABLET ORAL 2 TIMES DAILY
Qty: 14 TABLET | Refills: 0 | Status: SHIPPED | OUTPATIENT
Start: 2023-10-19 | End: 2023-10-26

## 2023-10-25 ENCOUNTER — OFFICE VISIT (OUTPATIENT)
Dept: FAMILY MEDICINE CLINIC | Age: 23
End: 2023-10-25
Payer: COMMERCIAL

## 2023-10-25 ENCOUNTER — HOSPITAL ENCOUNTER (OUTPATIENT)
Dept: GENERAL RADIOLOGY | Age: 23
Discharge: HOME | End: 2023-10-25
Payer: COMMERCIAL

## 2023-10-25 VITALS
RESPIRATION RATE: 16 BRPM | TEMPERATURE: 97.1 F | DIASTOLIC BLOOD PRESSURE: 75 MMHG | SYSTOLIC BLOOD PRESSURE: 111 MMHG | HEART RATE: 96 BPM | BODY MASS INDEX: 41.48 KG/M2 | OXYGEN SATURATION: 98 % | WEIGHT: 257 LBS

## 2023-10-25 DIAGNOSIS — S91.332D PUNCTURE WOUND OF LEFT FOOT, SUBSEQUENT ENCOUNTER: Primary | ICD-10-CM

## 2023-10-25 DIAGNOSIS — S91.332D PUNCTURE WOUND OF LEFT FOOT, SUBSEQUENT ENCOUNTER: ICD-10-CM

## 2023-10-25 DIAGNOSIS — M79.672 LEFT FOOT PAIN: ICD-10-CM

## 2023-10-25 PROCEDURE — 99213 OFFICE O/P EST LOW 20 MIN: CPT | Performed by: NURSE PRACTITIONER

## 2023-10-25 PROCEDURE — 73630 X-RAY EXAM OF FOOT: CPT

## 2023-10-25 SDOH — ECONOMIC STABILITY: INCOME INSECURITY: HOW HARD IS IT FOR YOU TO PAY FOR THE VERY BASICS LIKE FOOD, HOUSING, MEDICAL CARE, AND HEATING?: NOT HARD AT ALL

## 2023-10-25 SDOH — ECONOMIC STABILITY: FOOD INSECURITY: WITHIN THE PAST 12 MONTHS, YOU WORRIED THAT YOUR FOOD WOULD RUN OUT BEFORE YOU GOT MONEY TO BUY MORE.: NEVER TRUE

## 2023-10-25 SDOH — ECONOMIC STABILITY: FOOD INSECURITY: WITHIN THE PAST 12 MONTHS, THE FOOD YOU BOUGHT JUST DIDN'T LAST AND YOU DIDN'T HAVE MONEY TO GET MORE.: NEVER TRUE

## 2023-10-25 ASSESSMENT — ANXIETY QUESTIONNAIRES
7. FEELING AFRAID AS IF SOMETHING AWFUL MIGHT HAPPEN: 0
4. TROUBLE RELAXING: 0
GAD7 TOTAL SCORE: 0
6. BECOMING EASILY ANNOYED OR IRRITABLE: 0
IF YOU CHECKED OFF ANY PROBLEMS ON THIS QUESTIONNAIRE, HOW DIFFICULT HAVE THESE PROBLEMS MADE IT FOR YOU TO DO YOUR WORK, TAKE CARE OF THINGS AT HOME, OR GET ALONG WITH OTHER PEOPLE: NOT DIFFICULT AT ALL
5. BEING SO RESTLESS THAT IT IS HARD TO SIT STILL: 0
3. WORRYING TOO MUCH ABOUT DIFFERENT THINGS: 0
1. FEELING NERVOUS, ANXIOUS, OR ON EDGE: 0
2. NOT BEING ABLE TO STOP OR CONTROL WORRYING: 0

## 2023-10-25 ASSESSMENT — COLUMBIA-SUICIDE SEVERITY RATING SCALE - C-SSRS
5. HAVE YOU STARTED TO WORK OUT OR WORKED OUT THE DETAILS OF HOW TO KILL YOURSELF? DO YOU INTEND TO CARRY OUT THIS PLAN?: NO
7. DID THIS OCCUR IN THE LAST THREE MONTHS: NO
4. HAVE YOU HAD THESE THOUGHTS AND HAD SOME INTENTION OF ACTING ON THEM?: NO
3. HAVE YOU BEEN THINKING ABOUT HOW YOU MIGHT KILL YOURSELF?: NO

## 2023-10-25 ASSESSMENT — PATIENT HEALTH QUESTIONNAIRE - PHQ9
8. MOVING OR SPEAKING SO SLOWLY THAT OTHER PEOPLE COULD HAVE NOTICED. OR THE OPPOSITE, BEING SO FIGETY OR RESTLESS THAT YOU HAVE BEEN MOVING AROUND A LOT MORE THAN USUAL: 0
7. TROUBLE CONCENTRATING ON THINGS, SUCH AS READING THE NEWSPAPER OR WATCHING TELEVISION: 0
3. TROUBLE FALLING OR STAYING ASLEEP: 0
4. FEELING TIRED OR HAVING LITTLE ENERGY: 0
SUM OF ALL RESPONSES TO PHQ QUESTIONS 1-9: 0
SUM OF ALL RESPONSES TO PHQ QUESTIONS 1-9: 0
5. POOR APPETITE OR OVEREATING: 0
DEPRESSION UNABLE TO ASSESS: FUNCTIONAL CAPACITY MOTIVATION LIMITS ACCURACY
9. THOUGHTS THAT YOU WOULD BE BETTER OFF DEAD, OR OF HURTING YOURSELF: 0
SUM OF ALL RESPONSES TO PHQ9 QUESTIONS 1 & 2: 0
10. IF YOU CHECKED OFF ANY PROBLEMS, HOW DIFFICULT HAVE THESE PROBLEMS MADE IT FOR YOU TO DO YOUR WORK, TAKE CARE OF THINGS AT HOME, OR GET ALONG WITH OTHER PEOPLE: 0
SUM OF ALL RESPONSES TO PHQ QUESTIONS 1-9: 0
1. LITTLE INTEREST OR PLEASURE IN DOING THINGS: 0
SUM OF ALL RESPONSES TO PHQ QUESTIONS 1-9: 0
2. FEELING DOWN, DEPRESSED OR HOPELESS: 0
6. FEELING BAD ABOUT YOURSELF - OR THAT YOU ARE A FAILURE OR HAVE LET YOURSELF OR YOUR FAMILY DOWN: 0

## 2023-10-26 NOTE — PROGRESS NOTES
10/25/2023     Chief Complaint   Patient presents with    Foot Pain     Stepped on metal charging piece and broke off in foot stated that he pulled out and think a piece is still in foot happened on Friday        Nirmala Piedra (:  2000) is a 26 y.o. adult, here for evaluation of the following medical concerns:  Stepped on a piece of metal attached to laptop cord on 10/19/2023, went to urgent care was given tetanus booster and antibiotic for infection prevention. Patient stepped on this piece of metal while barefoot. States that he removed the piece of metal but it was broken in multiple pieces so is unsure if something is still stuck in the foot. Has more pain than he feels he should at this time, pain is only with weightbearing or pressure being put on the bottom of the left foot. Has to stand to work all day so this is making it difficult. Has not had a fever. Denies any numbness or tingling or skin discoloration of the foot. Foot Injury   The incident occurred 5 to 7 days ago. Injury mechanism: puncture wound. The pain is present in the left foot. The quality of the pain is described as stabbing. The pain has been Intermittent (with weight bearing) since onset. Pertinent negatives include no inability to bear weight, loss of motion, loss of sensation, muscle weakness or tingling. Foreign body present: thinks may have a piece of metal in the foot, states when he removed the piece it was broke up so not sure if he got all of it out. Review of Systems   Musculoskeletal:         Foot pain, see HPI   Neurological:  Negative for tingling. Prior to Visit Medications    Medication Sig Taking?  Authorizing Provider   Isela Dotson 234 MG/1.5ML RALF IM injection INJECT 1 SYRINGE INTRAMUSCULARLY EVERY 4 WEEKS FOR MOOD AND CLEAR THOUGHTS Yes Provider, MD Heydi   doxycycline hyclate (VIBRA-TABS) 100 MG tablet Take 1 tablet by mouth 2 times daily for 7 days Yes Joseph, [address] [facility], DO   INVEGA

## 2023-10-27 ENCOUNTER — NURSE ONLY (OUTPATIENT)
Dept: FAMILY MEDICINE CLINIC | Age: 23
End: 2023-10-27
Payer: COMMERCIAL

## 2023-10-27 DIAGNOSIS — R31.0 GROSS HEMATURIA: Primary | ICD-10-CM

## 2023-10-27 LAB
BILIRUBIN, POC: NEGATIVE
BLOOD URINE, POC: NEGATIVE
CLARITY, POC: NORMAL
COLOR, POC: NORMAL
GLUCOSE URINE, POC: NEGATIVE
KETONES, POC: NEGATIVE
LEUKOCYTE EST, POC: NEGATIVE
NITRITE, POC: NEGATIVE
PH, POC: 7
PROTEIN, POC: NEGATIVE
SPECIFIC GRAVITY, POC: 1.01
UROBILINOGEN, POC: NORMAL

## 2023-10-27 PROCEDURE — 81003 URINALYSIS AUTO W/O SCOPE: CPT | Performed by: NURSE PRACTITIONER

## 2023-10-31 ENCOUNTER — OFFICE VISIT (OUTPATIENT)
Age: 23
End: 2023-10-31
Payer: COMMERCIAL

## 2023-10-31 VITALS
SYSTOLIC BLOOD PRESSURE: 116 MMHG | OXYGEN SATURATION: 99 % | WEIGHT: 257 LBS | DIASTOLIC BLOOD PRESSURE: 78 MMHG | HEART RATE: 88 BPM | HEIGHT: 66 IN | BODY MASS INDEX: 41.3 KG/M2

## 2023-10-31 DIAGNOSIS — R41.3 MEMORY LOSS: Primary | ICD-10-CM

## 2023-10-31 PROCEDURE — 99204 OFFICE O/P NEW MOD 45 MIN: CPT | Performed by: PSYCHIATRY & NEUROLOGY
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
## 2023-10-31 NOTE — PATIENT INSTRUCTIONS
EEG PREP:  1. Patient should take seizure medicine, as prescribed, on the day of the test  2. Patient must NOT have more than 5 hours of sleep prior to the EEG  3. Patient should have CLEAN hair, no hairspray, gel, cream rinse, hair pins, or chemical treatments within 48 hours prior to EEG  4. NO caffeine, pain medications or sedatives on the day of the test (TAKE SEIZURE MEDS!)  5. Arrive 30 minutes prior to appointment time (check in at Registration Desk on 1st floor of hospital main entrance - by the [employer] shop)  6. Procedure will last 60-90 minutes.

## 2023-11-27 ENCOUNTER — HOSPITAL ENCOUNTER (OUTPATIENT)
Dept: MRI IMAGING | Age: 23
Discharge: HOME | End: 2023-11-27
Attending: PSYCHIATRY & NEUROLOGY
Payer: COMMERCIAL

## 2023-11-27 DIAGNOSIS — R41.3 MEMORY LOSS: ICD-10-CM

## 2023-11-27 PROCEDURE — 70551 MRI BRAIN STEM W/O DYE: CPT

## 2023-12-06 ENCOUNTER — OFFICE VISIT (OUTPATIENT)
Age: 23
End: 2023-12-06
Payer: COMMERCIAL

## 2023-12-06 ENCOUNTER — HOSPITAL ENCOUNTER (OUTPATIENT)
Dept: NEUROLOGY | Age: 23
Discharge: HOME | End: 2023-12-06
Payer: COMMERCIAL

## 2023-12-06 VITALS
HEART RATE: 84 BPM | OXYGEN SATURATION: 98 % | DIASTOLIC BLOOD PRESSURE: 80 MMHG | BODY MASS INDEX: 42.59 KG/M2 | HEIGHT: 66 IN | WEIGHT: 265 LBS | SYSTOLIC BLOOD PRESSURE: 118 MMHG

## 2023-12-06 DIAGNOSIS — R41.3 MEMORY LOSS: ICD-10-CM

## 2023-12-06 DIAGNOSIS — R41.3 MEMORY LOSS: Primary | ICD-10-CM

## 2023-12-06 PROCEDURE — 95813 EEG EXTND MNTR 61-119 MIN: CPT | Performed by: PSYCHIATRY & NEUROLOGY

## 2023-12-06 PROCEDURE — 99213 OFFICE O/P EST LOW 20 MIN: CPT | Performed by: PSYCHIATRY & NEUROLOGY

## 2023-12-06 PROCEDURE — 95813 EEG EXTND MNTR 61-119 MIN: CPT
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
## 2024-01-07 ENCOUNTER — HOSPITAL ENCOUNTER (EMERGENCY)
Age: 24
Discharge: HOME | End: 2024-01-07
Attending: EMERGENCY MEDICINE
Payer: COMMERCIAL

## 2024-01-07 VITALS
HEART RATE: 79 BPM | WEIGHT: 270 LBS | OXYGEN SATURATION: 98 % | TEMPERATURE: 99.3 F | SYSTOLIC BLOOD PRESSURE: 138 MMHG | BODY MASS INDEX: 42.38 KG/M2 | DIASTOLIC BLOOD PRESSURE: 91 MMHG | RESPIRATION RATE: 16 BRPM | HEIGHT: 67 IN

## 2024-01-07 DIAGNOSIS — R41.89 ALTERED THOUGHT PROCESSES: Primary | ICD-10-CM

## 2024-01-07 LAB
ALBUMIN SERPL-MCNC: 4.7 G/DL (ref 3.4–5)
ALBUMIN/GLOB SERPL: 2 {RATIO} (ref 1.1–2.2)
ALP SERPL-CCNC: 85 U/L (ref 40–129)
ALT SERPL-CCNC: 19 U/L (ref 10–40)
AMMONIA PLAS-SCNC: 24 UMOL/L (ref 11–51)
AMPHETAMINES UR QL SCN>1000 NG/ML: NORMAL
ANION GAP SERPL CALCULATED.3IONS-SCNC: 12 MMOL/L (ref 3–16)
APAP SERPL-MCNC: <5 UG/ML (ref 10–30)
AST SERPL-CCNC: 21 U/L (ref 15–37)
BARBITURATES UR QL SCN>200 NG/ML: NORMAL
BASOPHILS # BLD: 0.1 K/UL (ref 0–0.2)
BASOPHILS NFR BLD: 0.7 %
BENZODIAZ UR QL SCN>200 NG/ML: NORMAL
BILIRUB SERPL-MCNC: <0.2 MG/DL (ref 0–1)
BILIRUB UR QL STRIP.AUTO: NEGATIVE
BUN SERPL-MCNC: 9 MG/DL (ref 7–20)
CALCIUM SERPL-MCNC: 9.2 MG/DL (ref 8.3–10.6)
CANNABINOIDS UR QL SCN>50 NG/ML: NORMAL
CHLORIDE SERPL-SCNC: 100 MMOL/L (ref 99–110)
CLARITY UR: ABNORMAL
CO2 SERPL-SCNC: 24 MMOL/L (ref 21–32)
COCAINE UR QL SCN: NORMAL
COLOR UR: YELLOW
CREAT SERPL-MCNC: <0.5 MG/DL (ref 0.6–1.1)
DEPRECATED RDW RBC AUTO: 13.8 % (ref 12.4–15.4)
DRUG SCREEN COMMENT UR-IMP: NORMAL
EOSINOPHIL # BLD: 0.1 K/UL (ref 0–0.6)
EOSINOPHIL NFR BLD: 1.2 %
ETHANOLAMINE SERPL-MCNC: NORMAL MG/DL (ref 0–0.08)
FENTANYL SCREEN, URINE: NORMAL
GFR SERPLBLD CREATININE-BSD FMLA CKD-EPI: >60 ML/MIN/{1.73_M2}
GLUCOSE SERPL-MCNC: 136 MG/DL (ref 70–99)
GLUCOSE UR STRIP.AUTO-MCNC: NEGATIVE MG/DL
HCG SERPL QL: NEGATIVE
HCT VFR BLD AUTO: 38.3 % (ref 36–48)
HGB BLD-MCNC: 12.8 G/DL (ref 12–16)
HGB UR QL STRIP.AUTO: NEGATIVE
KETONES UR STRIP.AUTO-MCNC: NEGATIVE MG/DL
LEUKOCYTE ESTERASE UR QL STRIP.AUTO: NEGATIVE
LYMPHOCYTES # BLD: 1.6 K/UL (ref 1–5.1)
LYMPHOCYTES NFR BLD: 18.8 %
MCH RBC QN AUTO: 28.4 PG (ref 26–34)
MCHC RBC AUTO-ENTMCNC: 33.3 G/DL (ref 31–36)
MCV RBC AUTO: 85.4 FL (ref 80–100)
METHADONE UR QL SCN>300 NG/ML: NORMAL
MONOCYTES # BLD: 0.6 K/UL (ref 0–1.3)
MONOCYTES NFR BLD: 7.1 %
NEUTROPHILS # BLD: 6 K/UL (ref 1.7–7.7)
NEUTROPHILS NFR BLD: 72.2 %
NITRITE UR QL STRIP.AUTO: NEGATIVE
OPIATES UR QL SCN>300 NG/ML: NORMAL
OXYCODONE UR QL SCN: NORMAL
PCP UR QL SCN>25 NG/ML: NORMAL
PH UR STRIP.AUTO: 6 [PH] (ref 5–8)
PH UR STRIP: 6 [PH]
PLATELET # BLD AUTO: 362 K/UL (ref 135–450)
PMV BLD AUTO: 6.7 FL (ref 5–10.5)
POTASSIUM SERPL-SCNC: 4.2 MMOL/L (ref 3.5–5.1)
PROT SERPL-MCNC: 7.1 G/DL (ref 6.4–8.2)
PROT UR STRIP.AUTO-MCNC: NEGATIVE MG/DL
RBC # BLD AUTO: 4.49 M/UL (ref 4–5.2)
SALICYLATES SERPL-MCNC: <0.3 MG/DL (ref 15–30)
SODIUM SERPL-SCNC: 136 MMOL/L (ref 136–145)
SP GR UR STRIP.AUTO: 1.02 (ref 1–1.03)
UA COMPLETE W REFLEX CULTURE PNL UR: ABNORMAL
UA DIPSTICK W REFLEX MICRO PNL UR: ABNORMAL
URN SPEC COLLECT METH UR: ABNORMAL
UROBILINOGEN UR STRIP-ACNC: 0.2 E.U./DL
WBC # BLD AUTO: 8.4 K/UL (ref 4–11)

## 2024-01-07 PROCEDURE — 84703 CHORIONIC GONADOTROPIN ASSAY: CPT

## 2024-01-07 PROCEDURE — 82077 ASSAY SPEC XCP UR&BREATH IA: CPT

## 2024-01-07 PROCEDURE — 80143 DRUG ASSAY ACETAMINOPHEN: CPT

## 2024-01-07 PROCEDURE — 80053 COMPREHEN METABOLIC PANEL: CPT

## 2024-01-07 PROCEDURE — 82140 ASSAY OF AMMONIA: CPT

## 2024-01-07 PROCEDURE — 80307 DRUG TEST PRSMV CHEM ANLYZR: CPT

## 2024-01-07 PROCEDURE — 81003 URINALYSIS AUTO W/O SCOPE: CPT

## 2024-01-07 PROCEDURE — 36415 COLL VENOUS BLD VENIPUNCTURE: CPT

## 2024-01-07 PROCEDURE — 99283 EMERGENCY DEPT VISIT LOW MDM: CPT

## 2024-01-07 PROCEDURE — 80179 DRUG ASSAY SALICYLATE: CPT

## 2024-01-07 PROCEDURE — 85025 COMPLETE CBC W/AUTO DIFF WBC: CPT

## 2024-01-07 ASSESSMENT — ENCOUNTER SYMPTOMS
ABDOMINAL PAIN: 0
COLOR CHANGE: 0
SHORTNESS OF BREATH: 0
FACIAL SWELLING: 0
RHINORRHEA: 0
SORE THROAT: 0

## 2024-01-07 ASSESSMENT — PAIN - FUNCTIONAL ASSESSMENT: PAIN_FUNCTIONAL_ASSESSMENT: NONE - DENIES PAIN

## 2024-01-08 ENCOUNTER — OFFICE VISIT (OUTPATIENT)
Dept: FAMILY MEDICINE CLINIC | Age: 24
End: 2024-01-08
Payer: COMMERCIAL

## 2024-01-08 VITALS
HEART RATE: 83 BPM | OXYGEN SATURATION: 98 % | DIASTOLIC BLOOD PRESSURE: 82 MMHG | HEIGHT: 67 IN | WEIGHT: 272 LBS | TEMPERATURE: 97.1 F | RESPIRATION RATE: 16 BRPM | BODY MASS INDEX: 42.69 KG/M2 | SYSTOLIC BLOOD PRESSURE: 118 MMHG

## 2024-01-08 DIAGNOSIS — E11.9 TYPE 2 DIABETES MELLITUS WITHOUT COMPLICATION, WITHOUT LONG-TERM CURRENT USE OF INSULIN (HCC): ICD-10-CM

## 2024-01-08 DIAGNOSIS — R11.0 NAUSEA: ICD-10-CM

## 2024-01-08 DIAGNOSIS — F25.0 SCHIZOAFFECTIVE DISORDER, BIPOLAR TYPE (HCC): ICD-10-CM

## 2024-01-08 DIAGNOSIS — R73.9 ELEVATED BLOOD SUGAR: ICD-10-CM

## 2024-01-08 DIAGNOSIS — R40.4 TRANSIENT ALTERATION OF AWARENESS: Primary | ICD-10-CM

## 2024-01-08 LAB — HBA1C MFR BLD: 6.2 %

## 2024-01-08 PROCEDURE — 83036 HEMOGLOBIN GLYCOSYLATED A1C: CPT | Performed by: STUDENT IN AN ORGANIZED HEALTH CARE EDUCATION/TRAINING PROGRAM

## 2024-01-08 PROCEDURE — 3044F HG A1C LEVEL LT 7.0%: CPT | Performed by: STUDENT IN AN ORGANIZED HEALTH CARE EDUCATION/TRAINING PROGRAM

## 2024-01-08 PROCEDURE — 99214 OFFICE O/P EST MOD 30 MIN: CPT | Performed by: STUDENT IN AN ORGANIZED HEALTH CARE EDUCATION/TRAINING PROGRAM

## 2024-01-08 RX ORDER — ONDANSETRON 8 MG/1
8 TABLET, ORALLY DISINTEGRATING ORAL DAILY PRN
Qty: 4 TABLET | Refills: 0 | Status: SHIPPED | OUTPATIENT
Start: 2024-01-08

## 2024-01-08 RX ORDER — METFORMIN HYDROCHLORIDE 500 MG/1
500 TABLET, EXTENDED RELEASE ORAL
Qty: 90 TABLET | Refills: 0 | Status: SHIPPED | OUTPATIENT
Start: 2024-01-08

## 2024-01-08 RX ORDER — HYDROXYZINE 50 MG/1
50 TABLET, FILM COATED ORAL
COMMUNITY

## 2024-01-08 RX ORDER — OLANZAPINE 2.5 MG/1
2.5 TABLET, FILM COATED ORAL
COMMUNITY
Start: 2023-12-05

## 2024-01-08 SDOH — ECONOMIC STABILITY: FOOD INSECURITY: WITHIN THE PAST 12 MONTHS, THE FOOD YOU BOUGHT JUST DIDN'T LAST AND YOU DIDN'T HAVE MONEY TO GET MORE.: NEVER TRUE

## 2024-01-08 SDOH — ECONOMIC STABILITY: FOOD INSECURITY: WITHIN THE PAST 12 MONTHS, YOU WORRIED THAT YOUR FOOD WOULD RUN OUT BEFORE YOU GOT MONEY TO BUY MORE.: NEVER TRUE

## 2024-01-08 SDOH — ECONOMIC STABILITY: INCOME INSECURITY: HOW HARD IS IT FOR YOU TO PAY FOR THE VERY BASICS LIKE FOOD, HOUSING, MEDICAL CARE, AND HEATING?: NOT HARD AT ALL

## 2024-01-08 ASSESSMENT — PATIENT HEALTH QUESTIONNAIRE - PHQ9
9. THOUGHTS THAT YOU WOULD BE BETTER OFF DEAD, OR OF HURTING YOURSELF: 0
7. TROUBLE CONCENTRATING ON THINGS, SUCH AS READING THE NEWSPAPER OR WATCHING TELEVISION: 0
3. TROUBLE FALLING OR STAYING ASLEEP: 0
10. IF YOU CHECKED OFF ANY PROBLEMS, HOW DIFFICULT HAVE THESE PROBLEMS MADE IT FOR YOU TO DO YOUR WORK, TAKE CARE OF THINGS AT HOME, OR GET ALONG WITH OTHER PEOPLE: 0
6. FEELING BAD ABOUT YOURSELF - OR THAT YOU ARE A FAILURE OR HAVE LET YOURSELF OR YOUR FAMILY DOWN: 0
8. MOVING OR SPEAKING SO SLOWLY THAT OTHER PEOPLE COULD HAVE NOTICED. OR THE OPPOSITE, BEING SO FIGETY OR RESTLESS THAT YOU HAVE BEEN MOVING AROUND A LOT MORE THAN USUAL: 0
2. FEELING DOWN, DEPRESSED OR HOPELESS: 0
1. LITTLE INTEREST OR PLEASURE IN DOING THINGS: 0
SUM OF ALL RESPONSES TO PHQ QUESTIONS 1-9: 0
5. POOR APPETITE OR OVEREATING: 0
SUM OF ALL RESPONSES TO PHQ9 QUESTIONS 1 & 2: 0
4. FEELING TIRED OR HAVING LITTLE ENERGY: 0

## 2024-01-08 NOTE — PROGRESS NOTES
[facility]  [address]  [address]  [place], [place] [address]  Tel: [phone]      2024   SUBJECTIVE/OBJECTIVE  HPI    Nirmala Piedra (:  2000) is a 23 y.o. adult, here for evaluation of the following medical concerns:  Chief Complaint   Patient presents with    Follow-Up from Hospital     ER F/U [facility]      Patient is a 22 y.o. adult  has a past medical history of Anxiety, Bipolar 1 disorder (HCC), Borderline personality disorder (HCC), Bulimia, Depression, Gender dysphoria in adult, Hypertriglyceridemia, Potential for intentional self-harm, PTSD (post-traumatic stress disorder), PTSD (post-traumatic stress disorder), Schizoaffective disorder, bipolar type (HCC), Suicide attempt (HCC), and Type 2 diabetes mellitus, without long-term current use of insulin ([facility]). who presents ER follow-up.  Patient was seen yesterday in the emergency room, with difficulty thinking and focusing.  Unable to eat or comprehend menu.  No pain.  No SI or HI.  Blood pressure was elevated, speech was noted to be slow.  Reports abnormal movement with ticks and shaking.  Labs were normal, including : Salicylate level, acetaminophen level, alcohol , ammonia , beta-hCG negative, CBC, CMP within normal limits.  Drug screen and urine analysis screen negative.  Glucose slightly elevated    Today patient reports feeling better, but started to feel nauseous, headaches and \"brain stopped working\" for a minute. Associated blurry vision, no hearing changes. Third episode.  Reports that he was told he was blinking a lot and not there. \"Things were happening around him, but felt stuck\". No weakness, numbness or tingling. Occurred few hours ago, last night and the night before.  Also endorses sweet taste in mouth.  Mental Health Problem  No recent medication changes, on Fluoxetine 20 mg daily,  Zyprexa 2.5 mg as needed daily for hallucinations, was taking once weekly, hydroxyzine as needed.   No longer

## 2024-01-08 NOTE — ED PROVIDER NOTES
In addition to the advanced practice provider, I personally saw Nirmala Piedra and performed a substantive portion of the visit including all aspects of the medical decision making.    Medical Decision Making  23-year-old male PMH including PTSD, schizoaffective order, bipolar 1 disorder, borderline personality disorder, depression, anxiety presents with fiancé at bedside stating \"I just do not feel right\".  Patient states that she has had increased difficulty focusing.  States she was at a restaurant earlier today and had difficulty reading a menu.  States symptom onset approximately was yesterday.  States she has had a history of similar symptoms in the past and difficulty with memory.  Denies known history of seizures or epilepsy.  Denies recent falls or head injuries.  Denies SI or HI.  Denies alcohol or drug use.  States she was recently started on Invega injections approximately 1 month ago.  Denies other neurologic symptoms.  On exam, patient is responding appropriately to questioning but is slow to respond.  There are no focal deficits, affect is flat.  Exam is otherwise unremarkable.  Patient was vitally stable during course, afebrile.  Labs are within normal limits.  UDS, ethanol are negative.  On chart review, patient does have a history of multiple encounters for similar symptoms.  Patient did have an MRI brain without contrast approximately 1 month ago that was normal.  States she has seen a neurologist in the past.  At this time low suspicion for acute neurologic etiology of symptoms given presentation, exam, history, reassuring workup.  Do not feel that repeat imaging is indicated given recent MRI brain was normal.  Patient was offered behavioral health evaluation but declines.  At this time feel that patient is stable for discharge with plan for close follow-up with her PCP and specialist as scheduled.  Recommended follow-up and return to ED with any new or worsening symptoms.          SEP-1  Is this 
time of this note:    No orders to display     No results found.  No results found.  No results found.      PROCEDURES   Unless otherwise noted below, none     Procedures     CRITICAL CARE TIME   Unless otherwise noted below, none          EMERGENCYDEPARTMENT COURSE/MDM:     Vitals:    Vitals:    01/07/24 1838   BP: (!) 180/90   Pulse: 86   Resp: 18   Temp: 99.3 °F (37.4 °C)   TempSrc: Oral   SpO2: 100%   Weight: 122.5 kg (270 lb)   Height: 1.702 m (5' 7\")         Patient was seen and evaluated by myself and Dr. Wesley.  Patient here for complaint of just not feeling well.  Patient states that has been confused and unable to comprehend things.  She reports that this has been going on since yesterday.  Patient referred to her friend to provide history however when the patient was asked her allergies she was able to list on her medications that she is allergic to and then she is also able to list all the pharmacies that she receives her medications from and was then able to answer questions.  At the time of my exam she answered intermediately.  I was able to review her chart patient has been seen multiple times in the ED.  She also has a history of alcohol and psychosis.  Patient currently denies any suicidal or homicidal ideations.  Patient did have MRI done 11/28/2023 was negative for brain.  Labs been ordered and are currently pending.    On reevaluation the patient is able to answer questions.  Labs have all been reviewed and interpreted.  At this point I do not feel that this patient is having a medical issue.  I did offer her a consult for psychiatry however she declines.  Patient states that she has neurology and counselor resources at home.  She states that she is ready and okay to be discharged.  She was given instructions to follow-up with her primary care doctor in the next 2 days return to ED for worsening symptoms peer patient ultimately discharged with all questions answered.      Patient was given the

## 2024-01-08 NOTE — PATIENT INSTRUCTIONS
Take Metformin  mg daily , follow up 3- 6 months   A1c 6.2%   Try Zofran for nausea. If recurrent  No more than once daily for 4 days   Follow up with psychiatry for mental status changes        What are blood sugar targets?  Before a meal: 80 to 130 mg/dL.  Two hours after the start of a meal: Less than 180 mg/dL.

## 2024-01-17 ENCOUNTER — TELEPHONE (OUTPATIENT)
Dept: FAMILY MEDICINE CLINIC | Age: 24
End: 2024-01-17

## 2024-01-17 NOTE — TELEPHONE ENCOUNTER
Patient calling in states earlier today she started peeing blood, now has vomited 3 times and there was blood in it. States she is having pretty bad stomach pain, Patient is going to go to the ER.

## 2024-01-19 ENCOUNTER — OFFICE VISIT (OUTPATIENT)
Dept: FAMILY MEDICINE CLINIC | Age: 24
End: 2024-01-19
Payer: COMMERCIAL

## 2024-01-19 VITALS
WEIGHT: 276 LBS | HEIGHT: 67 IN | DIASTOLIC BLOOD PRESSURE: 79 MMHG | TEMPERATURE: 97.1 F | OXYGEN SATURATION: 99 % | RESPIRATION RATE: 16 BRPM | HEART RATE: 79 BPM | BODY MASS INDEX: 43.32 KG/M2 | SYSTOLIC BLOOD PRESSURE: 119 MMHG

## 2024-01-19 DIAGNOSIS — K59.00 CONSTIPATION, UNSPECIFIED CONSTIPATION TYPE: ICD-10-CM

## 2024-01-19 DIAGNOSIS — K21.9 GASTROESOPHAGEAL REFLUX DISEASE WITHOUT ESOPHAGITIS: ICD-10-CM

## 2024-01-19 DIAGNOSIS — K92.0 HEMATEMESIS WITH NAUSEA: ICD-10-CM

## 2024-01-19 DIAGNOSIS — N70.93 TUBO-OVARIAN ABSCESS: Primary | ICD-10-CM

## 2024-01-19 PROCEDURE — 99214 OFFICE O/P EST MOD 30 MIN: CPT | Performed by: STUDENT IN AN ORGANIZED HEALTH CARE EDUCATION/TRAINING PROGRAM

## 2024-01-19 RX ORDER — DOXYCYCLINE HYCLATE 100 MG
100 TABLET ORAL 2 TIMES DAILY
COMMUNITY
Start: 2024-01-18 | End: 2024-02-01

## 2024-01-19 RX ORDER — METRONIDAZOLE 500 MG/1
500 TABLET ORAL 2 TIMES DAILY
COMMUNITY
Start: 2024-01-18 | End: 2024-02-01

## 2024-01-19 RX ORDER — OMEPRAZOLE 20 MG/1
20 CAPSULE, DELAYED RELEASE ORAL
Qty: 60 CAPSULE | Refills: 5 | Status: SHIPPED | OUTPATIENT
Start: 2024-01-19

## 2024-01-19 SDOH — ECONOMIC STABILITY: FOOD INSECURITY: WITHIN THE PAST 12 MONTHS, YOU WORRIED THAT YOUR FOOD WOULD RUN OUT BEFORE YOU GOT MONEY TO BUY MORE.: NEVER TRUE

## 2024-01-19 SDOH — ECONOMIC STABILITY: FOOD INSECURITY: WITHIN THE PAST 12 MONTHS, THE FOOD YOU BOUGHT JUST DIDN'T LAST AND YOU DIDN'T HAVE MONEY TO GET MORE.: NEVER TRUE

## 2024-01-19 SDOH — ECONOMIC STABILITY: INCOME INSECURITY: HOW HARD IS IT FOR YOU TO PAY FOR THE VERY BASICS LIKE FOOD, HOUSING, MEDICAL CARE, AND HEATING?: NOT HARD AT ALL

## 2024-01-19 ASSESSMENT — PATIENT HEALTH QUESTIONNAIRE - PHQ9
SUM OF ALL RESPONSES TO PHQ QUESTIONS 1-9: 0
7. TROUBLE CONCENTRATING ON THINGS, SUCH AS READING THE NEWSPAPER OR WATCHING TELEVISION: 0
SUM OF ALL RESPONSES TO PHQ QUESTIONS 1-9: 0
9. THOUGHTS THAT YOU WOULD BE BETTER OFF DEAD, OR OF HURTING YOURSELF: 0
4. FEELING TIRED OR HAVING LITTLE ENERGY: 0
10. IF YOU CHECKED OFF ANY PROBLEMS, HOW DIFFICULT HAVE THESE PROBLEMS MADE IT FOR YOU TO DO YOUR WORK, TAKE CARE OF THINGS AT HOME, OR GET ALONG WITH OTHER PEOPLE: 0
SUM OF ALL RESPONSES TO PHQ QUESTIONS 1-9: 0
3. TROUBLE FALLING OR STAYING ASLEEP: 0
2. FEELING DOWN, DEPRESSED OR HOPELESS: 0
SUM OF ALL RESPONSES TO PHQ QUESTIONS 1-9: 0
8. MOVING OR SPEAKING SO SLOWLY THAT OTHER PEOPLE COULD HAVE NOTICED. OR THE OPPOSITE, BEING SO FIGETY OR RESTLESS THAT YOU HAVE BEEN MOVING AROUND A LOT MORE THAN USUAL: 0
SUM OF ALL RESPONSES TO PHQ9 QUESTIONS 1 & 2: 0
1. LITTLE INTEREST OR PLEASURE IN DOING THINGS: 0
5. POOR APPETITE OR OVEREATING: 0
6. FEELING BAD ABOUT YOURSELF - OR THAT YOU ARE A FAILURE OR HAVE LET YOURSELF OR YOUR FAMILY DOWN: 0

## 2024-01-19 NOTE — PATIENT INSTRUCTIONS
Try omeprazole 20 mg twice daily   Advised patient to take MiraLAX daily to maintain soft stools.   Once pt has regular bowel movements start diet w/ high fiber content.  Advised to increase fiber to 20 to 35 g daily monitor signs for bloating and distention  Increase fluid intake to 64oz a day  Walk is much as possible  Plenty of fluids.  Return if symptoms fail to improve or worsen.

## 2024-01-19 NOTE — PROGRESS NOTES
[facility]  [address]  [address]  [place], [place] [address]  Tel: [phone]      2024   SUBJECTIVE/OBJECTIVE  HPI    Nirmala Piedra (:  2000) is a 23 y.o. adult, here for evaluation of the following medical concerns:  Chief Complaint   Patient presents with    Follow-Up from Hospital     [facility] F/U   Patient is a 23 y.o. adult  has a past medical history of Anxiety, Bipolar 1 disorder (HCC), Borderline personality disorder (HCC), Bulimia, Depression,  and Type 2 diabetes mellitus, without long-term current use of insulin (HCC). who presents for hospital follow-up.    Patient presented to the emergency room on 24 reporting blood in the urine, stools and emesis.  Lab work included  lipase, hepatic function panel within normal limits,   FOBT negative  CBC mild anemia, BMP sodium 133,  Gonorrhea chlamydia negative, Negative for yeast, clue cells, trichomonas ; pregnancy test negative   UA positive for blood +2,  negative for nitrates and leukocytes  CT abdomen pelvis showed Thick walled right adnexal lesion measuring 4.9 x 5.8 cm thick walled cyst is more likely than tubo-ovarian abscess with trace adjacent free fluid indicating possible leakage.  Endometrioma is another possibility.      It was determined that blood in stool and urine most likely from vaginal bleeding.  Patient was started on antibiotics for suspected tubo-ovarian abscess. Patient was discharged home with doxycycline twice daily for 14 days, Flagyl 500 mg twice daily for 14 days.  Patient was advised to go to another hospital for imaging, but was unable to find transportation.    Vaginal bleeding/Abscess   Patient endorses light vaginal bleeding, similar to spotting.  Pain on right lower quadrant, 6-7/10 in severity.  History of irregular periods, last LMP 8 months ago due to Invega shots, resulting in hyperprolactinemia.  Endorses constant cramping.  Endorses some urinary urgency

## 2024-01-24 ENCOUNTER — TELEPHONE (OUTPATIENT)
Dept: ADMINISTRATIVE | Age: 24
End: 2024-01-24

## 2024-01-24 NOTE — TELEPHONE ENCOUNTER
Submitted PA for Omeprazole 20MG dr capsules  [facility]: [id number] STATUS: PENDING.    Follow up done daily; if no decision with in three days we will refax.  If another three days goes by with no decision will call the insurance for status.

## 2024-01-25 NOTE — TELEPHONE ENCOUNTER
CaseId:[id number];Status:Approved;Review Type:Prior Auth;Coverage Start Date:12/25/2023;Coverage End Date:01/23/2025.    Please notify patient. Thank you.

## 2024-01-26 ENCOUNTER — TELEPHONE (OUTPATIENT)
Dept: OBGYN CLINIC | Age: 24
End: 2024-01-26

## 2024-01-26 NOTE — TELEPHONE ENCOUNTER
Called patient made her aware I will start the PA with her insurance as soon as her last note is complete.       JHONY

## 2024-01-26 NOTE — TELEPHONE ENCOUNTER
Patient calling to check on surgery. When she was in last Dr. Blakely said that someone will be in touch to schedule.    Routing to Dr. Blakely.

## 2024-01-29 NOTE — TELEPHONE ENCOUNTER
Surgery requires a PA started today   Pending auth # [id number]  Called made patient aware     JHONY

## 2024-01-30 ENCOUNTER — PREP FOR PROCEDURE (OUTPATIENT)
Dept: OBGYN CLINIC | Age: 24
End: 2024-01-30

## 2024-01-30 RX ORDER — SODIUM CHLORIDE 0.9 % (FLUSH) 0.9 %
5-40 SYRINGE (ML) INJECTION EVERY 12 HOURS SCHEDULED
Status: CANCELLED | OUTPATIENT
Start: 2024-01-30

## 2024-01-30 RX ORDER — SODIUM CHLORIDE 0.9 % (FLUSH) 0.9 %
5-40 SYRINGE (ML) INJECTION PRN
Status: CANCELLED | OUTPATIENT
Start: 2024-01-30

## 2024-01-30 RX ORDER — SODIUM CHLORIDE 9 MG/ML
INJECTION, SOLUTION INTRAVENOUS PRN
Status: CANCELLED | OUTPATIENT
Start: 2024-01-30

## 2024-01-30 RX ORDER — SODIUM CHLORIDE, SODIUM LACTATE, POTASSIUM CHLORIDE, CALCIUM CHLORIDE 600; 310; 30; 20 MG/100ML; MG/100ML; MG/100ML; MG/100ML
INJECTION, SOLUTION INTRAVENOUS CONTINUOUS
Status: CANCELLED | OUTPATIENT
Start: 2024-01-30

## 2024-01-30 NOTE — TELEPHONE ENCOUNTER
Patient scheduled for surgery on 2-27-24  @ 9am in the Main OR,   Orders will need placed in epic      FYI

## 2024-02-02 ENCOUNTER — HOSPITAL ENCOUNTER (EMERGENCY)
Age: 24
Discharge: HOME | End: 2024-02-02
Attending: EMERGENCY MEDICINE
Payer: COMMERCIAL

## 2024-02-02 ENCOUNTER — OFFICE VISIT (OUTPATIENT)
Dept: URGENT CARE | Age: 24
End: 2024-02-02

## 2024-02-02 VITALS
DIASTOLIC BLOOD PRESSURE: 81 MMHG | TEMPERATURE: 98.3 F | OXYGEN SATURATION: 100 % | WEIGHT: 270 LBS | RESPIRATION RATE: 16 BRPM | HEIGHT: 67 IN | BODY MASS INDEX: 42.38 KG/M2 | SYSTOLIC BLOOD PRESSURE: 128 MMHG | HEART RATE: 81 BPM

## 2024-02-02 VITALS
OXYGEN SATURATION: 98 % | HEIGHT: 67 IN | TEMPERATURE: 98.4 F | DIASTOLIC BLOOD PRESSURE: 89 MMHG | WEIGHT: 275 LBS | SYSTOLIC BLOOD PRESSURE: 146 MMHG | HEART RATE: 82 BPM | BODY MASS INDEX: 43.16 KG/M2

## 2024-02-02 DIAGNOSIS — R07.9 CHEST PAIN, UNSPECIFIED TYPE: Primary | ICD-10-CM

## 2024-02-02 DIAGNOSIS — R42 LIGHTHEADEDNESS: ICD-10-CM

## 2024-02-02 DIAGNOSIS — R42 DIZZINESS: ICD-10-CM

## 2024-02-02 DIAGNOSIS — R07.89 CHEST TIGHTNESS: Primary | ICD-10-CM

## 2024-02-02 LAB
ALBUMIN SERPL-MCNC: 4.6 G/DL (ref 3.4–5)
ALBUMIN/GLOB SERPL: 1.7 {RATIO} (ref 1.1–2.2)
ALP SERPL-CCNC: 86 U/L (ref 40–129)
ALT SERPL-CCNC: 20 U/L (ref 10–40)
ANION GAP SERPL CALCULATED.3IONS-SCNC: 16 MMOL/L (ref 3–16)
AST SERPL-CCNC: 16 U/L (ref 15–37)
BASE EXCESS BLDV CALC-SCNC: -2 MMOL/L (ref -3–3)
BASOPHILS # BLD: 0 K/UL (ref 0–0.2)
BASOPHILS NFR BLD: 0.5 %
BILIRUB SERPL-MCNC: <0.2 MG/DL (ref 0–1)
BUN SERPL-MCNC: 11 MG/DL (ref 7–20)
CALCIUM SERPL-MCNC: 8.8 MG/DL (ref 8.3–10.6)
CHLORIDE SERPL-SCNC: 97 MMOL/L (ref 99–110)
CO2 BLDV-SCNC: 23 MMOL/L
CO2 SERPL-SCNC: 21 MMOL/L (ref 21–32)
COHGB MFR BLDV: 3.4 % (ref 0–1.5)
CREAT SERPL-MCNC: 0.6 MG/DL (ref 0.6–1.1)
DEPRECATED RDW RBC AUTO: 13.6 % (ref 12.4–15.4)
EOSINOPHIL # BLD: 0.1 K/UL (ref 0–0.6)
EOSINOPHIL NFR BLD: 1 %
GFR SERPLBLD CREATININE-BSD FMLA CKD-EPI: >60 ML/MIN/{1.73_M2}
GLUCOSE SERPL-MCNC: 117 MG/DL (ref 70–99)
HCG SERPL QL: NEGATIVE
HCO3 BLDV-SCNC: 22.2 MMOL/L (ref 23–29)
HCT VFR BLD AUTO: 37.1 % (ref 36–48)
HGB BLD-MCNC: 12.2 G/DL (ref 12–16)
LYMPHOCYTES # BLD: 1.5 K/UL (ref 1–5.1)
LYMPHOCYTES NFR BLD: 16.4 %
MCH RBC QN AUTO: 28.4 PG (ref 26–34)
MCHC RBC AUTO-ENTMCNC: 33 G/DL (ref 31–36)
MCV RBC AUTO: 86.2 FL (ref 80–100)
METHGB MFR BLDV: 0.3 %
MONOCYTES # BLD: 0.5 K/UL (ref 0–1.3)
MONOCYTES NFR BLD: 5.6 %
NEUTROPHILS # BLD: 7.2 K/UL (ref 1.7–7.7)
NEUTROPHILS NFR BLD: 76.5 %
O2 THERAPY: ABNORMAL
PCO2 BLDV: 36.2 MMHG (ref 40–50)
PH BLDV: 7.41 [PH] (ref 7.35–7.45)
PLATELET # BLD AUTO: 399 K/UL (ref 135–450)
PMV BLD AUTO: 6.7 FL (ref 5–10.5)
PO2 BLDV: 61.6 MMHG (ref 25–40)
POTASSIUM SERPL-SCNC: 4.1 MMOL/L (ref 3.5–5.1)
PROT SERPL-MCNC: 7.3 G/DL (ref 6.4–8.2)
RBC # BLD AUTO: 4.3 M/UL (ref 4–5.2)
SAO2 % BLDV: 92 %
SODIUM SERPL-SCNC: 134 MMOL/L (ref 136–145)
TROPONIN, HIGH SENSITIVITY: <6 NG/L (ref 0–14)
WBC # BLD AUTO: 9.4 K/UL (ref 4–11)

## 2024-02-02 PROCEDURE — 84703 CHORIONIC GONADOTROPIN ASSAY: CPT

## 2024-02-02 PROCEDURE — 93005 ELECTROCARDIOGRAM TRACING: CPT | Performed by: EMERGENCY MEDICINE

## 2024-02-02 PROCEDURE — 80053 COMPREHEN METABOLIC PANEL: CPT

## 2024-02-02 PROCEDURE — 85025 COMPLETE CBC W/AUTO DIFF WBC: CPT

## 2024-02-02 PROCEDURE — 99284 EMERGENCY DEPT VISIT MOD MDM: CPT

## 2024-02-02 PROCEDURE — 82803 BLOOD GASES ANY COMBINATION: CPT

## 2024-02-02 PROCEDURE — 84484 ASSAY OF TROPONIN QUANT: CPT

## 2024-02-02 ASSESSMENT — ENCOUNTER SYMPTOMS
SORE THROAT: 0
EYE REDNESS: 0
DIARRHEA: 0
VOMITING: 0
SHORTNESS OF BREATH: 1
ABDOMINAL PAIN: 0
COUGH: 0
EYE DISCHARGE: 0
NAUSEA: 1
EYE PAIN: 0

## 2024-02-02 ASSESSMENT — PAIN - FUNCTIONAL ASSESSMENT: PAIN_FUNCTIONAL_ASSESSMENT: NONE - DENIES PAIN
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
## 2024-02-02 NOTE — PATIENT INSTRUCTIONS
- Pt was sent to ER by ambulance to [facility] for further evaluation and treatment due to concerning symptoms and complicated past medical hx
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
## 2024-02-03 LAB
EKG ATRIAL RATE: 82 BPM
EKG DIAGNOSIS: NORMAL
EKG P AXIS: 25 DEGREES
EKG P-R INTERVAL: 144 MS
EKG Q-T INTERVAL: 368 MS
EKG QRS DURATION: 68 MS
EKG QTC CALCULATION (BAZETT): 429 MS
EKG R AXIS: 13 DEGREES
EKG T AXIS: 31 DEGREES
EKG VENTRICULAR RATE: 82 BPM

## 2024-02-03 PROCEDURE — 93010 ELECTROCARDIOGRAM REPORT: CPT | Performed by: INTERNAL MEDICINE

## 2024-02-03 ASSESSMENT — ENCOUNTER SYMPTOMS
VOMITING: 0
ABDOMINAL PAIN: 0
SHORTNESS OF BREATH: 0
SORE THROAT: 0
CHEST TIGHTNESS: 1
NAUSEA: 1

## 2024-02-03 NOTE — DISCHARGE INSTRUCTIONS
Have your car checked for any possible leak in the fuel line.    Make sure  if you do drive only make it is only short distances and have the windows open until making sure there is no leak.    Follow-up with primary doctor in 3 to 5 days for any other concerns.    Return to the emergency department over the next 12 to 24 hours for any worsening headache with vomiting, numbness or weakness on one side of the body, high fever, chest pain with shortness of breath, lightheadedness with passing out, or any other concerns.

## 2024-02-19 ENCOUNTER — OFFICE VISIT (OUTPATIENT)
Dept: OBGYN CLINIC | Age: 24
End: 2024-02-19

## 2024-02-19 VITALS
HEART RATE: 78 BPM | HEIGHT: 67 IN | BODY MASS INDEX: 43.66 KG/M2 | WEIGHT: 278.2 LBS | OXYGEN SATURATION: 98 % | DIASTOLIC BLOOD PRESSURE: 84 MMHG | SYSTOLIC BLOOD PRESSURE: 132 MMHG

## 2024-02-19 DIAGNOSIS — Z78.9 FEMALE-TO-MALE TRANSGENDER PERSON: ICD-10-CM

## 2024-02-19 DIAGNOSIS — N83.8 OVARIAN MASS, RIGHT: ICD-10-CM

## 2024-02-19 DIAGNOSIS — R10.2 PELVIC PAIN: ICD-10-CM

## 2024-02-19 DIAGNOSIS — N83.201 CYST OF RIGHT OVARY: ICD-10-CM

## 2024-02-19 DIAGNOSIS — Z01.818 PRE-OP EXAM: Primary | ICD-10-CM

## 2024-02-19 PROCEDURE — 99024 POSTOP FOLLOW-UP VISIT: CPT | Performed by: OBSTETRICS & GYNECOLOGY

## 2024-02-19 NOTE — PROGRESS NOTES
[facility]   Pre Op Visit     CC:   Chief Complaint   Patient presents with    Pre-op Exam       HPI:  23 y.o.  who presents to [facility] for Pre-Op visit.   Pt doing well. No changes since last exam . RBA to procedure reviewed / consents signed. All questions answered. PCP to clear pt for surgery tomorrow.     Review of Systems - The following ROS was otherwise negative, except as noted in the HPI: constitutional, respiratory, cardiovascular, gastrointestinal, genitourinary    Objective:  Vitals:    24 0852   BP: 132/84   Site: Left Upper Arm   Position: Sitting   Cuff Size: Large Adult   Pulse: 78   SpO2: 98%   Weight: 126.2 kg (278 lb 3.2 oz)   Height: 1.702 m (5' 7\")     General: Alert, well appearing, no acute distress   Resp effort within normal limits   Abdomen: Soft, nontender, nondistended   Pelvic: Deferred  Extremities: No redness or tenderness, neg Pamella's sign  Osteopathic: no TART changes    Impression     1. Complex right ovarian cyst. Hemorrhagic ovarian cyst, endometrioma can be considered. Correlation with serum hCG level is recommended as a conservative measure  2. Trace amount of free fluid, simple    Electronically Signed by: Lavon Roy MD, 2024 2:35 PM  Narrative     EXAM: ULTRASOUND PELVIS    INDICATION: possible ovarian torsion right  concerned for torsion pain    COMPARISON: None    TECHNIQUE: Transabdominal and transvaginal scanning,        FINDINGS:    UTERUS:  *  Size: 7.2 x 4.2 x 3.9 cm  *  Orientation: Anteverted.  *  Masses: None.  *  Cervix: Normal.  *  Endometrium: 11 mm thickness. Normal for patient's age    FREE FLUID: Trace amount of free fluid.    RIGHT OVARY: 6.8 x 4.6 x 5.0 cm.     Complex cyst measuring 4.2-4 0.4 x 3.4 cm noted on previous CT measuring 4.9 x 5.8 cm      LEFT OVARY: 3.1 x 2.7 x 2.3 cm.  Normal    DUPLEX: No.  Procedure Note     Lavon Roy MD - 2024  Formatting of this note might be different from the

## 2024-02-20 RX ORDER — M-VIT,TX,IRON,MINS/CALC/FOLIC 27MG-0.4MG
1 TABLET ORAL DAILY
COMMUNITY

## 2024-02-21 ENCOUNTER — OFFICE VISIT (OUTPATIENT)
Dept: FAMILY MEDICINE CLINIC | Age: 24
End: 2024-02-21
Payer: COMMERCIAL

## 2024-02-21 VITALS
BODY MASS INDEX: 43.38 KG/M2 | OXYGEN SATURATION: 99 % | DIASTOLIC BLOOD PRESSURE: 82 MMHG | HEART RATE: 85 BPM | SYSTOLIC BLOOD PRESSURE: 102 MMHG | RESPIRATION RATE: 16 BRPM | TEMPERATURE: 97.3 F | WEIGHT: 277 LBS

## 2024-02-21 DIAGNOSIS — Z01.818 PREOP EXAMINATION: ICD-10-CM

## 2024-02-21 DIAGNOSIS — E66.01 CLASS 3 SEVERE OBESITY WITH SERIOUS COMORBIDITY AND BODY MASS INDEX (BMI) OF 40.0 TO 44.9 IN ADULT, UNSPECIFIED OBESITY TYPE (HCC): ICD-10-CM

## 2024-02-21 DIAGNOSIS — E11.9 TYPE 2 DIABETES MELLITUS WITHOUT COMPLICATION, WITHOUT LONG-TERM CURRENT USE OF INSULIN (HCC): Primary | ICD-10-CM

## 2024-02-21 DIAGNOSIS — N83.8 RIGHT TUBO-OVARIAN MASS: ICD-10-CM

## 2024-02-21 LAB — HBA1C MFR BLD: 6 %

## 2024-02-21 PROCEDURE — 3044F HG A1C LEVEL LT 7.0%: CPT | Performed by: NURSE PRACTITIONER

## 2024-02-21 PROCEDURE — 83036 HEMOGLOBIN GLYCOSYLATED A1C: CPT | Performed by: NURSE PRACTITIONER

## 2024-02-21 PROCEDURE — 99214 OFFICE O/P EST MOD 30 MIN: CPT | Performed by: NURSE PRACTITIONER

## 2024-02-21 SDOH — ECONOMIC STABILITY: INCOME INSECURITY: HOW HARD IS IT FOR YOU TO PAY FOR THE VERY BASICS LIKE FOOD, HOUSING, MEDICAL CARE, AND HEATING?: NOT HARD AT ALL

## 2024-02-21 SDOH — ECONOMIC STABILITY: FOOD INSECURITY: WITHIN THE PAST 12 MONTHS, YOU WORRIED THAT YOUR FOOD WOULD RUN OUT BEFORE YOU GOT MONEY TO BUY MORE.: NEVER TRUE

## 2024-02-21 SDOH — ECONOMIC STABILITY: FOOD INSECURITY: WITHIN THE PAST 12 MONTHS, THE FOOD YOU BOUGHT JUST DIDN'T LAST AND YOU DIDN'T HAVE MONEY TO GET MORE.: NEVER TRUE

## 2024-02-21 ASSESSMENT — PATIENT HEALTH QUESTIONNAIRE - PHQ9
9. THOUGHTS THAT YOU WOULD BE BETTER OFF DEAD, OR OF HURTING YOURSELF: 0
10. IF YOU CHECKED OFF ANY PROBLEMS, HOW DIFFICULT HAVE THESE PROBLEMS MADE IT FOR YOU TO DO YOUR WORK, TAKE CARE OF THINGS AT HOME, OR GET ALONG WITH OTHER PEOPLE: 0
SUM OF ALL RESPONSES TO PHQ9 QUESTIONS 1 & 2: 0
SUM OF ALL RESPONSES TO PHQ QUESTIONS 1-9: 0
6. FEELING BAD ABOUT YOURSELF - OR THAT YOU ARE A FAILURE OR HAVE LET YOURSELF OR YOUR FAMILY DOWN: 0
DEPRESSION UNABLE TO ASSESS: FUNCTIONAL CAPACITY MOTIVATION LIMITS ACCURACY
3. TROUBLE FALLING OR STAYING ASLEEP: 0
7. TROUBLE CONCENTRATING ON THINGS, SUCH AS READING THE NEWSPAPER OR WATCHING TELEVISION: 0
1. LITTLE INTEREST OR PLEASURE IN DOING THINGS: 0
4. FEELING TIRED OR HAVING LITTLE ENERGY: 0
2. FEELING DOWN, DEPRESSED OR HOPELESS: 0
SUM OF ALL RESPONSES TO PHQ QUESTIONS 1-9: 0
SUM OF ALL RESPONSES TO PHQ QUESTIONS 1-9: 0
8. MOVING OR SPEAKING SO SLOWLY THAT OTHER PEOPLE COULD HAVE NOTICED. OR THE OPPOSITE, BEING SO FIGETY OR RESTLESS THAT YOU HAVE BEEN MOVING AROUND A LOT MORE THAN USUAL: 0
5. POOR APPETITE OR OVEREATING: 0
SUM OF ALL RESPONSES TO PHQ QUESTIONS 1-9: 0

## 2024-02-21 ASSESSMENT — ANXIETY QUESTIONNAIRES
2. NOT BEING ABLE TO STOP OR CONTROL WORRYING: 0
6. BECOMING EASILY ANNOYED OR IRRITABLE: 0
3. WORRYING TOO MUCH ABOUT DIFFERENT THINGS: 0
GAD7 TOTAL SCORE: 0
4. TROUBLE RELAXING: 0
IF YOU CHECKED OFF ANY PROBLEMS ON THIS QUESTIONNAIRE, HOW DIFFICULT HAVE THESE PROBLEMS MADE IT FOR YOU TO DO YOUR WORK, TAKE CARE OF THINGS AT HOME, OR GET ALONG WITH OTHER PEOPLE: NOT DIFFICULT AT ALL
1. FEELING NERVOUS, ANXIOUS, OR ON EDGE: 0
7. FEELING AFRAID AS IF SOMETHING AWFUL MIGHT HAPPEN: 0
5. BEING SO RESTLESS THAT IT IS HARD TO SIT STILL: 0

## 2024-02-21 ASSESSMENT — ENCOUNTER SYMPTOMS
VOMITING: 0
SHORTNESS OF BREATH: 0
COUGH: 0
NAUSEA: 0
DIARRHEA: 0
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
## 2024-02-21 NOTE — PATIENT INSTRUCTIONS
Eat and drink nothing after midnight the night before the planned procedure. Stop all aspirin, ibuprofen, aleve (tylenol/acetaminophen is ok) for seven days prior to the procedure.

## 2024-02-22 ENCOUNTER — APPOINTMENT (OUTPATIENT)
Dept: ULTRASOUND IMAGING | Age: 24
End: 2024-02-22
Payer: COMMERCIAL

## 2024-02-22 ENCOUNTER — HOSPITAL ENCOUNTER (EMERGENCY)
Age: 24
Discharge: HOME | End: 2024-02-22
Payer: COMMERCIAL

## 2024-02-22 VITALS
SYSTOLIC BLOOD PRESSURE: 121 MMHG | RESPIRATION RATE: 12 BRPM | TEMPERATURE: 98 F | OXYGEN SATURATION: 99 % | DIASTOLIC BLOOD PRESSURE: 68 MMHG | HEART RATE: 79 BPM

## 2024-02-22 DIAGNOSIS — R10.9 ABDOMINAL PAIN, UNSPECIFIED ABDOMINAL LOCATION: Primary | ICD-10-CM

## 2024-02-22 LAB
ALBUMIN SERPL-MCNC: 4.4 G/DL (ref 3.4–5)
ALBUMIN/GLOB SERPL: 1.5 {RATIO} (ref 1.1–2.2)
ALP SERPL-CCNC: 77 U/L (ref 40–129)
ALT SERPL-CCNC: 22 U/L (ref 10–40)
ANION GAP SERPL CALCULATED.3IONS-SCNC: 12 MMOL/L (ref 3–16)
AST SERPL-CCNC: 16 U/L (ref 15–37)
BASOPHILS # BLD: 0 K/UL (ref 0–0.2)
BASOPHILS NFR BLD: 0.7 %
BILIRUB SERPL-MCNC: <0.2 MG/DL (ref 0–1)
BUN SERPL-MCNC: 10 MG/DL (ref 7–20)
CALCIUM SERPL-MCNC: 9.1 MG/DL (ref 8.3–10.6)
CHLORIDE SERPL-SCNC: 101 MMOL/L (ref 99–110)
CO2 SERPL-SCNC: 23 MMOL/L (ref 21–32)
CREAT SERPL-MCNC: 0.6 MG/DL (ref 0.6–1.1)
DEPRECATED RDW RBC AUTO: 13 % (ref 12.4–15.4)
EOSINOPHIL # BLD: 0.2 K/UL (ref 0–0.6)
EOSINOPHIL NFR BLD: 3 %
GFR SERPLBLD CREATININE-BSD FMLA CKD-EPI: >60 ML/MIN/{1.73_M2}
GLUCOSE SERPL-MCNC: 114 MG/DL (ref 70–99)
HCT VFR BLD AUTO: 36.5 % (ref 36–48)
HGB BLD-MCNC: 12.3 G/DL (ref 12–16)
LIPASE SERPL-CCNC: 44 U/L (ref 13–60)
LYMPHOCYTES # BLD: 1.5 K/UL (ref 1–5.1)
LYMPHOCYTES NFR BLD: 23.2 %
MCH RBC QN AUTO: 28.7 PG (ref 26–34)
MCHC RBC AUTO-ENTMCNC: 33.7 G/DL (ref 31–36)
MCV RBC AUTO: 85.3 FL (ref 80–100)
MONOCYTES # BLD: 0.5 K/UL (ref 0–1.3)
MONOCYTES NFR BLD: 7.4 %
NEUTROPHILS # BLD: 4.2 K/UL (ref 1.7–7.7)
NEUTROPHILS NFR BLD: 65.7 %
PLATELET # BLD AUTO: 350 K/UL (ref 135–450)
PMV BLD AUTO: 6.7 FL (ref 5–10.5)
POTASSIUM SERPL-SCNC: 4.2 MMOL/L (ref 3.5–5.1)
PROT SERPL-MCNC: 7.3 G/DL (ref 6.4–8.2)
RBC # BLD AUTO: 4.28 M/UL (ref 4–5.2)
SODIUM SERPL-SCNC: 136 MMOL/L (ref 136–145)
WBC # BLD AUTO: 6.4 K/UL (ref 4–11)

## 2024-02-22 PROCEDURE — 2580000003 HC RX 258: Performed by: PHYSICIAN ASSISTANT

## 2024-02-22 PROCEDURE — 83690 ASSAY OF LIPASE: CPT

## 2024-02-22 PROCEDURE — 85025 COMPLETE CBC W/AUTO DIFF WBC: CPT

## 2024-02-22 PROCEDURE — 80053 COMPREHEN METABOLIC PANEL: CPT

## 2024-02-22 PROCEDURE — 6370000000 HC RX 637 (ALT 250 FOR IP): Performed by: PHYSICIAN ASSISTANT

## 2024-02-22 PROCEDURE — 76830 TRANSVAGINAL US NON-OB: CPT

## 2024-02-22 PROCEDURE — 99284 EMERGENCY DEPT VISIT MOD MDM: CPT

## 2024-02-22 RX ORDER — 0.9 % SODIUM CHLORIDE 0.9 %
1000 INTRAVENOUS SOLUTION INTRAVENOUS ONCE
Status: COMPLETED | OUTPATIENT
Start: 2024-02-22 | End: 2024-02-22

## 2024-02-22 RX ORDER — OXYCODONE HYDROCHLORIDE AND ACETAMINOPHEN 5; 325 MG/1; MG/1
1 TABLET ORAL ONCE
Status: COMPLETED | OUTPATIENT
Start: 2024-02-22 | End: 2024-02-22

## 2024-02-22 RX ORDER — OXYCODONE HYDROCHLORIDE AND ACETAMINOPHEN 5; 325 MG/1; MG/1
1 TABLET ORAL EVERY 6 HOURS PRN
Qty: 12 TABLET | Refills: 0 | Status: SHIPPED | OUTPATIENT
Start: 2024-02-22 | End: 2024-02-25

## 2024-02-22 RX ADMIN — OXYCODONE HYDROCHLORIDE AND ACETAMINOPHEN 1 TABLET: 5; 325 TABLET ORAL at 12:11

## 2024-02-22 RX ADMIN — SODIUM CHLORIDE 1000 ML: 9 INJECTION, SOLUTION INTRAVENOUS at 12:11

## 2024-02-22 ASSESSMENT — PAIN SCALES - GENERAL
PAINLEVEL_OUTOF10: 8
PAINLEVEL_OUTOF10: 9

## 2024-02-22 ASSESSMENT — PAIN - FUNCTIONAL ASSESSMENT: PAIN_FUNCTIONAL_ASSESSMENT: 0-10

## 2024-02-22 NOTE — ED NOTES
@1419 called obgyn per Broderick Elliott PA-C  RE: RLQ abdominal pain  @1432 Dr. Nava called back and spoke with  Broderick Elliott PA-C
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
## 2024-02-27 ENCOUNTER — ANESTHESIA EVENT (OUTPATIENT)
Dept: OPERATING ROOM | Age: 24
End: 2024-02-27
Payer: COMMERCIAL

## 2024-02-27 ENCOUNTER — HOSPITAL ENCOUNTER (OUTPATIENT)
Age: 24
Setting detail: OUTPATIENT SURGERY
Discharge: HOME | End: 2024-02-27
Attending: OBSTETRICS & GYNECOLOGY | Admitting: OBSTETRICS & GYNECOLOGY
Payer: COMMERCIAL

## 2024-02-27 ENCOUNTER — ANESTHESIA (OUTPATIENT)
Dept: OPERATING ROOM | Age: 24
End: 2024-02-27
Payer: COMMERCIAL

## 2024-02-27 VITALS
DIASTOLIC BLOOD PRESSURE: 84 MMHG | WEIGHT: 278 LBS | HEIGHT: 67 IN | OXYGEN SATURATION: 93 % | RESPIRATION RATE: 33 BRPM | SYSTOLIC BLOOD PRESSURE: 145 MMHG | HEART RATE: 77 BPM | BODY MASS INDEX: 43.63 KG/M2 | TEMPERATURE: 97.9 F

## 2024-02-27 DIAGNOSIS — N83.8 OVARIAN MASS: ICD-10-CM

## 2024-02-27 DIAGNOSIS — Z91.89 AT HIGH RISK FOR PAIN FROM PROCEDURE: Primary | ICD-10-CM

## 2024-02-27 DIAGNOSIS — R10.2 PELVIC PAIN IN FEMALE: ICD-10-CM

## 2024-02-27 LAB
ABO + RH BLD: NORMAL
BASOPHILS # BLD: 0 K/UL (ref 0–0.2)
BASOPHILS NFR BLD: 0.6 %
BLD GP AB SCN SERPL QL: NORMAL
DEPRECATED RDW RBC AUTO: 13.3 % (ref 12.4–15.4)
EOSINOPHIL # BLD: 0.2 K/UL (ref 0–0.6)
EOSINOPHIL NFR BLD: 3.1 %
GLUCOSE BLD-MCNC: 123 MG/DL (ref 70–99)
HCG UR QL: NEGATIVE
HCT VFR BLD AUTO: 36 % (ref 36–48)
HGB BLD-MCNC: 11.9 G/DL (ref 12–16)
LYMPHOCYTES # BLD: 1.7 K/UL (ref 1–5.1)
LYMPHOCYTES NFR BLD: 21.8 %
MCH RBC QN AUTO: 28.1 PG (ref 26–34)
MCHC RBC AUTO-ENTMCNC: 33.1 G/DL (ref 31–36)
MCV RBC AUTO: 84.9 FL (ref 80–100)
MONOCYTES # BLD: 0.6 K/UL (ref 0–1.3)
MONOCYTES NFR BLD: 7.3 %
NEUTROPHILS # BLD: 5.3 K/UL (ref 1.7–7.7)
NEUTROPHILS NFR BLD: 67.2 %
PERFORMED ON: ABNORMAL
PLATELET # BLD AUTO: 355 K/UL (ref 135–450)
PMV BLD AUTO: 6.8 FL (ref 5–10.5)
RBC # BLD AUTO: 4.24 M/UL (ref 4–5.2)
WBC # BLD AUTO: 7.8 K/UL (ref 4–11)

## 2024-02-27 PROCEDURE — 86850 RBC ANTIBODY SCREEN: CPT

## 2024-02-27 PROCEDURE — 7100000001 HC PACU RECOVERY - ADDTL 15 MIN: Performed by: OBSTETRICS & GYNECOLOGY

## 2024-02-27 PROCEDURE — 84703 CHORIONIC GONADOTROPIN ASSAY: CPT

## 2024-02-27 PROCEDURE — 6370000000 HC RX 637 (ALT 250 FOR IP): Performed by: NURSE ANESTHETIST, CERTIFIED REGISTERED

## 2024-02-27 PROCEDURE — 2500000003 HC RX 250 WO HCPCS: Performed by: NURSE ANESTHETIST, CERTIFIED REGISTERED

## 2024-02-27 PROCEDURE — 2709999900 HC NON-CHARGEABLE SUPPLY: Performed by: OBSTETRICS & GYNECOLOGY

## 2024-02-27 PROCEDURE — 3700000001 HC ADD 15 MINUTES (ANESTHESIA): Performed by: OBSTETRICS & GYNECOLOGY

## 2024-02-27 PROCEDURE — 2500000003 HC RX 250 WO HCPCS: Performed by: OBSTETRICS & GYNECOLOGY

## 2024-02-27 PROCEDURE — 3600000014 HC SURGERY LEVEL 4 ADDTL 15MIN: Performed by: OBSTETRICS & GYNECOLOGY

## 2024-02-27 PROCEDURE — 3600000004 HC SURGERY LEVEL 4 BASE: Performed by: OBSTETRICS & GYNECOLOGY

## 2024-02-27 PROCEDURE — 58662 LAPAROSCOPY EXCISE LESIONS: CPT | Performed by: OBSTETRICS & GYNECOLOGY

## 2024-02-27 PROCEDURE — 88112 CYTOPATH CELL ENHANCE TECH: CPT

## 2024-02-27 PROCEDURE — 7100000010 HC PHASE II RECOVERY - FIRST 15 MIN: Performed by: OBSTETRICS & GYNECOLOGY

## 2024-02-27 PROCEDURE — 7100000011 HC PHASE II RECOVERY - ADDTL 15 MIN: Performed by: OBSTETRICS & GYNECOLOGY

## 2024-02-27 PROCEDURE — 3700000000 HC ANESTHESIA ATTENDED CARE: Performed by: OBSTETRICS & GYNECOLOGY

## 2024-02-27 PROCEDURE — 85025 COMPLETE CBC W/AUTO DIFF WBC: CPT

## 2024-02-27 PROCEDURE — 6360000002 HC RX W HCPCS: Performed by: NURSE ANESTHETIST, CERTIFIED REGISTERED

## 2024-02-27 PROCEDURE — 7100000000 HC PACU RECOVERY - FIRST 15 MIN: Performed by: OBSTETRICS & GYNECOLOGY

## 2024-02-27 PROCEDURE — 86901 BLOOD TYPING SEROLOGIC RH(D): CPT

## 2024-02-27 PROCEDURE — 2580000003 HC RX 258: Performed by: ANESTHESIOLOGY

## 2024-02-27 PROCEDURE — 2720000010 HC SURG SUPPLY STERILE: Performed by: OBSTETRICS & GYNECOLOGY

## 2024-02-27 PROCEDURE — 88305 TISSUE EXAM BY PATHOLOGIST: CPT

## 2024-02-27 PROCEDURE — 86900 BLOOD TYPING SEROLOGIC ABO: CPT

## 2024-02-27 RX ORDER — DEXAMETHASONE SODIUM PHOSPHATE 4 MG/ML
INJECTION, SOLUTION INTRA-ARTICULAR; INTRALESIONAL; INTRAMUSCULAR; INTRAVENOUS; SOFT TISSUE PRN
Status: DISCONTINUED | OUTPATIENT
Start: 2024-02-27 | End: 2024-02-27 | Stop reason: SDUPTHER

## 2024-02-27 RX ORDER — SODIUM CHLORIDE 9 MG/ML
INJECTION, SOLUTION INTRAVENOUS PRN
Status: DISCONTINUED | OUTPATIENT
Start: 2024-02-27 | End: 2024-02-27 | Stop reason: HOSPADM

## 2024-02-27 RX ORDER — SODIUM CHLORIDE 0.9 % (FLUSH) 0.9 %
5-40 SYRINGE (ML) INJECTION EVERY 12 HOURS SCHEDULED
Status: DISCONTINUED | OUTPATIENT
Start: 2024-02-27 | End: 2024-02-27 | Stop reason: HOSPADM

## 2024-02-27 RX ORDER — SODIUM CHLORIDE, SODIUM LACTATE, POTASSIUM CHLORIDE, CALCIUM CHLORIDE 600; 310; 30; 20 MG/100ML; MG/100ML; MG/100ML; MG/100ML
INJECTION, SOLUTION INTRAVENOUS CONTINUOUS
Status: DISCONTINUED | OUTPATIENT
Start: 2024-02-27 | End: 2024-02-27 | Stop reason: HOSPADM

## 2024-02-27 RX ORDER — LIDOCAINE HYDROCHLORIDE 20 MG/ML
INJECTION, SOLUTION EPIDURAL; INFILTRATION; INTRACAUDAL; PERINEURAL PRN
Status: DISCONTINUED | OUTPATIENT
Start: 2024-02-27 | End: 2024-02-27 | Stop reason: SDUPTHER

## 2024-02-27 RX ORDER — ONDANSETRON 2 MG/ML
INJECTION INTRAMUSCULAR; INTRAVENOUS PRN
Status: DISCONTINUED | OUTPATIENT
Start: 2024-02-27 | End: 2024-02-27 | Stop reason: SDUPTHER

## 2024-02-27 RX ORDER — OXYCODONE HYDROCHLORIDE 5 MG/1
10 TABLET ORAL PRN
Status: DISCONTINUED | OUTPATIENT
Start: 2024-02-27 | End: 2024-02-27 | Stop reason: HOSPADM

## 2024-02-27 RX ORDER — SODIUM CHLORIDE 0.9 % (FLUSH) 0.9 %
5-40 SYRINGE (ML) INJECTION PRN
Status: DISCONTINUED | OUTPATIENT
Start: 2024-02-27 | End: 2024-02-27 | Stop reason: HOSPADM

## 2024-02-27 RX ORDER — ONDANSETRON 2 MG/ML
4 INJECTION INTRAMUSCULAR; INTRAVENOUS
Status: DISCONTINUED | OUTPATIENT
Start: 2024-02-27 | End: 2024-02-27 | Stop reason: HOSPADM

## 2024-02-27 RX ORDER — MIDAZOLAM HYDROCHLORIDE 1 MG/ML
INJECTION INTRAMUSCULAR; INTRAVENOUS PRN
Status: DISCONTINUED | OUTPATIENT
Start: 2024-02-27 | End: 2024-02-27 | Stop reason: SDUPTHER

## 2024-02-27 RX ORDER — SODIUM CHLORIDE, SODIUM LACTATE, POTASSIUM CHLORIDE, AND CALCIUM CHLORIDE .6; .31; .03; .02 G/100ML; G/100ML; G/100ML; G/100ML
IRRIGANT IRRIGATION PRN
Status: DISCONTINUED | OUTPATIENT
Start: 2024-02-27 | End: 2024-02-27 | Stop reason: ALTCHOICE

## 2024-02-27 RX ORDER — DIPHENHYDRAMINE HYDROCHLORIDE 50 MG/ML
12.5 INJECTION INTRAMUSCULAR; INTRAVENOUS
Status: DISCONTINUED | OUTPATIENT
Start: 2024-02-27 | End: 2024-02-27 | Stop reason: HOSPADM

## 2024-02-27 RX ORDER — LABETALOL HYDROCHLORIDE 5 MG/ML
5 INJECTION, SOLUTION INTRAVENOUS EVERY 10 MIN PRN
Status: DISCONTINUED | OUTPATIENT
Start: 2024-02-27 | End: 2024-02-27 | Stop reason: HOSPADM

## 2024-02-27 RX ORDER — ALBUTEROL SULFATE 90 UG/1
AEROSOL, METERED RESPIRATORY (INHALATION) PRN
Status: DISCONTINUED | OUTPATIENT
Start: 2024-02-27 | End: 2024-02-27 | Stop reason: SDUPTHER

## 2024-02-27 RX ORDER — PROPOFOL 10 MG/ML
INJECTION, EMULSION INTRAVENOUS PRN
Status: DISCONTINUED | OUTPATIENT
Start: 2024-02-27 | End: 2024-02-27 | Stop reason: SDUPTHER

## 2024-02-27 RX ORDER — LIDOCAINE HYDROCHLORIDE 10 MG/ML
1 INJECTION, SOLUTION EPIDURAL; INFILTRATION; INTRACAUDAL; PERINEURAL
Status: DISCONTINUED | OUTPATIENT
Start: 2024-02-27 | End: 2024-02-27 | Stop reason: HOSPADM

## 2024-02-27 RX ORDER — OXYCODONE HYDROCHLORIDE 5 MG/1
5 TABLET ORAL PRN
Status: DISCONTINUED | OUTPATIENT
Start: 2024-02-27 | End: 2024-02-27 | Stop reason: HOSPADM

## 2024-02-27 RX ORDER — FENTANYL CITRATE 50 UG/ML
INJECTION, SOLUTION INTRAMUSCULAR; INTRAVENOUS PRN
Status: DISCONTINUED | OUTPATIENT
Start: 2024-02-27 | End: 2024-02-27 | Stop reason: SDUPTHER

## 2024-02-27 RX ORDER — BUPIVACAINE HYDROCHLORIDE AND EPINEPHRINE 5; 5 MG/ML; UG/ML
INJECTION, SOLUTION PERINEURAL PRN
Status: DISCONTINUED | OUTPATIENT
Start: 2024-02-27 | End: 2024-02-27 | Stop reason: ALTCHOICE

## 2024-02-27 RX ORDER — IBUPROFEN 800 MG/1
800 TABLET ORAL EVERY 8 HOURS PRN
Qty: 30 TABLET | Refills: 0 | Status: SHIPPED | OUTPATIENT
Start: 2024-02-27

## 2024-02-27 RX ORDER — OXYCODONE HYDROCHLORIDE AND ACETAMINOPHEN 5; 325 MG/1; MG/1
1 TABLET ORAL EVERY 6 HOURS PRN
Qty: 28 TABLET | Refills: 0 | Status: SHIPPED | OUTPATIENT
Start: 2024-02-27 | End: 2024-03-05

## 2024-02-27 RX ORDER — ROCURONIUM BROMIDE 10 MG/ML
INJECTION, SOLUTION INTRAVENOUS PRN
Status: DISCONTINUED | OUTPATIENT
Start: 2024-02-27 | End: 2024-02-27 | Stop reason: SDUPTHER

## 2024-02-27 RX ORDER — KETOROLAC TROMETHAMINE 30 MG/ML
INJECTION, SOLUTION INTRAMUSCULAR; INTRAVENOUS PRN
Status: DISCONTINUED | OUTPATIENT
Start: 2024-02-27 | End: 2024-02-27 | Stop reason: SDUPTHER

## 2024-02-27 RX ORDER — MEPERIDINE HYDROCHLORIDE 50 MG/ML
12.5 INJECTION INTRAMUSCULAR; INTRAVENOUS; SUBCUTANEOUS EVERY 5 MIN PRN
Status: DISCONTINUED | OUTPATIENT
Start: 2024-02-27 | End: 2024-02-27 | Stop reason: HOSPADM

## 2024-02-27 RX ORDER — HYDROMORPHONE HYDROCHLORIDE 2 MG/ML
INJECTION, SOLUTION INTRAMUSCULAR; INTRAVENOUS; SUBCUTANEOUS PRN
Status: DISCONTINUED | OUTPATIENT
Start: 2024-02-27 | End: 2024-02-27 | Stop reason: SDUPTHER

## 2024-02-27 RX ADMIN — DEXAMETHASONE SODIUM PHOSPHATE 8 MG: 4 INJECTION, SOLUTION INTRAMUSCULAR; INTRAVENOUS at 09:23

## 2024-02-27 RX ADMIN — FENTANYL CITRATE 50 MCG: 50 INJECTION, SOLUTION INTRAMUSCULAR; INTRAVENOUS at 09:02

## 2024-02-27 RX ADMIN — ONDANSETRON 4 MG: 2 INJECTION INTRAMUSCULAR; INTRAVENOUS at 09:23

## 2024-02-27 RX ADMIN — Medication 4 PUFF: at 10:35

## 2024-02-27 RX ADMIN — SODIUM CHLORIDE, SODIUM LACTATE, POTASSIUM CHLORIDE, AND CALCIUM CHLORIDE: .6; .31; .03; .02 INJECTION, SOLUTION INTRAVENOUS at 08:56

## 2024-02-27 RX ADMIN — ROCURONIUM BROMIDE 50 MG: 50 INJECTION, SOLUTION INTRAVENOUS at 09:02

## 2024-02-27 RX ADMIN — PROPOFOL 150 MG: 10 INJECTION, EMULSION INTRAVENOUS at 09:02

## 2024-02-27 RX ADMIN — HYDROMORPHONE HYDROCHLORIDE 0.5 MG: 2 INJECTION, SOLUTION INTRAMUSCULAR; INTRAVENOUS; SUBCUTANEOUS at 10:15

## 2024-02-27 RX ADMIN — FENTANYL CITRATE 50 MCG: 50 INJECTION, SOLUTION INTRAMUSCULAR; INTRAVENOUS at 09:25

## 2024-02-27 RX ADMIN — MIDAZOLAM 2 MG: 1 INJECTION INTRAMUSCULAR; INTRAVENOUS at 08:56

## 2024-02-27 RX ADMIN — HYDROMORPHONE HYDROCHLORIDE 0.5 MG: 2 INJECTION, SOLUTION INTRAMUSCULAR; INTRAVENOUS; SUBCUTANEOUS at 10:00

## 2024-02-27 RX ADMIN — KETOROLAC TROMETHAMINE 30 MG: 30 INJECTION, SOLUTION INTRAMUSCULAR; INTRAVENOUS at 10:12

## 2024-02-27 RX ADMIN — LIDOCAINE HYDROCHLORIDE 60 MG: 20 INJECTION, SOLUTION EPIDURAL; INFILTRATION; INTRACAUDAL; PERINEURAL at 09:02

## 2024-02-27 RX ADMIN — SODIUM CHLORIDE, SODIUM LACTATE, POTASSIUM CHLORIDE, AND CALCIUM CHLORIDE: .6; .31; .03; .02 INJECTION, SOLUTION INTRAVENOUS at 10:26

## 2024-02-27 RX ADMIN — SUGAMMADEX 200 MG: 100 INJECTION, SOLUTION INTRAVENOUS at 10:18

## 2024-02-27 ASSESSMENT — PAIN - FUNCTIONAL ASSESSMENT: PAIN_FUNCTIONAL_ASSESSMENT: 0-10

## 2024-02-27 NOTE — DISCHARGE INSTRUCTIONS
Laparoscopic Oophorectomy: What to Expect at Home  Your Recovery     Laparoscopic oophorectomy is surgery to remove one, both, or part of your ovaries. Your doctor put a lighted tube (scope) and other tools through small cuts in your belly to do this.  After surgery, you may feel some pain in your belly for a few days. Your belly may also be swollen. You may have a change in your bowel movements for a few days.  You may also have shoulder pain for a day or two. This is caused by the air your doctor put in your belly to help see your organs better.  To help with pain, your doctor may prescribe medicines. You may need about 1 week to fully recover. Avoid strenuous activity and lifting anything heavy while you recover. You can ask your doctor when it's okay to have sex.  If you had both ovaries removed, you will start menopause if you haven't already. Your doctor may prescribe you hormone therapy.  This care sheet gives you a general idea about how long it will take for you to recover. But each person recovers at a different pace. Follow the steps below to get better as quickly as possible.  How can you care for yourself at home?  Activity    Rest when you feel tired.     Be active. Walking is a good choice.     Allow your body to heal. Don't move quickly or lift anything heavy until you are feeling better.     Hold a pillow over your incisions when you cough or take deep breaths. This will support your belly and may help to decrease your pain.     Do breathing exercises at home as instructed by your doctor. This will help prevent pneumonia.     Ask your doctor when it is okay for you to have sex.   Diet    You can eat your normal diet. If your stomach is upset, try bland, low-fat foods like plain rice, broiled chicken, toast, and yogurt.     Drink plenty of fluids (unless your doctor tells you not to).     If your bowel movements are not regular right after surgery, try to avoid constipation and straining. Drink

## 2024-02-27 NOTE — DISCHARGE INSTR - ACTIVITY
Call for increased pain, significant vaginal bleeding (soaking through 2 pads in < 1hr) or temperature greater than 101 degrees F.    Nothing in vagina for 2 weeks (pelvic rest), including intercourse, tampons, toys, fingers.   Advance activity as tolerated but limit lifting <10 lbs for 1 week.   Take PRN medications as prescribed.   FU in office in 2 weeks.     Normal Diet     Please call with questions or concerns.   Camilla Blakely, DO

## 2024-02-27 NOTE — OP NOTE
Operative Note      Patient: Nirmala Piedra  YOB: 2000  MRN: [id number]    Date of Procedure: 2/27/2024    Pre-Op Diagnosis Codes:     * Ovarian mass [N83.8]     * Pelvic pain in female [R10.2]    Post-Op Diagnosis: Same and cystic right ovary, stage II endometriosis        Procedure(s):  LAPAROSCOPIC RIGHT SALPINGOOPHORECTOMY, ABLATION OF ENDOMETRIOSIS, PELVIC WASHINGS  POSSIBLE OOPHORECTOMY, OTHER INDICATED PROCEDURES    Surgeon(s):  Camilla Blakely,     Assistant:   Surgical Assistant: Fito Garcia SA student     Anesthesia: General    Estimated Blood Loss (mL): less than 50     Complications: None    Specimens:   ID Type Source Tests Collected by Time Destination   A : RIGHT TUBE AND RIGHT OVARY Specimen Abdomen SURGICAL PATHOLOGY Camilla Blakely, DO 2/27/2024 0948    B : PELVIC WASHINGS Specimen Abdomen CYTOLOGY, NON-GYN Camilla Blakely, DO 2/27/2024 0954        Implants:  * No implants in log *      Drains: * No LDAs found *    Findings:   Norm ext genitalia, urethra, vagina and cervix   Right ovary that is mildly enlarged with multiple superficial cystic components. Concerned for endometriosis vs malignancy. Elected to remove ovary due to abnormal appearance.   Normal appearing right and left tube   Left ovary with similar cystic appearance but not as significant, elected to retain left ovary in case of benign pathology.   Endometrial deposits to posterior cul-de-sac and bilateral ovarian fossa.   Otherwise normal appearing pelvis  Pelvic washings collected   Normal appearing bowel and liver edge     Detailed Description of Procedure:   Patient was taken to the operating room where general anesthesia was administered and found to be adequate.  She was placed on the operating table in the dorsal lithotomy position with yellowfin stirrups.  She was prepped and draped in the normal sterile fashion.  Universal timeout was conducted and all parties agreed to accurate

## 2024-02-27 NOTE — H&P
Patient seen and evaluated prior to surgery.      Patient reports no changes in medical condition.  There have been no changes in the patient's medications or assessment.  Preoperative tests and diagnostics have been reviewed.     Surgery remains indicated as noted in History and Physical (H&P).    Prophylactic antibiotics, use of beta-blockers and VTE prophylaxis have been addressed/ordered as indicated.      Please see H&P and orders.      Camilla Blakely DO

## 2024-02-27 NOTE — PROGRESS NOTES
Surgery Date and Time: 2/27/24 @ 9:00 am      Arrival Time:   7:00 am    The instructions given when and if a patient needs to stop oral intake prior to surgery varies. Follow the instructions you were given by your    Surgeon or RN during the Pre-op call.       __X__Nothing to eat or to drink after Midnight the night before the surgery. NO gum, mints, candy or ice chips day of surgery.                 Only take the following medications with a small sip of water the morning of surgery:    NONE  (no Metformin morning of surgery)      Aspirin, Ibuprofen, Advil, Naproxen, Vitamin E and other Anti-inflammatory products and supplements should be stopped for 5 -7days before surgery      or as directed by your physician.   Hold Ibuprofen and Multivitamin - last dose 2/21/24     - Do not smoke or vape, and do not drink any alcoholic beverages 24 hours prior to surgery, this includes NA Beer. Refrain from using any recreational drugs,     including non-prescribed prescription drugs.     -You may brush your teeth and gargle the morning of surgery.  DO NOT SWALLOW WATER.    -You MUST plan for a responsible adult to stay on site while you are here and take you home after your surgery. You will not be allowed to leave alone or drive               yourself home. It is requested someone stay with you the first 24 hrs. Your surgery will be cancelled if you do not have a ride home with a responsible adult.    -Please wear simple, loose-fitting clothing to the hospital. Do not bring valuables (money, credit cards, checkbooks, etc.) Do not wear any makeup (including                no eye makeup) and no nail polish if applicable.             - DO NOT wear any jewelry or body piercings day of surgery.  All body piercing jewelry must be removed.             - If you have dentures they will be removed before going to the OR; we will provide a container.  If you wear contact lenses or glasses, they will be removed,               bring a 
Pt brought to PACU. Report obtained from OR RN and anesthesia. Pt placed on monitor and O2 at  5L.  Oral airway in place, writer at bedside for monitoring   
Pt up to the bathroom to void without difficulty. Discharge instructions given to pt and family. Verbalized understanding. PIV removed. Pt dressed and wheeled out and discharged to the care of their family in stable condition.    
DOS __________  (__) C-REACTIVE PROTEIN ___________    (__) VITAMIN D HYDROXY ___________  (__) OAC    ________________________    (__) ACE/ ARBS: _____________________     (__) BETABLOCKERS __________________    Ride home/Contact #__________________________

## 2024-02-27 NOTE — ANESTHESIA POSTPROCEDURE EVALUATION
SpO2:93 %  02/27/24 1113, Pulse:89, Resp:23, SpO2:93 %  02/27/24 1112, Pulse:81, Resp:22, SpO2:92 %  02/27/24 1111, Pulse:78, Resp:24, SpO2:92 %  02/27/24 1110, Pulse:84, Resp:27, SpO2:92 %  02/27/24 1109, Pulse:80, Resp:28, SpO2:92 %  02/27/24 1108, Pulse:82, Resp:24, SpO2:93 %  02/27/24 1107, Pulse:83, Resp:30, SpO2:92 %  02/27/24 1106, Pulse:84, Resp:(!) 31, SpO2:92 %  02/27/24 1105, Pulse:83, Resp:22, SpO2:92 %  02/27/24 1104, Pulse:78, Resp:30, SpO2:92 %  02/27/24 1103, Pulse:86, Resp:18, SpO2:91 %  02/27/24 1102, Pulse:88, Resp:(!) 41, SpO2:90 %  02/27/24 1101, Pulse:82, Resp:28, SpO2:(!) 89 %  02/27/24 1100, BP:(!) 155/90, Temp:97.9 °F (36.6 °C), Temp src:Temporal, Pulse:93, Resp:22, SpO2:92 %  02/27/24 1059, Pulse:94, Resp:(!) 44, SpO2:(!) 89 %  02/27/24 1058, Pulse:(!) 102, Resp:21, SpO2:(!) 88 %  02/27/24 1057, Pulse:88, Resp:20, SpO2:(!) 89 %  02/27/24 1056, Pulse:86, Resp:19, SpO2:(!) 89 %  02/27/24 1055, Pulse:86, Resp:24, SpO2:91 %  02/27/24 1054, Pulse:82, Resp:25, SpO2:92 %  02/27/24 1053, Pulse:89, Resp:26, SpO2:91 %  02/27/24 1052, Pulse:82, Resp:28, SpO2:94 %  02/27/24 1051, Pulse:80, Resp:24, SpO2:94 %  02/27/24 1050, Pulse:91, Resp:24, SpO2:93 %  02/27/24 1049, Pulse:91, Resp:(!) 43, SpO2:92 %  02/27/24 1048, Pulse:(!) 102, Resp:20, SpO2:94 %  02/27/24 1047, Pulse:(!) 104, Resp:25, SpO2:94 %  02/27/24 1046, Pulse:(!) 102, Resp:20, SpO2:95 %  02/27/24 1045, BP:(!) 138/93, Temp:98.2 °F (36.8 °C), Temp src:Temporal, Pulse:87, Resp:18, SpO2:91 %  02/27/24 1044, BP:(!) 133/90, Pulse:91, Resp:22, SpO2:100 %  02/27/24 0711, BP:135/82, Temp:97.8 °F (36.6 °C), Temp src:Oral, Pulse:84, Resp:16, SpO2:98 %     LABS:    CBC  Lab Results       Component                Value               Date/Time                  WBC                      7.8                 02/27/2024 07:43 AM        HGB                      11.9 (L)            02/27/2024 07:43 AM        HCT                      36.0

## 2024-02-28 ENCOUNTER — TELEPHONE (OUTPATIENT)
Dept: OBGYN CLINIC | Age: 24
End: 2024-02-28

## 2024-02-28 NOTE — TELEPHONE ENCOUNTER
Patient calling to ask if it is okay to take bandages off from surgery. Informed patient that it is okay, she can shower as usual. There may be steri strips on the incisions that will fall off eventually. Patient will check incision site and watch for signs of infection, she will call if that happens. She asked if it was normal to have difficulty urinating after surgery. Informed patient that can be normal as well, sometimes you have to push to start the stream. Patient confirmed that she is able to urinate.     Routing to Dr. Blakely as JHONY.

## 2024-03-07 ENCOUNTER — OFFICE VISIT (OUTPATIENT)
Dept: OBGYN CLINIC | Age: 24
End: 2024-03-07

## 2024-03-07 VITALS
DIASTOLIC BLOOD PRESSURE: 73 MMHG | HEIGHT: 67 IN | BODY MASS INDEX: 42.85 KG/M2 | HEART RATE: 98 BPM | WEIGHT: 273 LBS | SYSTOLIC BLOOD PRESSURE: 123 MMHG | TEMPERATURE: 97 F

## 2024-03-07 DIAGNOSIS — Z48.89 POSTOPERATIVE VISIT: Primary | ICD-10-CM

## 2024-03-07 DIAGNOSIS — N80.9 ENDOMETRIOSIS DETERMINED BY LAPAROSCOPY: ICD-10-CM

## 2024-03-07 DIAGNOSIS — Z78.9 FEMALE-TO-MALE TRANSGENDER PERSON: ICD-10-CM

## 2024-03-07 DIAGNOSIS — R10.2 PELVIC PAIN: ICD-10-CM

## 2024-03-07 DIAGNOSIS — Z98.890 S/P LAPAROSCOPY: ICD-10-CM

## 2024-03-07 DIAGNOSIS — Z90.721 S/P RIGHT OOPHORECTOMY: ICD-10-CM

## 2024-03-07 DIAGNOSIS — N83.8 OVARIAN MASS, RIGHT: ICD-10-CM

## 2024-03-07 PROCEDURE — 99024 POSTOP FOLLOW-UP VISIT: CPT | Performed by: OBSTETRICS & GYNECOLOGY

## 2024-03-07 RX ORDER — NORGESTIMATE AND ETHINYL ESTRADIOL 0.25-0.035
1 KIT ORAL DAILY
Qty: 3 PACKET | Refills: 3 | Status: SHIPPED | OUTPATIENT
Start: 2024-03-07

## 2024-03-08 ENCOUNTER — TELEPHONE (OUTPATIENT)
Dept: OBGYN CLINIC | Age: 24
End: 2024-03-08

## 2024-03-08 NOTE — TELEPHONE ENCOUNTER
I am out of office this afternoon   Offer apt next week   Luckily if he is on antibiotics this should help until he can be seen again   ALH

## 2024-03-08 NOTE — TELEPHONE ENCOUNTER
Vinay states that she had a post op appointment and one of her incisions were missed on assessment, later in the evening pt experienced pain went to ED and was told that the incision had an infection was put on antibiotics and was told to FU with OBGYN. Please advise    Routing to Dr. Blakely

## 2024-03-08 NOTE — TELEPHONE ENCOUNTER
Patient calling in reference to earlier encounter, available this afternoon. Please advise     Routing to Dr. Blakely  Routing to ONCALL

## 2024-03-09 ENCOUNTER — TELEPHONE (OUTPATIENT)
Dept: OBGYN | Age: 24
End: 2024-03-09

## 2024-03-09 NOTE — TELEPHONE ENCOUNTER
22 y/o G0  S/P LAPAROSCOPIC RIGHT SALPINGOOPHORECTOMY, ABLATION OF ENDOMETRIOSIS, PELVIC WASHINGS on 2/27/24.   Patient calling with concerns regarding incision.  (Type 2 diabetic)  States the suprapubic incision (12 mm per op note) is hard and red.  Has noted pain in the area.  States redness is spreading.   Patient states she is on Bactrim for incision--seen in ER.   Unable to see notes in Epic regarding incision.  Multiple ER visits at several different facilities are noted.  Therefore, the notation may not be in the record yet or may be from an outside facility.    Office notes indicate patient has upcoming visit with Dr. Blakely.  Advised patient that visit could be moved sooner but if pain, redness, etc are worsening she will need to be evaluated in the ER as it is the weekend.    Sending to Dr. Blakely and staff as JHONY

## 2024-03-12 ENCOUNTER — OFFICE VISIT (OUTPATIENT)
Dept: OBGYN CLINIC | Age: 24
End: 2024-03-12

## 2024-03-12 VITALS
DIASTOLIC BLOOD PRESSURE: 68 MMHG | OXYGEN SATURATION: 99 % | HEIGHT: 67 IN | TEMPERATURE: 99.1 F | HEART RATE: 98 BPM | SYSTOLIC BLOOD PRESSURE: 140 MMHG | BODY MASS INDEX: 43 KG/M2 | WEIGHT: 274 LBS

## 2024-03-12 DIAGNOSIS — Z90.721 S/P RIGHT OOPHORECTOMY: ICD-10-CM

## 2024-03-12 DIAGNOSIS — T81.41XD INFECTION OF SUPERFICIAL INCISIONAL SURGICAL SITE AFTER PROCEDURE, SUBSEQUENT ENCOUNTER: Primary | ICD-10-CM

## 2024-03-12 DIAGNOSIS — Z98.890 S/P LAPAROSCOPIC PROCEDURE: ICD-10-CM

## 2024-03-12 DIAGNOSIS — Z98.890 POSTOPERATIVE NAUSEA: ICD-10-CM

## 2024-03-12 DIAGNOSIS — R11.0 POSTOPERATIVE NAUSEA: ICD-10-CM

## 2024-03-12 PROCEDURE — 99024 POSTOP FOLLOW-UP VISIT: CPT | Performed by: OBSTETRICS & GYNECOLOGY

## 2024-03-12 RX ORDER — SULFAMETHOXAZOLE AND TRIMETHOPRIM 800; 160 MG/1; MG/1
1 TABLET ORAL 2 TIMES DAILY
COMMUNITY
Start: 2024-03-07 | End: 2024-03-17

## 2024-03-12 NOTE — PROGRESS NOTES
Outpatient Postoperative Visit     CC:   Chief Complaint   Patient presents with    Post-Op Check     Pt went to ER and is on Bactrim and not sure how much longer pt has on it. Tries to remember medication..        Subjective:  23 y.o.  here for evaluation s/p LAPAROSCOPIC RIGHT SALPINGOOPHORECTOMY, ABLATION OF ENDOMETRIOSIS, PELVIC WASHINGS on 24   Pt seen and examined. Doing well.  Was seen in ED after last visit due to concern for infection of pubic incision.   Admits to subjective fevers, nothing > 99F, denies chills but does admit to some nausea / emesis since procedure.  Has been on Bactrim DS since the , but states he has not been taking them as prescribed -- I reviewed the importance of abx diligence.   Pain otherwise well controlled. +Flatus. + voids spontaneously.      Review of Systems - The following ROS was otherwise negative, except as noted in the HPI: constitutional, respiratory, cardiovascular, gastrointestinal, genitourinary    Objective:  BP (!) 140/68 (Site: Right Upper Arm, Position: Sitting, Cuff Size: Medium Adult)   Pulse 98   Temp 99.1 °F (37.3 °C) (Temporal)   Ht 1.702 m (5' 7\")   Wt 124.3 kg (274 lb)   LMP 2024 (Approximate)   SpO2 99%   BMI 42.91 kg/m²   General: Alert, well appearing, no acute distress  Abdomen: Soft, appropriately tender to palpation, non-distended  Incision: Appears c/d/i, no significant drainage or erythema  Physical Exam  Abdominal:            Extremities: No redness or tenderness in LE, neg Pamella's sign     Path:   FINAL DIAGNOSIS:     Right ovary and fallopian tube, salpingo-oophorectomy:   -Benign ovary with multiple follicular cysts.   -Fallopian tube with extensive stromal congestion and hemorrhage.   -No evidence of atypia or malignancy.      MUTGE/MUTGE     Assessment/Plan:   Diagnosis Orders   1. Infection of superficial incisional surgical site after procedure, subsequent encounter        2. S/P laparoscopic procedure        3.

## 2024-03-22 DIAGNOSIS — E11.9 TYPE 2 DIABETES MELLITUS WITHOUT COMPLICATION, WITHOUT LONG-TERM CURRENT USE OF INSULIN (HCC): ICD-10-CM

## 2024-03-22 RX ORDER — METFORMIN HYDROCHLORIDE 500 MG/1
500 TABLET, EXTENDED RELEASE ORAL DAILY
Qty: 90 TABLET | Refills: 0 | Status: SHIPPED | OUTPATIENT
Start: 2024-03-22

## 2024-04-02 ENCOUNTER — OFFICE VISIT (OUTPATIENT)
Dept: OBGYN CLINIC | Age: 24
End: 2024-04-02
Payer: COMMERCIAL

## 2024-04-02 VITALS
TEMPERATURE: 98.5 F | WEIGHT: 288 LBS | HEART RATE: 89 BPM | SYSTOLIC BLOOD PRESSURE: 118 MMHG | DIASTOLIC BLOOD PRESSURE: 76 MMHG | BODY MASS INDEX: 45.11 KG/M2

## 2024-04-02 DIAGNOSIS — Z48.89 POSTOPERATIVE VISIT: Primary | ICD-10-CM

## 2024-04-02 DIAGNOSIS — T81.41XD INFECTION OF SUPERFICIAL INCISIONAL SURGICAL SITE AFTER PROCEDURE, SUBSEQUENT ENCOUNTER: ICD-10-CM

## 2024-04-02 DIAGNOSIS — Z90.721 S/P RIGHT OOPHORECTOMY: ICD-10-CM

## 2024-04-02 PROCEDURE — 99212 OFFICE O/P EST SF 10 MIN: CPT | Performed by: OBSTETRICS & GYNECOLOGY

## 2024-04-02 NOTE — PROGRESS NOTES
Outpatient Postoperative Visit     CC:   Chief Complaint   Patient presents with    Post-Op Check     On birth control, skipped placebo and still having periods.  Having pain on the right side where the ovary was removed, possible \"phantom pain\"?       Subjective:  23 y.o.  here for evaluation s/p LAPAROSCOPIC RIGHT SALPINGOOPHORECTOMY, ABLATION OF ENDOMETRIOSIS, PELVIC WASHINGS on 24   Pt seen and examined. Doing well.  Was seen in ED after last visit due to concern for infection of pubic incision.   Denies fevers / chills / nausea / emesis since procedure  Completed Abx course of Bactrim from urgent care    Pain otherwise well controlled. +Flatus. + voids spontaneously.      Review of Systems - The following ROS was otherwise negative, except as noted in the HPI: constitutional, respiratory, cardiovascular, gastrointestinal, genitourinary    Objective:  /76 (Site: Right Upper Arm, Position: Sitting, Cuff Size: Large Adult)   Pulse 89   Temp 98.5 °F (36.9 °C) (Infrared)   Wt 130.6 kg (288 lb)   BMI 45.11 kg/m²   General: Alert, well appearing, no acute distress  Abdomen: Soft, appropriately tender to palpation, non-distended  Incision: Appears c/d/i, no significant drainage or erythema  Physical Exam  Abdominal:            Extremities: No redness or tenderness in LE, neg Pamella's sign     Path:   FINAL DIAGNOSIS:     Right ovary and fallopian tube, salpingo-oophorectomy:   -Benign ovary with multiple follicular cysts.   -Fallopian tube with extensive stromal congestion and hemorrhage.   -No evidence of atypia or malignancy.      MUTGE/MUTGE     Assessment/Plan:   Diagnosis Orders   1. Postoperative visit        2. S/P right oophorectomy        3. Infection of superficial incisional surgical site after procedure, subsequent encounter (RESOLVED)          - doing well today, meeting Post op milestones   - return precautions reviewed in detail   - recommend FU for annual future     Follow

## 2024-04-09 ENCOUNTER — TELEPHONE (OUTPATIENT)
Dept: OBGYN CLINIC | Age: 24
End: 2024-04-09

## 2024-04-09 NOTE — TELEPHONE ENCOUNTER
Received call from [facility] requesting call back from Dr Blakely.

## 2024-04-09 NOTE — TELEPHONE ENCOUNTER
Called back and recommend OCP taper and office FU in 1-2 weeks   HGB and vitals stable   Declined a pelvic exam in ED   Return precautions reviewed     FYI

## 2024-04-09 NOTE — TELEPHONE ENCOUNTER
Pt states she started a new OCP about a month ago.she has been taking the placebo pills. She started her period 9 days ago and is still bleeding. The bleeding is now heavy. She reports soaking through a super pad. She has changed 4 pads in the last 3 hours. She reports feeling light headed and slightly nauseous . Pt was given bleeding precautions. Pt is aware if bleeding heavy to the point of soaking a pad or tampon every hour or having large clots that she will need to report to the Emergency Department. Please advise

## 2024-04-09 NOTE — TELEPHONE ENCOUNTER
If she is bleeding through 1 pad an hour recommend being seen in ED for blood work / possible US  Otherwise heavy bleeding can be normal when we are starting on a new medication     ALH

## 2024-04-19 ENCOUNTER — OFFICE VISIT (OUTPATIENT)
Dept: FAMILY MEDICINE CLINIC | Age: 24
End: 2024-04-19
Payer: COMMERCIAL

## 2024-04-19 VITALS
WEIGHT: 292 LBS | HEART RATE: 89 BPM | DIASTOLIC BLOOD PRESSURE: 84 MMHG | SYSTOLIC BLOOD PRESSURE: 125 MMHG | HEIGHT: 67 IN | BODY MASS INDEX: 45.83 KG/M2 | OXYGEN SATURATION: 98 %

## 2024-04-19 DIAGNOSIS — R55 SYNCOPE, UNSPECIFIED SYNCOPE TYPE: ICD-10-CM

## 2024-04-19 DIAGNOSIS — L01.00 IMPETIGO: Primary | ICD-10-CM

## 2024-04-19 LAB
BASOPHILS # BLD: 0 K/UL (ref 0–0.2)
BASOPHILS NFR BLD: 0.6 %
DEPRECATED RDW RBC AUTO: 13.2 % (ref 12.4–15.4)
EOSINOPHIL # BLD: 0.1 K/UL (ref 0–0.6)
EOSINOPHIL NFR BLD: 2 %
HCT VFR BLD AUTO: 35.9 % (ref 36–48)
HGB BLD-MCNC: 11.9 G/DL (ref 12–16)
LYMPHOCYTES # BLD: 1.4 K/UL (ref 1–5.1)
LYMPHOCYTES NFR BLD: 22.8 %
MCH RBC QN AUTO: 27.6 PG (ref 26–34)
MCHC RBC AUTO-ENTMCNC: 33 G/DL (ref 31–36)
MCV RBC AUTO: 83.6 FL (ref 80–100)
MONOCYTES # BLD: 0.4 K/UL (ref 0–1.3)
MONOCYTES NFR BLD: 6.8 %
NEUTROPHILS # BLD: 4.2 K/UL (ref 1.7–7.7)
NEUTROPHILS NFR BLD: 67.8 %
PLATELET # BLD AUTO: 404 K/UL (ref 135–450)
PMV BLD AUTO: 7.6 FL (ref 5–10.5)
RBC # BLD AUTO: 4.29 M/UL (ref 4–5.2)
WBC # BLD AUTO: 6.2 K/UL (ref 4–11)

## 2024-04-19 PROCEDURE — 99214 OFFICE O/P EST MOD 30 MIN: CPT | Performed by: SURGERY
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
## 2024-04-20 LAB
ALBUMIN SERPL-MCNC: 4.2 G/DL (ref 3.4–5)
ALBUMIN/GLOB SERPL: 1.8 {RATIO} (ref 1.1–2.2)
ALP SERPL-CCNC: 70 U/L (ref 40–129)
ALT SERPL-CCNC: 14 U/L (ref 10–40)
ANION GAP SERPL CALCULATED.3IONS-SCNC: 13 MMOL/L (ref 3–16)
AST SERPL-CCNC: 13 U/L (ref 15–37)
BILIRUB SERPL-MCNC: <0.2 MG/DL (ref 0–1)
BUN SERPL-MCNC: 11 MG/DL (ref 7–20)
CALCIUM SERPL-MCNC: 9.2 MG/DL (ref 8.3–10.6)
CHLORIDE SERPL-SCNC: 102 MMOL/L (ref 99–110)
CO2 SERPL-SCNC: 22 MMOL/L (ref 21–32)
CREAT SERPL-MCNC: 0.6 MG/DL (ref 0.6–1.1)
GFR SERPLBLD CREATININE-BSD FMLA CKD-EPI: >90 ML/MIN/{1.73_M2}
GLUCOSE SERPL-MCNC: 104 MG/DL (ref 70–99)
MAGNESIUM SERPL-MCNC: 1.7 MG/DL (ref 1.8–2.4)
POTASSIUM SERPL-SCNC: 4.6 MMOL/L (ref 3.5–5.1)
PROT SERPL-MCNC: 6.5 G/DL (ref 6.4–8.2)
SODIUM SERPL-SCNC: 137 MMOL/L (ref 136–145)

## 2024-06-25 ENCOUNTER — OFFICE VISIT (OUTPATIENT)
Dept: OBGYN CLINIC | Age: 24
End: 2024-06-25
Payer: COMMERCIAL

## 2024-06-25 VITALS
TEMPERATURE: 98.3 F | HEART RATE: 94 BPM | SYSTOLIC BLOOD PRESSURE: 130 MMHG | WEIGHT: 284.2 LBS | DIASTOLIC BLOOD PRESSURE: 80 MMHG | BODY MASS INDEX: 44.51 KG/M2

## 2024-06-25 DIAGNOSIS — Z79.899 MEDICATION THERAPY CONTINUED: ICD-10-CM

## 2024-06-25 DIAGNOSIS — Z09 FOLLOW-UP EXAM: Primary | ICD-10-CM

## 2024-06-25 DIAGNOSIS — N80.9 ENDOMETRIOSIS DETERMINED BY LAPAROSCOPY: ICD-10-CM

## 2024-06-25 PROCEDURE — 99212 OFFICE O/P EST SF 10 MIN: CPT | Performed by: OBSTETRICS & GYNECOLOGY

## 2024-06-25 RX ORDER — NORGESTIMATE AND ETHINYL ESTRADIOL 0.25-0.035
1 KIT ORAL DAILY
Qty: 3 PACKET | Refills: 4 | Status: SHIPPED | OUTPATIENT
Start: 2024-06-25

## 2024-06-25 NOTE — PROGRESS NOTES
[facility]   Return Gyn Office Visit    CC:   Chief Complaint   Patient presents with    Follow-up     No questions or concerns        HPI:  23 y.o. who presents to [facility] for OCP check.   No LMP recorded (lmp unknown). (Menstrual status: Other - See Notes). Not really tracking   States he is doing well and periods have improved with medication.   Denies HA / vision changes / blood clots.   Denies any sexual activity, counseling offered.     Review of Systems - The following ROS was otherwise negative, except as noted in the HPI: constitutional, respiratory, cardiovascular, gastrointestinal, genitourinary    Objective:  Vitals:    06/25/24 1256   BP: 130/80   Pulse: 94   Temp: 98.3 °F (36.8 °C)     General: Alert, well appearing, no acute distress   Resp effort within normal limits   Abdomen: Soft, nontender, nondistended   Pelvic: Deferred   Skin: No visible lesions / rashes / concerning nevus   Extremities: No redness or tenderness, neg Pamella's sign  Osteopathic: no TART changes    Assessment/Plan   Diagnosis Orders   1. Follow-up exam        2. Medication therapy continued        3. Endometriosis determined by laparoscopy  norgestimate-ethinyl estradiol (SPRINTEC 28) 0.25-35 MG-MCG per tablet          Follow Up   Return in about 1 year (around 6/25/2025) for Annual or sooner if needed.    Camilla Blakely DO

## 2024-07-04 ENCOUNTER — TELEPHONE (OUTPATIENT)
Dept: OBGYN | Age: 24
End: 2024-07-04

## 2024-07-05 ENCOUNTER — NURSE ONLY (OUTPATIENT)
Dept: OBGYN CLINIC | Age: 24
End: 2024-07-05
Payer: COMMERCIAL

## 2024-07-05 DIAGNOSIS — N83.8 OVARIAN MASS, RIGHT: Primary | ICD-10-CM

## 2024-07-05 PROCEDURE — 36415 COLL VENOUS BLD VENIPUNCTURE: CPT | Performed by: OBSTETRICS & GYNECOLOGY

## 2024-07-05 NOTE — PROGRESS NOTES
Blood draw from [facility] x 2 attempt without difficulty. 1 SST, 0 PST, 0 LAV tubes drawn. Patient tolerated well. Jennifer Duarte LPN
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
## 2024-07-05 NOTE — TELEPHONE ENCOUNTER
Patient called to scheduled an amenorrhea visit this AM with Dr Blakely. Unable to give LMP. States that they are not tracking them and have no idea or even a guess at last time they had menses.     Patient also states that they had 4 positive pregnancy tests at home.     Routing to MD to review for next steps since we dont have an LPM.

## 2024-07-06 LAB — B-HCG SERPL EIA 3RD IS-ACNC: <5 MIU/ML

## 2024-07-23 ENCOUNTER — OFFICE VISIT (OUTPATIENT)
Dept: FAMILY MEDICINE CLINIC | Age: 24
End: 2024-07-23
Payer: COMMERCIAL

## 2024-07-23 ENCOUNTER — TELEPHONE (OUTPATIENT)
Dept: FAMILY MEDICINE CLINIC | Age: 24
End: 2024-07-23

## 2024-07-23 VITALS
OXYGEN SATURATION: 99 % | RESPIRATION RATE: 16 BRPM | HEART RATE: 86 BPM | WEIGHT: 286 LBS | SYSTOLIC BLOOD PRESSURE: 114 MMHG | BODY MASS INDEX: 44.79 KG/M2 | DIASTOLIC BLOOD PRESSURE: 76 MMHG | TEMPERATURE: 97.1 F

## 2024-07-23 DIAGNOSIS — R55 VASOVAGAL SYNCOPE: ICD-10-CM

## 2024-07-23 DIAGNOSIS — K21.9 GASTROESOPHAGEAL REFLUX DISEASE WITHOUT ESOPHAGITIS: ICD-10-CM

## 2024-07-23 DIAGNOSIS — F50.89 SELF INDUCED VOMITING: ICD-10-CM

## 2024-07-23 DIAGNOSIS — R11.2 NAUSEA AND VOMITING, UNSPECIFIED VOMITING TYPE: Primary | ICD-10-CM

## 2024-07-23 PROCEDURE — 99214 OFFICE O/P EST MOD 30 MIN: CPT | Performed by: NURSE PRACTITIONER

## 2024-07-23 RX ORDER — PROMETHAZINE HYDROCHLORIDE 12.5 MG/1
12.5 TABLET ORAL EVERY 8 HOURS PRN
Qty: 9 TABLET | Refills: 0 | Status: SHIPPED | OUTPATIENT
Start: 2024-07-23 | End: 2024-07-26

## 2024-07-23 RX ORDER — PANTOPRAZOLE SODIUM 40 MG/1
40 TABLET, DELAYED RELEASE ORAL
Qty: 30 TABLET | Refills: 5 | Status: SHIPPED | OUTPATIENT
Start: 2024-07-23

## 2024-07-23 ASSESSMENT — ENCOUNTER SYMPTOMS
NAUSEA: 1
DIARRHEA: 0
VOMITING: 1
COUGH: 0
ABDOMINAL PAIN: 1
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
## 2024-07-23 NOTE — TELEPHONE ENCOUNTER
Patient passed out twice with in the last week. Patient made appointment today with [facility].  Patient lives 45 minutes away and assured me they would be all right to drive.   Medical assistant Wanda reached out to patient.

## 2024-07-23 NOTE — PATIENT INSTRUCTIONS
Stop omeprazole  Start pantoprazole 40 mg once daily  Short course promethazine many, this medication can make you feel drowsy  Make an appointment with GI  Suspect this syncope is from vasovagal event from vomiting, need to try to control vomiting  Make sure you are hydrating well with noncaffeinated beverages, drink a sugar-free Gatorade if you are vomiting help replenish electrolytes

## 2024-07-29 ENCOUNTER — OFFICE VISIT (OUTPATIENT)
Age: 24
End: 2024-07-29

## 2024-07-29 VITALS
OXYGEN SATURATION: 97 % | HEART RATE: 96 BPM | TEMPERATURE: 98.2 F | WEIGHT: 289 LBS | BODY MASS INDEX: 45.26 KG/M2 | SYSTOLIC BLOOD PRESSURE: 139 MMHG | DIASTOLIC BLOOD PRESSURE: 88 MMHG

## 2024-07-29 DIAGNOSIS — S00.91XA ABRASION OF HEAD, INITIAL ENCOUNTER: Primary | ICD-10-CM

## 2024-07-29 NOTE — PROGRESS NOTES
appearance. He is not ill-appearing.   HENT:      Nose: Nose normal.      Mouth/Throat:      Mouth: Mucous membranes are moist.   Cardiovascular:      Rate and Rhythm: Normal rate and regular rhythm.      Heart sounds: Normal heart sounds.   Pulmonary:      Effort: Pulmonary effort is normal.      Breath sounds: Normal breath sounds.   Skin:     General: Skin is warm and dry.             Comments: Abrasion to left side of forehead. Bleeding controlled. No signs of infection.   Neurological:      Mental Status: He is alert.       An electronic signature was used to authenticate this note.    --SONIA Rodríguez - NP

## 2024-08-08 ENCOUNTER — HOSPITAL ENCOUNTER (OUTPATIENT)
Dept: ULTRASOUND IMAGING | Age: 24
Discharge: HOME | End: 2024-08-08
Attending: INTERNAL MEDICINE
Payer: COMMERCIAL

## 2024-08-08 ENCOUNTER — TELEPHONE (OUTPATIENT)
Dept: FAMILY MEDICINE CLINIC | Age: 24
End: 2024-08-08

## 2024-08-08 DIAGNOSIS — R11.14 BILIOUS VOMITING WITHOUT NAUSEA: ICD-10-CM

## 2024-08-08 PROCEDURE — 76700 US EXAM ABDOM COMPLETE: CPT

## 2024-08-08 NOTE — TELEPHONE ENCOUNTER
FYI:    Patient started  Lybalvi on 8/7/24.  It was prescribed by psychiatrist.  She cannot reach her office.  Patient left a message with . She is worried.       Patient's muscles are cramping up and having involuntarily stiffening of all limbs and  spine.  Patient is concerned it's seizures.  Advised to go to the ER.  She agreed.

## 2024-08-13 ENCOUNTER — OFFICE VISIT (OUTPATIENT)
Dept: FAMILY MEDICINE CLINIC | Age: 24
End: 2024-08-13
Payer: COMMERCIAL

## 2024-08-13 VITALS
HEART RATE: 98 BPM | TEMPERATURE: 97.1 F | DIASTOLIC BLOOD PRESSURE: 87 MMHG | SYSTOLIC BLOOD PRESSURE: 133 MMHG | BODY MASS INDEX: 45.89 KG/M2 | WEIGHT: 293 LBS | OXYGEN SATURATION: 99 % | RESPIRATION RATE: 16 BRPM

## 2024-08-13 DIAGNOSIS — D22.9 BENIGN SKIN MOLE: Primary | ICD-10-CM

## 2024-08-13 PROCEDURE — 99213 OFFICE O/P EST LOW 20 MIN: CPT | Performed by: NURSE PRACTITIONER

## 2024-08-13 SDOH — ECONOMIC STABILITY: FOOD INSECURITY: WITHIN THE PAST 12 MONTHS, YOU WORRIED THAT YOUR FOOD WOULD RUN OUT BEFORE YOU GOT MONEY TO BUY MORE.: NEVER TRUE

## 2024-08-13 SDOH — ECONOMIC STABILITY: FOOD INSECURITY: WITHIN THE PAST 12 MONTHS, THE FOOD YOU BOUGHT JUST DIDN'T LAST AND YOU DIDN'T HAVE MONEY TO GET MORE.: NEVER TRUE

## 2024-08-13 SDOH — ECONOMIC STABILITY: INCOME INSECURITY: HOW HARD IS IT FOR YOU TO PAY FOR THE VERY BASICS LIKE FOOD, HOUSING, MEDICAL CARE, AND HEATING?: NOT HARD AT ALL

## 2024-08-13 ASSESSMENT — PATIENT HEALTH QUESTIONNAIRE - PHQ9
7. TROUBLE CONCENTRATING ON THINGS, SUCH AS READING THE NEWSPAPER OR WATCHING TELEVISION: NOT AT ALL
2. FEELING DOWN, DEPRESSED OR HOPELESS: NOT AT ALL
SUM OF ALL RESPONSES TO PHQ QUESTIONS 1-9: 0
3. TROUBLE FALLING OR STAYING ASLEEP: NOT AT ALL
SUM OF ALL RESPONSES TO PHQ QUESTIONS 1-9: 0
DEPRESSION UNABLE TO ASSESS: FUNCTIONAL CAPACITY MOTIVATION LIMITS ACCURACY
10. IF YOU CHECKED OFF ANY PROBLEMS, HOW DIFFICULT HAVE THESE PROBLEMS MADE IT FOR YOU TO DO YOUR WORK, TAKE CARE OF THINGS AT HOME, OR GET ALONG WITH OTHER PEOPLE: NOT DIFFICULT AT ALL
8. MOVING OR SPEAKING SO SLOWLY THAT OTHER PEOPLE COULD HAVE NOTICED. OR THE OPPOSITE, BEING SO FIGETY OR RESTLESS THAT YOU HAVE BEEN MOVING AROUND A LOT MORE THAN USUAL: NOT AT ALL
5. POOR APPETITE OR OVEREATING: NOT AT ALL
SUM OF ALL RESPONSES TO PHQ9 QUESTIONS 1 & 2: 0
6. FEELING BAD ABOUT YOURSELF - OR THAT YOU ARE A FAILURE OR HAVE LET YOURSELF OR YOUR FAMILY DOWN: NOT AT ALL
9. THOUGHTS THAT YOU WOULD BE BETTER OFF DEAD, OR OF HURTING YOURSELF: NOT AT ALL
4. FEELING TIRED OR HAVING LITTLE ENERGY: NOT AT ALL
1. LITTLE INTEREST OR PLEASURE IN DOING THINGS: NOT AT ALL

## 2024-08-13 ASSESSMENT — ANXIETY QUESTIONNAIRES
7. FEELING AFRAID AS IF SOMETHING AWFUL MIGHT HAPPEN: NOT AT ALL
6. BECOMING EASILY ANNOYED OR IRRITABLE: NOT AT ALL
4. TROUBLE RELAXING: NOT AT ALL
GAD7 TOTAL SCORE: 0
5. BEING SO RESTLESS THAT IT IS HARD TO SIT STILL: NOT AT ALL
2. NOT BEING ABLE TO STOP OR CONTROL WORRYING: NOT AT ALL
1. FEELING NERVOUS, ANXIOUS, OR ON EDGE: NOT AT ALL
3. WORRYING TOO MUCH ABOUT DIFFERENT THINGS: NOT AT ALL
IF YOU CHECKED OFF ANY PROBLEMS ON THIS QUESTIONNAIRE, HOW DIFFICULT HAVE THESE PROBLEMS MADE IT FOR YOU TO DO YOUR WORK, TAKE CARE OF THINGS AT HOME, OR GET ALONG WITH OTHER PEOPLE: NOT DIFFICULT AT ALL

## 2024-08-13 ASSESSMENT — ENCOUNTER SYMPTOMS
SHORTNESS OF BREATH: 0
NAUSEA: 0
COUGH: 0
VOMITING: 0
DIARRHEA: 0

## 2024-08-13 NOTE — PROGRESS NOTES
2024     Chief Complaint   Patient presents with    Skin Problem     Dark spot on chest no pain and stated that it is slightly getting bigger        Nirmala Piedra (:  2000) is a 23 y.o. adult, here for evaluation of the following medical concerns:    GIRISH Hawkins is here today with complaints of a mole on the chest.  Noticed a few months ago thinks it is cold slightly bigger in size.  Has been no change in color or shape that patient is aware of.  Denies any associated pain itching discharge or bleeding.Has multiple moles on the shoulders and upper back.  Reports always wear sunscreen.  Patient is naturally like complected skin.  Patient denies any other complaints or concerns when asked.    Review of Systems   Constitutional:  Negative for chills, fatigue and fever.   Respiratory:  Negative for cough and shortness of breath.    Cardiovascular:  Negative for chest pain and leg swelling.   Gastrointestinal:  Negative for diarrhea, nausea and vomiting.   Neurological:  Negative for dizziness and headaches.   All other systems reviewed and are negative.      Prior to Visit Medications    Medication Sig Taking? Authorizing Provider   pantoprazole (PROTONIX) 40 MG tablet Take 1 tablet by mouth every morning (before breakfast) Yes Katherine Domingo APRN - CNP   norgestimate-ethinyl estradiol (SPRINTEC 28) 0.25-35 MG-MCG per tablet Take 1 tablet by mouth daily Take medication continuously for 2 months (I.e. skipping placebo week) then normal pack, repeat process for remainder of year Yes Camilla Blakely DO   metFORMIN (GLUCOPHAGE-XR) 500 MG extended release tablet TAKE 1 TABLET BY MOUTH DAILY WITH BREAKFAST  Patient taking differently: Take 2 tablets by mouth daily Yes Katherine Domingo APRN - CNP   ibuprofen (ADVIL;MOTRIN) 800 MG tablet Take 1 tablet by mouth every 8 hours as needed for Pain Yes Camlila Blakely DO   Multiple Vitamins-Minerals (THERAPEUTIC MULTIVITAMIN-MINERALS) tablet

## 2024-08-13 NOTE — PATIENT INSTRUCTIONS
The mole appears benign, things to monitor for are black discoloration very irregular shaped to the edges, if something starts to bleed very easily or does not heal.  I placed a dermatology referral you can see them for any worsening of symptoms or for skin check for skin cancer screening due to the number of moles on the chest and back  Wear SPF daily   [facility]

## 2024-09-19 ENCOUNTER — OFFICE VISIT (OUTPATIENT)
Age: 24
End: 2024-09-19

## 2024-09-19 VITALS
DIASTOLIC BLOOD PRESSURE: 83 MMHG | WEIGHT: 293 LBS | HEART RATE: 89 BPM | BODY MASS INDEX: 46.96 KG/M2 | OXYGEN SATURATION: 97 % | SYSTOLIC BLOOD PRESSURE: 131 MMHG | TEMPERATURE: 97.9 F

## 2024-09-19 DIAGNOSIS — F25.9 SCHIZOAFFECTIVE DISORDER, UNSPECIFIED TYPE (HCC): Primary | ICD-10-CM

## 2024-09-19 RX ORDER — OLANZAPINE 5 MG/1
5 TABLET ORAL NIGHTLY
Qty: 30 TABLET | Refills: 3 | Status: SHIPPED | OUTPATIENT
Start: 2024-09-19

## 2024-09-19 RX ORDER — OLANZAPINE 5 MG/1
5 TABLET ORAL NIGHTLY
COMMUNITY

## 2024-09-26 ENCOUNTER — OFFICE VISIT (OUTPATIENT)
Dept: FAMILY MEDICINE CLINIC | Age: 24
End: 2024-09-26
Payer: COMMERCIAL

## 2024-09-26 VITALS
RESPIRATION RATE: 16 BRPM | OXYGEN SATURATION: 99 % | BODY MASS INDEX: 45.99 KG/M2 | TEMPERATURE: 97 F | HEIGHT: 67 IN | WEIGHT: 293 LBS | HEART RATE: 98 BPM | DIASTOLIC BLOOD PRESSURE: 81 MMHG | SYSTOLIC BLOOD PRESSURE: 123 MMHG

## 2024-09-26 DIAGNOSIS — R09.81 NASAL CONGESTION: ICD-10-CM

## 2024-09-26 DIAGNOSIS — R06.09 DYSPNEA ON EXERTION: Primary | ICD-10-CM

## 2024-09-26 PROCEDURE — 99213 OFFICE O/P EST LOW 20 MIN: CPT | Performed by: SURGERY

## 2024-09-26 RX ORDER — FLUTICASONE PROPIONATE 50 MCG
2 SPRAY, SUSPENSION (ML) NASAL DAILY
Qty: 16 G | Refills: 0 | Status: SHIPPED | OUTPATIENT
Start: 2024-09-26

## 2024-09-26 RX ORDER — ALBUTEROL SULFATE 90 UG/1
2 INHALANT RESPIRATORY (INHALATION) 4 TIMES DAILY PRN
Qty: 18 G | Refills: 0 | Status: SHIPPED | OUTPATIENT
Start: 2024-09-26

## 2024-09-26 SDOH — ECONOMIC STABILITY: FOOD INSECURITY: WITHIN THE PAST 12 MONTHS, THE FOOD YOU BOUGHT JUST DIDN'T LAST AND YOU DIDN'T HAVE MONEY TO GET MORE.: NEVER TRUE

## 2024-09-26 SDOH — ECONOMIC STABILITY: FOOD INSECURITY: WITHIN THE PAST 12 MONTHS, YOU WORRIED THAT YOUR FOOD WOULD RUN OUT BEFORE YOU GOT MONEY TO BUY MORE.: NEVER TRUE

## 2024-09-26 SDOH — ECONOMIC STABILITY: INCOME INSECURITY: HOW HARD IS IT FOR YOU TO PAY FOR THE VERY BASICS LIKE FOOD, HOUSING, MEDICAL CARE, AND HEATING?: NOT HARD AT ALL

## 2024-09-26 ASSESSMENT — ENCOUNTER SYMPTOMS
SINUS PRESSURE: 0
NAUSEA: 0
RHINORRHEA: 0
COUGH: 0
DIARRHEA: 0
SORE THROAT: 0
VOMITING: 0
SINUS PAIN: 0
SHORTNESS OF BREATH: 1

## 2024-09-26 ASSESSMENT — PATIENT HEALTH QUESTIONNAIRE - PHQ9
10. IF YOU CHECKED OFF ANY PROBLEMS, HOW DIFFICULT HAVE THESE PROBLEMS MADE IT FOR YOU TO DO YOUR WORK, TAKE CARE OF THINGS AT HOME, OR GET ALONG WITH OTHER PEOPLE: NOT DIFFICULT AT ALL
SUM OF ALL RESPONSES TO PHQ QUESTIONS 1-9: 0
3. TROUBLE FALLING OR STAYING ASLEEP: NOT AT ALL
SUM OF ALL RESPONSES TO PHQ QUESTIONS 1-9: 0
7. TROUBLE CONCENTRATING ON THINGS, SUCH AS READING THE NEWSPAPER OR WATCHING TELEVISION: NOT AT ALL
1. LITTLE INTEREST OR PLEASURE IN DOING THINGS: NOT AT ALL
SUM OF ALL RESPONSES TO PHQ QUESTIONS 1-9: 0
2. FEELING DOWN, DEPRESSED OR HOPELESS: NOT AT ALL
5. POOR APPETITE OR OVEREATING: NOT AT ALL
SUM OF ALL RESPONSES TO PHQ9 QUESTIONS 1 & 2: 0
DEPRESSION UNABLE TO ASSESS: FUNCTIONAL CAPACITY MOTIVATION LIMITS ACCURACY
9. THOUGHTS THAT YOU WOULD BE BETTER OFF DEAD, OR OF HURTING YOURSELF: NOT AT ALL
4. FEELING TIRED OR HAVING LITTLE ENERGY: NOT AT ALL
6. FEELING BAD ABOUT YOURSELF - OR THAT YOU ARE A FAILURE OR HAVE LET YOURSELF OR YOUR FAMILY DOWN: NOT AT ALL
SUM OF ALL RESPONSES TO PHQ QUESTIONS 1-9: 0
8. MOVING OR SPEAKING SO SLOWLY THAT OTHER PEOPLE COULD HAVE NOTICED. OR THE OPPOSITE, BEING SO FIGETY OR RESTLESS THAT YOU HAVE BEEN MOVING AROUND A LOT MORE THAN USUAL: NOT AT ALL

## 2024-09-26 ASSESSMENT — ANXIETY QUESTIONNAIRES
2. NOT BEING ABLE TO STOP OR CONTROL WORRYING: NOT AT ALL
4. TROUBLE RELAXING: NOT AT ALL
7. FEELING AFRAID AS IF SOMETHING AWFUL MIGHT HAPPEN: NOT AT ALL
GAD7 TOTAL SCORE: 0
3. WORRYING TOO MUCH ABOUT DIFFERENT THINGS: NOT AT ALL
1. FEELING NERVOUS, ANXIOUS, OR ON EDGE: NOT AT ALL
6. BECOMING EASILY ANNOYED OR IRRITABLE: NOT AT ALL
IF YOU CHECKED OFF ANY PROBLEMS ON THIS QUESTIONNAIRE, HOW DIFFICULT HAVE THESE PROBLEMS MADE IT FOR YOU TO DO YOUR WORK, TAKE CARE OF THINGS AT HOME, OR GET ALONG WITH OTHER PEOPLE: NOT DIFFICULT AT ALL
5. BEING SO RESTLESS THAT IT IS HARD TO SIT STILL: NOT AT ALL

## 2024-10-16 ENCOUNTER — OFFICE VISIT (OUTPATIENT)
Dept: FAMILY MEDICINE CLINIC | Age: 24
End: 2024-10-16

## 2024-10-16 VITALS
TEMPERATURE: 99.1 F | SYSTOLIC BLOOD PRESSURE: 121 MMHG | RESPIRATION RATE: 16 BRPM | DIASTOLIC BLOOD PRESSURE: 85 MMHG | HEART RATE: 79 BPM | BODY MASS INDEX: 47.61 KG/M2 | OXYGEN SATURATION: 99 % | WEIGHT: 293 LBS

## 2024-10-16 DIAGNOSIS — Z48.02 ENCOUNTER FOR REMOVAL OF SUTURES: Primary | ICD-10-CM

## 2024-10-16 ASSESSMENT — PATIENT HEALTH QUESTIONNAIRE - PHQ9
4. FEELING TIRED OR HAVING LITTLE ENERGY: NOT AT ALL
2. FEELING DOWN, DEPRESSED OR HOPELESS: NOT AT ALL
5. POOR APPETITE OR OVEREATING: NOT AT ALL
9. THOUGHTS THAT YOU WOULD BE BETTER OFF DEAD, OR OF HURTING YOURSELF: NOT AT ALL
3. TROUBLE FALLING OR STAYING ASLEEP: NOT AT ALL
SUM OF ALL RESPONSES TO PHQ QUESTIONS 1-9: 0
SUM OF ALL RESPONSES TO PHQ QUESTIONS 1-9: 0
1. LITTLE INTEREST OR PLEASURE IN DOING THINGS: NOT AT ALL
6. FEELING BAD ABOUT YOURSELF - OR THAT YOU ARE A FAILURE OR HAVE LET YOURSELF OR YOUR FAMILY DOWN: NOT AT ALL
SUM OF ALL RESPONSES TO PHQ QUESTIONS 1-9: 0
SUM OF ALL RESPONSES TO PHQ9 QUESTIONS 1 & 2: 0
7. TROUBLE CONCENTRATING ON THINGS, SUCH AS READING THE NEWSPAPER OR WATCHING TELEVISION: NOT AT ALL
8. MOVING OR SPEAKING SO SLOWLY THAT OTHER PEOPLE COULD HAVE NOTICED. OR THE OPPOSITE, BEING SO FIGETY OR RESTLESS THAT YOU HAVE BEEN MOVING AROUND A LOT MORE THAN USUAL: NOT AT ALL
10. IF YOU CHECKED OFF ANY PROBLEMS, HOW DIFFICULT HAVE THESE PROBLEMS MADE IT FOR YOU TO DO YOUR WORK, TAKE CARE OF THINGS AT HOME, OR GET ALONG WITH OTHER PEOPLE: NOT DIFFICULT AT ALL
SUM OF ALL RESPONSES TO PHQ QUESTIONS 1-9: 0
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
## 2024-11-10 ENCOUNTER — OFFICE VISIT (OUTPATIENT)
Age: 24
End: 2024-11-10

## 2024-11-10 VITALS
TEMPERATURE: 97.9 F | DIASTOLIC BLOOD PRESSURE: 88 MMHG | BODY MASS INDEX: 45.99 KG/M2 | HEIGHT: 67 IN | OXYGEN SATURATION: 98 % | RESPIRATION RATE: 16 BRPM | SYSTOLIC BLOOD PRESSURE: 148 MMHG | WEIGHT: 293 LBS | HEART RATE: 98 BPM

## 2024-11-10 DIAGNOSIS — R03.0 ELEVATED BLOOD PRESSURE READING: ICD-10-CM

## 2024-11-10 DIAGNOSIS — R45.89 ANXIETY ABOUT HEALTH: Primary | ICD-10-CM

## 2024-11-26 ENCOUNTER — CARE COORDINATION (OUTPATIENT)
Dept: CARE COORDINATION | Age: 24
End: 2024-11-26

## 2024-11-26 NOTE — CARE COORDINATION
Ambulatory Care Coordination Note     11/26/2024      Patient outreach attempt by this ACM today to perform care management follow up . ACM was unable to reach the patient by telephone today;   ED F/U     ACM: Vane Bills RN     PCP/Specialist follow up:       Follow Up:   Plan for next ACM outreach in approximately 1-2 days  to complete:  ED f/U .

## 2024-12-03 ENCOUNTER — CARE COORDINATION (OUTPATIENT)
Dept: CARE COORDINATION | Age: 24
End: 2024-12-03

## 2024-12-03 NOTE — CARE COORDINATION
Ambulatory Care Coordination Note     12/3/2024      Patient outreach attempt by this ACM today to offer care management services. ACM was unable to reach the patient by telephone today;   left voice message requesting a return phone call to this ACM.     ACM: Vane Bills RN     PCP/Specialist follow up:       Follow Up:   Plan for next ACM outreach in approximately 1 week to complete:  - outreach attempt to offer care management services.

## 2024-12-10 ENCOUNTER — CARE COORDINATION (OUTPATIENT)
Dept: CARE COORDINATION | Age: 24
End: 2024-12-10

## 2024-12-10 ENCOUNTER — OFFICE VISIT (OUTPATIENT)
Dept: FAMILY MEDICINE CLINIC | Age: 24
End: 2024-12-10
Payer: MEDICARE

## 2024-12-10 VITALS
OXYGEN SATURATION: 99 % | WEIGHT: 293 LBS | DIASTOLIC BLOOD PRESSURE: 82 MMHG | SYSTOLIC BLOOD PRESSURE: 124 MMHG | RESPIRATION RATE: 16 BRPM | HEART RATE: 101 BPM | BODY MASS INDEX: 48.08 KG/M2 | TEMPERATURE: 98.6 F

## 2024-12-10 DIAGNOSIS — R11.10 VOMITING AND DIARRHEA: Primary | ICD-10-CM

## 2024-12-10 DIAGNOSIS — L50.9 HIVES: ICD-10-CM

## 2024-12-10 DIAGNOSIS — R19.7 VOMITING AND DIARRHEA: Primary | ICD-10-CM

## 2024-12-10 PROCEDURE — G8484 FLU IMMUNIZE NO ADMIN: HCPCS | Performed by: STUDENT IN AN ORGANIZED HEALTH CARE EDUCATION/TRAINING PROGRAM

## 2024-12-10 PROCEDURE — 99214 OFFICE O/P EST MOD 30 MIN: CPT | Performed by: STUDENT IN AN ORGANIZED HEALTH CARE EDUCATION/TRAINING PROGRAM

## 2024-12-10 PROCEDURE — G8417 CALC BMI ABV UP PARAM F/U: HCPCS | Performed by: STUDENT IN AN ORGANIZED HEALTH CARE EDUCATION/TRAINING PROGRAM

## 2024-12-10 PROCEDURE — 1036F TOBACCO NON-USER: CPT | Performed by: STUDENT IN AN ORGANIZED HEALTH CARE EDUCATION/TRAINING PROGRAM

## 2024-12-10 PROCEDURE — G8427 DOCREV CUR MEDS BY ELIG CLIN: HCPCS | Performed by: STUDENT IN AN ORGANIZED HEALTH CARE EDUCATION/TRAINING PROGRAM

## 2024-12-10 RX ORDER — CETIRIZINE HYDROCHLORIDE 10 MG/1
10 TABLET ORAL DAILY
Qty: 20 TABLET | Refills: 0 | Status: SHIPPED | OUTPATIENT
Start: 2024-12-10 | End: 2024-12-10

## 2024-12-10 RX ORDER — TRIAMCINOLONE ACETONIDE 1 MG/G
OINTMENT TOPICAL 2 TIMES DAILY
Qty: 30 G | Refills: 0 | Status: SHIPPED | OUTPATIENT
Start: 2024-12-10 | End: 2024-12-17

## 2024-12-10 RX ORDER — METHYLPREDNISOLONE 4 MG/1
TABLET ORAL
Qty: 1 KIT | Refills: 0 | Status: SHIPPED | OUTPATIENT
Start: 2024-12-10 | End: 2024-12-16

## 2024-12-10 RX ORDER — CLOTRIMAZOLE 1 %
CREAM (GRAM) TOPICAL
Refills: 0 | Status: CANCELLED | OUTPATIENT
Start: 2024-12-10

## 2024-12-10 RX ORDER — FLUCONAZOLE 150 MG/1
150 TABLET ORAL ONCE
Qty: 1 TABLET | Refills: 0 | Status: CANCELLED
Start: 2024-12-10 | End: 2024-12-10

## 2024-12-10 RX ORDER — CETIRIZINE HYDROCHLORIDE 10 MG/1
10 TABLET ORAL DAILY
Qty: 10 TABLET | Refills: 0 | Status: SHIPPED | OUTPATIENT
Start: 2024-12-10

## 2024-12-10 SDOH — ECONOMIC STABILITY: FOOD INSECURITY: WITHIN THE PAST 12 MONTHS, YOU WORRIED THAT YOUR FOOD WOULD RUN OUT BEFORE YOU GOT MONEY TO BUY MORE.: NEVER TRUE

## 2024-12-10 SDOH — ECONOMIC STABILITY: FOOD INSECURITY: WITHIN THE PAST 12 MONTHS, THE FOOD YOU BOUGHT JUST DIDN'T LAST AND YOU DIDN'T HAVE MONEY TO GET MORE.: NEVER TRUE

## 2024-12-10 SDOH — ECONOMIC STABILITY: INCOME INSECURITY: HOW HARD IS IT FOR YOU TO PAY FOR THE VERY BASICS LIKE FOOD, HOUSING, MEDICAL CARE, AND HEATING?: NOT VERY HARD

## 2024-12-10 ASSESSMENT — PATIENT HEALTH QUESTIONNAIRE - PHQ9
SUM OF ALL RESPONSES TO PHQ QUESTIONS 1-9: 0
9. THOUGHTS THAT YOU WOULD BE BETTER OFF DEAD, OR OF HURTING YOURSELF: NOT AT ALL
7. TROUBLE CONCENTRATING ON THINGS, SUCH AS READING THE NEWSPAPER OR WATCHING TELEVISION: NOT AT ALL
2. FEELING DOWN, DEPRESSED OR HOPELESS: NOT AT ALL
1. LITTLE INTEREST OR PLEASURE IN DOING THINGS: NOT AT ALL
SUM OF ALL RESPONSES TO PHQ QUESTIONS 1-9: 0
5. POOR APPETITE OR OVEREATING: NOT AT ALL
SUM OF ALL RESPONSES TO PHQ QUESTIONS 1-9: 0
8. MOVING OR SPEAKING SO SLOWLY THAT OTHER PEOPLE COULD HAVE NOTICED. OR THE OPPOSITE, BEING SO FIGETY OR RESTLESS THAT YOU HAVE BEEN MOVING AROUND A LOT MORE THAN USUAL: NOT AT ALL
10. IF YOU CHECKED OFF ANY PROBLEMS, HOW DIFFICULT HAVE THESE PROBLEMS MADE IT FOR YOU TO DO YOUR WORK, TAKE CARE OF THINGS AT HOME, OR GET ALONG WITH OTHER PEOPLE: NOT DIFFICULT AT ALL
SUM OF ALL RESPONSES TO PHQ QUESTIONS 1-9: 0
SUM OF ALL RESPONSES TO PHQ9 QUESTIONS 1 & 2: 0
3. TROUBLE FALLING OR STAYING ASLEEP: NOT AT ALL
6. FEELING BAD ABOUT YOURSELF - OR THAT YOU ARE A FAILURE OR HAVE LET YOURSELF OR YOUR FAMILY DOWN: NOT AT ALL
4. FEELING TIRED OR HAVING LITTLE ENERGY: NOT AT ALL

## 2024-12-10 NOTE — CARE COORDINATION
Ambulatory Care Coordination Note     12/10/2024      patient outreach attempt by this ACM today to offer care management services. ACM was unable to reach the patient by telephone today;   left voice message requesting a return phone call to this ACM.  mychart message sent requesting patient to contact this AC.      No further Ambulatory Care Manager follow up scheduled.

## 2024-12-10 NOTE — PROGRESS NOTES
[facility]  [address]  [address]  [place], [place] [address]  Tel: [phone]      12/10/2024       Subjective    SUBJECTIVE/OBJECTIVE  HPI    Nirmala Piedra (:  2000) is a 24 y.o. adult, here for evaluation of the following medical concerns:  Chief Complaint   Patient presents with    Diarrhea    Rash     Itchy no burning   X 7 days     Patient is a 24 y.o. adult  has a past medical history of Anxiety, Bipolar 1 disorder (HCC), Borderline personality disorder (HCC), Bulimia, Depression, Type 2 diabetes mellitus, without long-term current use of insulin (HCC). who presents  hives for a couple days, headache, diarrhea, vomiting    Rash started and few hours later stomach issues. Rash under arms , stomach and iinner thighs. Has tried hydrocortisone and benadryl.   Every meal has diarrhea,, 3-4 times a day.       Urticaria  This is a new problem. The current episode started in the past 7 days. Location: upper abdomne, inner thigh, under pannus and under arm. The rash is characterized by redness, itchiness, dryness and blistering. Associated with: Stopped deodorant, Sensitive skin lotions, soaps. Associated symptoms include diarrhea and vomiting (1 episodes). Pertinent negatives include no anorexia or fever.   GI Problem  The primary symptoms include nausea, vomiting (1 episodes), diarrhea and rash. Primary symptoms do not include fever, weight loss, abdominal pain, melena, hematemesis, jaundice or dysuria. The illness began 6 to 7 days ago. The onset was gradual.   The diarrhea began 6 to 7 days ago. The diarrhea is watery.   The rash is associated with itching.   The illness is also significant for itching. The illness does not include chills, anorexia, dysphagia, bloating, constipation or back pain. Associated medical issues do not include gallstones, PUD, irritable bowel syndrome or diverticulitis.   Patient was seen in the emergency room 2024 after a dog bite right

## 2025-01-09 DIAGNOSIS — F25.9 SCHIZOAFFECTIVE DISORDER, UNSPECIFIED TYPE (HCC): ICD-10-CM

## 2025-01-28 ENCOUNTER — OFFICE VISIT (OUTPATIENT)
Dept: FAMILY MEDICINE CLINIC | Age: 25
End: 2025-01-28
Payer: MEDICARE

## 2025-01-28 VITALS
SYSTOLIC BLOOD PRESSURE: 123 MMHG | TEMPERATURE: 97.1 F | WEIGHT: 293 LBS | OXYGEN SATURATION: 99 % | BODY MASS INDEX: 48.08 KG/M2 | RESPIRATION RATE: 16 BRPM | DIASTOLIC BLOOD PRESSURE: 83 MMHG | HEART RATE: 97 BPM

## 2025-01-28 DIAGNOSIS — E66.01 CLASS 3 SEVERE OBESITY WITH SERIOUS COMORBIDITY AND BODY MASS INDEX (BMI) OF 45.0 TO 49.9 IN ADULT, UNSPECIFIED OBESITY TYPE: ICD-10-CM

## 2025-01-28 DIAGNOSIS — F25.9 SCHIZOAFFECTIVE DISORDER, UNSPECIFIED TYPE (HCC): ICD-10-CM

## 2025-01-28 DIAGNOSIS — E66.813 CLASS 3 SEVERE OBESITY WITH SERIOUS COMORBIDITY AND BODY MASS INDEX (BMI) OF 45.0 TO 49.9 IN ADULT, UNSPECIFIED OBESITY TYPE: ICD-10-CM

## 2025-01-28 DIAGNOSIS — E11.9 TYPE 2 DIABETES MELLITUS WITHOUT COMPLICATION, WITHOUT LONG-TERM CURRENT USE OF INSULIN (HCC): ICD-10-CM

## 2025-01-28 DIAGNOSIS — Z48.02 VISIT FOR SUTURE REMOVAL: ICD-10-CM

## 2025-01-28 DIAGNOSIS — F50.22 MODERATE BULIMIA NERVOSA: ICD-10-CM

## 2025-01-28 DIAGNOSIS — S41.111D LACERATION OF RIGHT UPPER EXTREMITY, SUBSEQUENT ENCOUNTER: Primary | ICD-10-CM

## 2025-01-28 LAB — HBA1C MFR BLD: 7.2 %

## 2025-01-28 PROCEDURE — 2022F DILAT RTA XM EVC RTNOPTHY: CPT | Performed by: NURSE PRACTITIONER

## 2025-01-28 PROCEDURE — 3051F HG A1C>EQUAL 7.0%<8.0%: CPT | Performed by: NURSE PRACTITIONER

## 2025-01-28 PROCEDURE — G8417 CALC BMI ABV UP PARAM F/U: HCPCS | Performed by: NURSE PRACTITIONER

## 2025-01-28 PROCEDURE — 83036 HEMOGLOBIN GLYCOSYLATED A1C: CPT | Performed by: NURSE PRACTITIONER

## 2025-01-28 PROCEDURE — 99214 OFFICE O/P EST MOD 30 MIN: CPT | Performed by: NURSE PRACTITIONER

## 2025-01-28 PROCEDURE — G8427 DOCREV CUR MEDS BY ELIG CLIN: HCPCS | Performed by: NURSE PRACTITIONER

## 2025-01-28 PROCEDURE — 1036F TOBACCO NON-USER: CPT | Performed by: NURSE PRACTITIONER

## 2025-01-28 SDOH — ECONOMIC STABILITY: INCOME INSECURITY: IN THE LAST 12 MONTHS, WAS THERE A TIME WHEN YOU WERE NOT ABLE TO PAY THE MORTGAGE OR RENT ON TIME?: YES

## 2025-01-28 SDOH — ECONOMIC STABILITY: FOOD INSECURITY: WITHIN THE PAST 12 MONTHS, YOU WORRIED THAT YOUR FOOD WOULD RUN OUT BEFORE YOU GOT MONEY TO BUY MORE.: NEVER TRUE

## 2025-01-28 SDOH — ECONOMIC STABILITY: FOOD INSECURITY: WITHIN THE PAST 12 MONTHS, THE FOOD YOU BOUGHT JUST DIDN'T LAST AND YOU DIDN'T HAVE MONEY TO GET MORE.: NEVER TRUE

## 2025-01-28 ASSESSMENT — PATIENT HEALTH QUESTIONNAIRE - PHQ9
1. LITTLE INTEREST OR PLEASURE IN DOING THINGS: NOT AT ALL
9. THOUGHTS THAT YOU WOULD BE BETTER OFF DEAD, OR OF HURTING YOURSELF: NOT AT ALL
6. FEELING BAD ABOUT YOURSELF - OR THAT YOU ARE A FAILURE OR HAVE LET YOURSELF OR YOUR FAMILY DOWN: NOT AT ALL
5. POOR APPETITE OR OVEREATING: NOT AT ALL
3. TROUBLE FALLING OR STAYING ASLEEP: NOT AT ALL
3. TROUBLE FALLING OR STAYING ASLEEP: NOT AT ALL
10. IF YOU CHECKED OFF ANY PROBLEMS, HOW DIFFICULT HAVE THESE PROBLEMS MADE IT FOR YOU TO DO YOUR WORK, TAKE CARE OF THINGS AT HOME, OR GET ALONG WITH OTHER PEOPLE: NOT DIFFICULT AT ALL
7. TROUBLE CONCENTRATING ON THINGS, SUCH AS READING THE NEWSPAPER OR WATCHING TELEVISION: NOT AT ALL
SUM OF ALL RESPONSES TO PHQ9 QUESTIONS 1 & 2: 0
10. IF YOU CHECKED OFF ANY PROBLEMS, HOW DIFFICULT HAVE THESE PROBLEMS MADE IT FOR YOU TO DO YOUR WORK, TAKE CARE OF THINGS AT HOME, OR GET ALONG WITH OTHER PEOPLE: NOT DIFFICULT AT ALL
SUM OF ALL RESPONSES TO PHQ QUESTIONS 1-9: 0
SUM OF ALL RESPONSES TO PHQ QUESTIONS 1-9: 0
7. TROUBLE CONCENTRATING ON THINGS, SUCH AS READING THE NEWSPAPER OR WATCHING TELEVISION: NOT AT ALL
5. POOR APPETITE OR OVEREATING: NOT AT ALL
SUM OF ALL RESPONSES TO PHQ QUESTIONS 1-9: 0
8. MOVING OR SPEAKING SO SLOWLY THAT OTHER PEOPLE COULD HAVE NOTICED. OR THE OPPOSITE, BEING SO FIGETY OR RESTLESS THAT YOU HAVE BEEN MOVING AROUND A LOT MORE THAN USUAL: NOT AT ALL
2. FEELING DOWN, DEPRESSED OR HOPELESS: NOT AT ALL
9. THOUGHTS THAT YOU WOULD BE BETTER OFF DEAD, OR OF HURTING YOURSELF: NOT AT ALL
4. FEELING TIRED OR HAVING LITTLE ENERGY: NOT AT ALL
8. MOVING OR SPEAKING SO SLOWLY THAT OTHER PEOPLE COULD HAVE NOTICED. OR THE OPPOSITE - BEING SO FIDGETY OR RESTLESS THAT YOU HAVE BEEN MOVING AROUND A LOT MORE THAN USUAL: NOT AT ALL
2. FEELING DOWN, DEPRESSED OR HOPELESS: NOT AT ALL
6. FEELING BAD ABOUT YOURSELF - OR THAT YOU ARE A FAILURE OR HAVE LET YOURSELF OR YOUR FAMILY DOWN: NOT AT ALL
4. FEELING TIRED OR HAVING LITTLE ENERGY: NOT AT ALL
SUM OF ALL RESPONSES TO PHQ QUESTIONS 1-9: 0
SUM OF ALL RESPONSES TO PHQ QUESTIONS 1-9: 0
1. LITTLE INTEREST OR PLEASURE IN DOING THINGS: NOT AT ALL

## 2025-01-28 ASSESSMENT — ANXIETY QUESTIONNAIRES
5. BEING SO RESTLESS THAT IT IS HARD TO SIT STILL: NOT AT ALL
IF YOU CHECKED OFF ANY PROBLEMS ON THIS QUESTIONNAIRE, HOW DIFFICULT HAVE THESE PROBLEMS MADE IT FOR YOU TO DO YOUR WORK, TAKE CARE OF THINGS AT HOME, OR GET ALONG WITH OTHER PEOPLE: NOT DIFFICULT AT ALL
GAD7 TOTAL SCORE: 0
6. BECOMING EASILY ANNOYED OR IRRITABLE: NOT AT ALL
2. NOT BEING ABLE TO STOP OR CONTROL WORRYING: NOT AT ALL
1. FEELING NERVOUS, ANXIOUS, OR ON EDGE: NOT AT ALL
4. TROUBLE RELAXING: NOT AT ALL
3. WORRYING TOO MUCH ABOUT DIFFERENT THINGS: NOT AT ALL
7. FEELING AFRAID AS IF SOMETHING AWFUL MIGHT HAPPEN: NOT AT ALL

## 2025-01-28 ASSESSMENT — ENCOUNTER SYMPTOMS
BACK PAIN: 0
VOMITING: 1
DIARRHEA: 1
NAUSEA: 1
ABDOMINAL PAIN: 0
CONSTIPATION: 0

## 2025-01-28 NOTE — PATIENT INSTRUCTIONS
- A1C is 7.2  - start semaglutide 0.25 mg weekly injection, take on the same day each week   - keep working on diet and exercise   - make appointment with therapist for eating disorder  - work on exercise  - keep arm clean and dry

## 2025-01-28 NOTE — PROGRESS NOTES
2025     Chief Complaint   Patient presents with    Suture / Staple Removal     Stitch removal - right arm total 4 - placed last Tuesday        Nirmala Piedra (:  2000) is a 24 y.o. adult, here for evaluation of the following medical concerns:    HPI  Here for suture removal from right forearm, sutures were placed on 2025 in ED patient-self cutting.  Denies pain, drainage, fever.      DM II:  Also reports was told A1c is in the 7 range.  Psychiatrist has been increasing metformin is on 2000 mg daily which has been the dose for the last few months.  A1c today 7.2.  Struggling with managing weight and diet was eating disorder.  Has a history of bulimia.  Used to purchase 6-7 times a day, now only purging 1-2 times a week.  Discussed seeing therapist for eating disorder, previously was [facility] at [facility] which was helpful but has aged out.    Following with psychiatry [facility] on Zyprexa and Invega and Prozac.    Review of Systems   Constitutional:  Negative for chills and fever.   Gastrointestinal:  Positive for diarrhea, nausea and vomiting (1 episodes). Negative for abdominal pain and constipation.   Genitourinary:  Negative for dysuria.   Musculoskeletal:  Negative for back pain.   Skin:  Positive for wound.       Prior to Visit Medications    Medication Sig Taking? Authorizing Provider   Semaglutide,0.25 or 0.5MG/DOS, 2 MG/3ML SOPN Inject 0.25 mg into the skin every 7 days for 4 doses Yes Katherine Domingo APRN - CNP   OLANZapine (ZYPREXA) 5 MG tablet Take 1 tablet by mouth nightly Yes Ruthann Jones APRN - CNP   pantoprazole (PROTONIX) 40 MG tablet Take 1 tablet by mouth every morning (before breakfast) Yes Katherine Domingo APRN - CNP   norgestimate-ethinyl estradiol (SPRINTEC 28) 0.25-35 MG-MCG per tablet Take 1 tablet by mouth daily Take medication continuously for 2 months (I.e. skipping placebo week) then normal pack, repeat process for remainder of year Yes Reddy

## 2025-01-30 ENCOUNTER — TELEPHONE (OUTPATIENT)
Dept: FAMILY MEDICINE CLINIC | Age: 25
End: 2025-01-30

## 2025-01-30 DIAGNOSIS — K21.9 GASTROESOPHAGEAL REFLUX DISEASE WITHOUT ESOPHAGITIS: ICD-10-CM

## 2025-01-30 DIAGNOSIS — E11.9 TYPE 2 DIABETES MELLITUS WITHOUT COMPLICATION, WITHOUT LONG-TERM CURRENT USE OF INSULIN (HCC): ICD-10-CM

## 2025-01-30 NOTE — TELEPHONE ENCOUNTER
Patient calling in said the Ozempic will cost her 800.00 and that her insurance company told her if she gets a 90 day supply it will only cost her 30.00   Patient asking that the medication be reorder as a 90 day supply.  Please advise patient     [id number] [address] - P [phone] - F [phone] [[id number]]

## 2025-01-30 NOTE — TELEPHONE ENCOUNTER
Future Appointments   Date Time Provider Department Center   4/28/2025 10:20 AM Katherine Domingo APRN - CNP [facility]     LOV 1/28/2025

## 2025-01-31 RX ORDER — PANTOPRAZOLE SODIUM 40 MG/1
40 TABLET, DELAYED RELEASE ORAL
Qty: 30 TABLET | Refills: 5 | Status: SHIPPED | OUTPATIENT
Start: 2025-01-31

## 2025-02-04 ENCOUNTER — TELEPHONE (OUTPATIENT)
Dept: ADMINISTRATIVE | Age: 25
End: 2025-02-04

## 2025-02-04 NOTE — TELEPHONE ENCOUNTER
Submitted PA for Ozempic (0.25 or 0.5 MG/DOSE) 2MG/3ML pen-injectors  Via CMM Key: [id number] STATUS: PENDING.    Follow up done daily; if no decision with in three days we will refax.  If another three days goes by with no decision will call the insurance for status.

## 2025-02-05 ENCOUNTER — TELEPHONE (OUTPATIENT)
Dept: FAMILY MEDICINE CLINIC | Age: 25
End: 2025-02-05

## 2025-02-05 NOTE — TELEPHONE ENCOUNTER
The medication is APPROVED UNTIL FURTHER NOTICE.     If this requires a response please respond to the pool ( P [facility] MEDICATION PRE-AUTH).      Thank you please advise patient.

## 2025-02-05 NOTE — TELEPHONE ENCOUNTER
Pt. Called in stating that they called that ozempic was not approved per pharmacy.     [facility]    1/28/2025  Future Appointments   Date Time Provider Department Center   4/28/2025 10:20 AM Katherine Domingo APRN - CNP [facility]

## 2025-02-11 ENCOUNTER — OFFICE VISIT (OUTPATIENT)
Dept: OBGYN CLINIC | Age: 25
End: 2025-02-11

## 2025-02-11 VITALS
SYSTOLIC BLOOD PRESSURE: 131 MMHG | BODY MASS INDEX: 48.83 KG/M2 | HEART RATE: 114 BPM | DIASTOLIC BLOOD PRESSURE: 73 MMHG | OXYGEN SATURATION: 98 % | WEIGHT: 293 LBS

## 2025-02-11 DIAGNOSIS — Z86.59 HX OF EATING DISORDER: ICD-10-CM

## 2025-02-11 DIAGNOSIS — N92.6 IRREGULAR PERIODS: ICD-10-CM

## 2025-02-11 DIAGNOSIS — N80.9 ENDOMETRIOSIS DETERMINED BY LAPAROSCOPY: ICD-10-CM

## 2025-02-11 DIAGNOSIS — Z09 FOLLOW-UP EXAM: Primary | ICD-10-CM

## 2025-02-11 DIAGNOSIS — N93.9 ABNORMAL UTERINE BLEEDING: ICD-10-CM

## 2025-02-11 DIAGNOSIS — F64.0 GENDER DYSPHORIA IN ADULT: ICD-10-CM

## 2025-02-11 DIAGNOSIS — R10.2 PELVIC PAIN: ICD-10-CM

## 2025-02-11 RX ORDER — PALIPERIDONE 3 MG/1
3 TABLET, EXTENDED RELEASE ORAL NIGHTLY
COMMUNITY
Start: 2025-02-05

## 2025-02-11 NOTE — PROGRESS NOTES
[facility] [place] [facility]   Return Gyn Office Visit    CC:   Chief Complaint   Patient presents with    Other     Multiple periods in one month..  1/14-1/19, 125/-1/30, 2/7-2/11.         HPI:  24 y.o. who presents to [facility] [place] [facility] to discuss irregular periods:   Admits to irregular bleeding prior to OCPs   Patient's last menstrual period was 02/07/2025 (exact date).    States he has had 3 periods in the last month.   Does admit to having left sided pelvic / lower abdominal pain -- no consistent time frame, nothing makes it better / worse, \"toughs it out\"   No correlation to bleeding, only has normal period cramps   Taking birth control daily, has switched timing but did it slowly   Denies SA or new SP  Denies vaginal symptoms like itching.   Admits to hx of bad eating disorder } binging / purging } no longer doing that, now gaining weight -- PCP started Ozempic   Patient's last menstrual period was 02/07/2025 (exact date).   Denies HA / vision changes / blood clots.   Denies any sexual activity, counseling offered.     Review of Systems - The following ROS was otherwise negative, except as noted in the HPI: constitutional, respiratory, cardiovascular, gastrointestinal, genitourinary    Objective:  Vitals:    02/11/25 1113   BP: 131/73   Pulse: (!) 114   SpO2: 98%     General: Alert, well appearing, no acute distress   Resp effort within normal limits   Abdomen: Soft, nontender, nondistended   Pelvic: Deferred   Skin: No visible lesions / rashes / concerning nevus   Extremities: No redness or tenderness, neg Pamella's sign  Osteopathic: no TART changes    Assessment/Plan   Diagnosis Orders   1. Follow-up exam        2. Abnormal uterine bleeding  US PELVIS COMPLETE      3. Endometriosis determined by laparoscopy        4. Irregular periods        5. Gender dysphoria in adult          - await US results   - period diary   - discussed how recent changes can affect ones cycle    - recommend staying on meds for now and

## 2025-02-16 PROBLEM — N80.9 ENDOMETRIOSIS DETERMINED BY LAPAROSCOPY: Status: ACTIVE | Noted: 2025-02-16

## 2025-02-16 PROBLEM — Z86.59 HX OF EATING DISORDER: Status: ACTIVE | Noted: 2025-02-16

## 2025-02-16 PROBLEM — N93.9 ABNORMAL UTERINE BLEEDING: Status: ACTIVE | Noted: 2025-02-16

## 2025-02-16 PROBLEM — N92.6 IRREGULAR PERIODS: Status: ACTIVE | Noted: 2025-02-16

## 2025-02-16 PROBLEM — F64.0 GENDER DYSPHORIA IN ADULT: Status: ACTIVE | Noted: 2025-02-16

## 2025-02-16 PROBLEM — R10.2 PELVIC PAIN: Status: ACTIVE | Noted: 2025-02-16

## 2025-02-20 ENCOUNTER — TELEPHONE (OUTPATIENT)
Dept: FAMILY MEDICINE CLINIC | Age: 25
End: 2025-02-20

## 2025-02-20 NOTE — TELEPHONE ENCOUNTER
Patient just finished yesterday 3 weeks of ozempic. Patient needs to know what dosage she is to take next Wednesday.

## 2025-03-06 RX ORDER — OLANZAPINE 5 MG/1
5 TABLET ORAL NIGHTLY
Qty: 30 TABLET | Refills: 3 | OUTPATIENT
Start: 2025-03-06

## 2025-03-13 ENCOUNTER — TELEPHONE (OUTPATIENT)
Dept: FAMILY MEDICINE CLINIC | Age: 25
End: 2025-03-13

## 2025-03-13 DIAGNOSIS — E11.9 TYPE 2 DIABETES MELLITUS WITHOUT COMPLICATION, WITHOUT LONG-TERM CURRENT USE OF INSULIN (HCC): ICD-10-CM

## 2025-03-13 RX ORDER — AVOBENZONE, HOMOSALATE, OCTISALATE, OCTOCRYLENE 30; 40; 45; 26 MG/ML; MG/ML; MG/ML; MG/ML
1 CREAM TOPICAL DAILY
Qty: 25 EACH | Refills: 5 | Status: SHIPPED | OUTPATIENT
Start: 2025-03-13

## 2025-03-13 RX ORDER — BLOOD-GLUCOSE METER
KIT MISCELLANEOUS
Qty: 1 KIT | Refills: 0 | Status: SHIPPED | OUTPATIENT
Start: 2025-03-13

## 2025-03-13 RX ORDER — GLUCOSAMINE HCL/CHONDROITIN SU 500-400 MG
CAPSULE ORAL
Qty: 25 STRIP | Refills: 5 | Status: SHIPPED | OUTPATIENT
Start: 2025-03-13

## 2025-03-13 NOTE — TELEPHONE ENCOUNTER
Patient is calling because his insurance wont pay for the ozempic because libby didn't update the dosage to 0.5 MG once a week after the 4 weeks of .25. Please update and send to pharmacy         He is also requesting a blood sugar checking kit to monitor blood sugar at home

## 2025-03-14 ENCOUNTER — TELEPHONE (OUTPATIENT)
Dept: FAMILY MEDICINE CLINIC | Age: 25
End: 2025-03-14

## 2025-03-17 NOTE — TELEPHONE ENCOUNTER
Submitted PA for Ozempic (0.25 or 0.5 MG/DOSE) 2MG/3ML pen-injectors  Via CMM Key: [id number] STATUS: Prior Authorization Not Required     Please notify patient. Thank you.

## 2025-03-24 ENCOUNTER — OFFICE VISIT (OUTPATIENT)
Dept: OBGYN CLINIC | Age: 25
End: 2025-03-24
Payer: MEDICARE

## 2025-03-24 VITALS
HEART RATE: 101 BPM | DIASTOLIC BLOOD PRESSURE: 72 MMHG | BODY MASS INDEX: 48.05 KG/M2 | SYSTOLIC BLOOD PRESSURE: 128 MMHG | WEIGHT: 293 LBS

## 2025-03-24 DIAGNOSIS — R68.89 OTHER GENERAL SYMPTOMS AND SIGNS: ICD-10-CM

## 2025-03-24 DIAGNOSIS — N93.9 ABNORMAL UTERINE BLEEDING (AUB): Primary | ICD-10-CM

## 2025-03-24 DIAGNOSIS — F64.0 GENDER DYSPHORIA IN ADULT: ICD-10-CM

## 2025-03-24 DIAGNOSIS — Z86.59 HX OF EATING DISORDER: ICD-10-CM

## 2025-03-24 DIAGNOSIS — F25.0 SCHIZOAFFECTIVE DISORDER, BIPOLAR TYPE (HCC): ICD-10-CM

## 2025-03-24 DIAGNOSIS — N92.6 IRREGULAR PERIODS: ICD-10-CM

## 2025-03-24 DIAGNOSIS — N80.9 ENDOMETRIOSIS DETERMINED BY LAPAROSCOPY: ICD-10-CM

## 2025-03-24 PROCEDURE — 99214 OFFICE O/P EST MOD 30 MIN: CPT | Performed by: OBSTETRICS & GYNECOLOGY

## 2025-03-24 PROCEDURE — 1036F TOBACCO NON-USER: CPT | Performed by: OBSTETRICS & GYNECOLOGY

## 2025-03-24 PROCEDURE — 36415 COLL VENOUS BLD VENIPUNCTURE: CPT | Performed by: OBSTETRICS & GYNECOLOGY

## 2025-03-24 PROCEDURE — G8417 CALC BMI ABV UP PARAM F/U: HCPCS | Performed by: OBSTETRICS & GYNECOLOGY

## 2025-03-24 PROCEDURE — G8427 DOCREV CUR MEDS BY ELIG CLIN: HCPCS | Performed by: OBSTETRICS & GYNECOLOGY
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
## 2025-03-25 LAB
BV BACTERIA DNA VAG QL NAA+PROBE: DETECTED
C GLABRATA DNA VAG QL NAA+PROBE: ABNORMAL
C GLABRATA DNA VAG QL NAA+PROBE: NOT DETECTED
C KRUSEI DNA VAG QL NAA+PROBE: NOT DETECTED
C TRACH DNA CVX QL NAA+PROBE: NEGATIVE
CANDIDA DNA VAG QL NAA+PROBE: NOT DETECTED
ESTRADIOL SERPL-MCNC: 13 PG/ML
FSH SERPL-ACNC: 0.8 MIU/ML
LH SERPL-ACNC: 0.3 MIU/ML
N GONORRHOEA DNA CERV MUCUS QL NAA+PROBE: NEGATIVE
T VAGINALIS DNA VAG QL NAA+PROBE: NOT DETECTED
TSH SERPL DL<=0.005 MIU/L-ACNC: 1.99 UIU/ML (ref 0.27–4.2)

## 2025-03-26 ENCOUNTER — RESULTS FOLLOW-UP (OUTPATIENT)
Dept: OBGYN CLINIC | Age: 25
End: 2025-03-26

## 2025-03-26 LAB
DHEA-S SERPL-MCNC: 161 UG/DL (ref 148–407)
SHBG SERPL-SCNC: 221 NMOL/L (ref 25–122)
TESTOST FREE SERPL-MCNC: 1.5 PG/ML (ref 0.8–7.4)
TESTOST SERPL-MCNC: 37 NG/DL (ref 8–48)

## 2025-04-28 ENCOUNTER — OFFICE VISIT (OUTPATIENT)
Dept: FAMILY MEDICINE CLINIC | Age: 25
End: 2025-04-28
Payer: MEDICARE

## 2025-04-28 VITALS
RESPIRATION RATE: 16 BRPM | HEIGHT: 67 IN | TEMPERATURE: 97.7 F | BODY MASS INDEX: 45.99 KG/M2 | SYSTOLIC BLOOD PRESSURE: 121 MMHG | DIASTOLIC BLOOD PRESSURE: 87 MMHG | OXYGEN SATURATION: 99 % | WEIGHT: 293 LBS | HEART RATE: 86 BPM

## 2025-04-28 DIAGNOSIS — E11.9 TYPE 2 DIABETES MELLITUS WITHOUT COMPLICATION, WITHOUT LONG-TERM CURRENT USE OF INSULIN (HCC): ICD-10-CM

## 2025-04-28 DIAGNOSIS — Z00.00 WELCOME TO MEDICARE PREVENTIVE VISIT: Primary | ICD-10-CM

## 2025-04-28 DIAGNOSIS — E66.813 CLASS 3 SEVERE OBESITY WITH SERIOUS COMORBIDITY AND BODY MASS INDEX (BMI) OF 45.0 TO 49.9 IN ADULT, UNSPECIFIED OBESITY TYPE (HCC): ICD-10-CM

## 2025-04-28 LAB
ALBUMIN SERPL-MCNC: 4 G/DL (ref 3.4–5)
ALBUMIN/GLOB SERPL: 1.8 {RATIO} (ref 1.1–2.2)
ALP SERPL-CCNC: 79 U/L (ref 40–129)
ALT SERPL-CCNC: 127 U/L (ref 10–40)
ANION GAP SERPL CALCULATED.3IONS-SCNC: 12 MMOL/L (ref 3–16)
AST SERPL-CCNC: 128 U/L (ref 15–37)
BASOPHILS # BLD: 0.1 K/UL (ref 0–0.2)
BASOPHILS NFR BLD: 1 %
BILIRUB SERPL-MCNC: <0.2 MG/DL (ref 0–1)
BUN SERPL-MCNC: 7 MG/DL (ref 7–20)
CALCIUM SERPL-MCNC: 9 MG/DL (ref 8.3–10.6)
CHLORIDE SERPL-SCNC: 101 MMOL/L (ref 99–110)
CHOLEST SERPL-MCNC: 146 MG/DL (ref 0–199)
CO2 SERPL-SCNC: 23 MMOL/L (ref 21–32)
CREAT SERPL-MCNC: 0.6 MG/DL (ref 0.6–1.1)
DEPRECATED RDW RBC AUTO: 14 % (ref 12.4–15.4)
EOSINOPHIL # BLD: 0.3 K/UL (ref 0–0.6)
EOSINOPHIL NFR BLD: 5.4 %
GFR SERPLBLD CREATININE-BSD FMLA CKD-EPI: >90 ML/MIN/{1.73_M2}
GLUCOSE SERPL-MCNC: 135 MG/DL (ref 70–99)
HBA1C MFR BLD: 7.3 %
HCT VFR BLD AUTO: 36.5 % (ref 36–48)
HDLC SERPL-MCNC: 30 MG/DL (ref 40–60)
HGB BLD-MCNC: 11.9 G/DL (ref 12–16)
LDL CHOLESTEROL: 73 MG/DL
LYMPHOCYTES # BLD: 1.7 K/UL (ref 1–5.1)
LYMPHOCYTES NFR BLD: 29.1 %
MCH RBC QN AUTO: 27.1 PG (ref 26–34)
MCHC RBC AUTO-ENTMCNC: 32.5 G/DL (ref 31–36)
MCV RBC AUTO: 83.2 FL (ref 80–100)
MONOCYTES # BLD: 0.4 K/UL (ref 0–1.3)
MONOCYTES NFR BLD: 7.3 %
NEUTROPHILS # BLD: 3.4 K/UL (ref 1.7–7.7)
NEUTROPHILS NFR BLD: 57.2 %
PLATELET # BLD AUTO: 318 K/UL (ref 135–450)
PMV BLD AUTO: 8.3 FL (ref 5–10.5)
POTASSIUM SERPL-SCNC: 4.5 MMOL/L (ref 3.5–5.1)
PROT SERPL-MCNC: 6.2 G/DL (ref 6.4–8.2)
RBC # BLD AUTO: 4.38 M/UL (ref 4–5.2)
SODIUM SERPL-SCNC: 136 MMOL/L (ref 136–145)
TRIGL SERPL-MCNC: 214 MG/DL (ref 0–150)
VLDLC SERPL CALC-MCNC: 43 MG/DL
WBC # BLD AUTO: 5.9 K/UL (ref 4–11)

## 2025-04-28 PROCEDURE — 83036 HEMOGLOBIN GLYCOSYLATED A1C: CPT | Performed by: NURSE PRACTITIONER

## 2025-04-28 PROCEDURE — 3051F HG A1C>EQUAL 7.0%<8.0%: CPT | Performed by: NURSE PRACTITIONER

## 2025-04-28 PROCEDURE — G0402 INITIAL PREVENTIVE EXAM: HCPCS | Performed by: NURSE PRACTITIONER

## 2025-04-28 SDOH — ECONOMIC STABILITY: FOOD INSECURITY: WITHIN THE PAST 12 MONTHS, THE FOOD YOU BOUGHT JUST DIDN'T LAST AND YOU DIDN'T HAVE MONEY TO GET MORE.: NEVER TRUE

## 2025-04-28 SDOH — ECONOMIC STABILITY: FOOD INSECURITY: WITHIN THE PAST 12 MONTHS, YOU WORRIED THAT YOUR FOOD WOULD RUN OUT BEFORE YOU GOT MONEY TO BUY MORE.: NEVER TRUE

## 2025-04-28 ASSESSMENT — PATIENT HEALTH QUESTIONNAIRE - PHQ9
8. MOVING OR SPEAKING SO SLOWLY THAT OTHER PEOPLE COULD HAVE NOTICED. OR THE OPPOSITE, BEING SO FIGETY OR RESTLESS THAT YOU HAVE BEEN MOVING AROUND A LOT MORE THAN USUAL: NOT AT ALL
DEPRESSION UNABLE TO ASSESS: FUNCTIONAL CAPACITY MOTIVATION LIMITS ACCURACY
SUM OF ALL RESPONSES TO PHQ QUESTIONS 1-9: 0
3. TROUBLE FALLING OR STAYING ASLEEP: NOT AT ALL
2. FEELING DOWN, DEPRESSED OR HOPELESS: NOT AT ALL
9. THOUGHTS THAT YOU WOULD BE BETTER OFF DEAD, OR OF HURTING YOURSELF: NOT AT ALL
6. FEELING BAD ABOUT YOURSELF - OR THAT YOU ARE A FAILURE OR HAVE LET YOURSELF OR YOUR FAMILY DOWN: NOT AT ALL
1. LITTLE INTEREST OR PLEASURE IN DOING THINGS: NOT AT ALL
5. POOR APPETITE OR OVEREATING: NOT AT ALL
4. FEELING TIRED OR HAVING LITTLE ENERGY: NOT AT ALL
7. TROUBLE CONCENTRATING ON THINGS, SUCH AS READING THE NEWSPAPER OR WATCHING TELEVISION: NOT AT ALL
SUM OF ALL RESPONSES TO PHQ QUESTIONS 1-9: 0
10. IF YOU CHECKED OFF ANY PROBLEMS, HOW DIFFICULT HAVE THESE PROBLEMS MADE IT FOR YOU TO DO YOUR WORK, TAKE CARE OF THINGS AT HOME, OR GET ALONG WITH OTHER PEOPLE: NOT DIFFICULT AT ALL

## 2025-04-28 ASSESSMENT — ANXIETY QUESTIONNAIRES
4. TROUBLE RELAXING: NOT AT ALL
7. FEELING AFRAID AS IF SOMETHING AWFUL MIGHT HAPPEN: NOT AT ALL
2. NOT BEING ABLE TO STOP OR CONTROL WORRYING: NOT AT ALL
IF YOU CHECKED OFF ANY PROBLEMS ON THIS QUESTIONNAIRE, HOW DIFFICULT HAVE THESE PROBLEMS MADE IT FOR YOU TO DO YOUR WORK, TAKE CARE OF THINGS AT HOME, OR GET ALONG WITH OTHER PEOPLE: NOT DIFFICULT AT ALL
6. BECOMING EASILY ANNOYED OR IRRITABLE: NOT AT ALL
5. BEING SO RESTLESS THAT IT IS HARD TO SIT STILL: NOT AT ALL
3. WORRYING TOO MUCH ABOUT DIFFERENT THINGS: NOT AT ALL
1. FEELING NERVOUS, ANXIOUS, OR ON EDGE: NOT AT ALL
GAD7 TOTAL SCORE: 0

## 2025-04-28 NOTE — PROGRESS NOTES
2025     Nirmala Piedra (:  2000) is a 24 y.o. adult, here for evaluation of the following medical concerns:    HPI  {Visit Chronic CHP (Optional):29744}    Review of Systems    Prior to Visit Medications    Medication Sig Taking? Authorizing Provider   Semaglutide,0.25 or 0.5MG/DOS, 2 MG/3ML SOPN Inject 0.5 mg into the skin every 7 days  Katherine Domingo APRN - CNP   blood glucose monitor strips Test 1 times a day & as needed for symptoms of irregular blood glucose. Dispense sufficient amount for indicated testing frequency plus additional to accommodate PRN testing needs.  Katherine Domingo APRN - CNP   Lancets MISC 1 each by Does not apply route daily  Katherine Domingo APRN - CNP   glucose monitoring kit Glucometer- brand per patient preference  Katherine Domingo APRN - CNP   paliperidone (INVEGA) 3 MG extended release tablet Take 1 tablet by mouth nightly  ProviderHeydi MD   pantoprazole (PROTONIX) 40 MG tablet TAKE 1 TABLET BY MOUTH EVERY MORNING BEFORE BREAKFAST  Katherine Domingo APRN - CNP   OLANZapine (ZYPREXA) 5 MG tablet Take 1 tablet by mouth nightly  Ruthann Jones APRN - CNP   norgestimate-ethinyl estradiol (SPRINTEC 28) 0.25-35 MG-MCG per tablet Take 1 tablet by mouth daily Take medication continuously for 2 months (I.e. skipping placebo week) then normal pack, repeat process for remainder of year  Camilla Blakely DO   FLUoxetine (PROZAC) 20 MG capsule Take 3 capsules by mouth daily  Provider, MD Heydi        Social History     Tobacco Use    Smoking status: Never     Passive exposure: Never    Smokeless tobacco: Never   Substance Use Topics    Alcohol use: Not Currently     Alcohol/week: 28.0 standard drinks of alcohol     Types: 28 Shots of liquor per week        There were no vitals filed for this visit.  Estimated body mass index is 48.05 kg/m² as calculated from the following:    Height as of 11/10/24: 1.702 m (5' 7\").

## 2025-04-28 NOTE — PATIENT INSTRUCTIONS
[employer].   Care instructions adapted under license by [employer]. If you have questions about a medical condition or this instruction, always ask your healthcare professional. [facility] [employer], disclaims any warranty or liability for your use of this information.         Starting a Weight-Loss Plan: Care Instructions  Overview    It can be a challenge to lose weight. But your doctor can help you make a weight-loss plan that meets your needs.  You don't have to make a lot of big changes at once. A better idea might be to focus on small changes and stick with them. When those changes become habit, you can add a few more changes.  Some people find it helpful to take an exercise or nutrition class. If you have questions, ask your doctor about seeing a registered dietitian or an exercise specialist. You might also think about joining a weight-loss support group.  If you're not ready to make changes right now, try to pick a date in the future. Then make an appointment with your doctor to talk about when and how you'll get started with a plan.  Follow-up care is a key part of your treatment and safety. Be sure to make and go to all appointments, and call your doctor if you are having problems. It's also a good idea to know your test results and keep a list of the medicines you take.  How can you care for yourself as you start a weight-loss plan?  Set realistic goals. Many people expect to lose much more weight than is likely. A weight loss of 5% to 10% of your body weight may be enough to improve your health.  Get family and friends involved to provide support. Talk to them about why you are trying to lose weight, and ask them to help. They can help by participating in exercise and having meals with you, even if they may be eating something different.  Find what works best for you. If you do not have time or do not like to cook, a program that offers meal replacement bars or shakes may be better for you. Or

## 2025-04-28 NOTE — PROGRESS NOTES
Medicare Annual Wellness Visit    Nirmala Piedra is here for Medicare AWV and Diabetes    Assessment & Plan   Welcome to Medicare preventive visit  Type 2 diabetes mellitus without complication, without long-term current use of insulin (HCC)  -     POCT glycosylated hemoglobin (Hb A1C)  -      DIABETES FOOT EXAM  -     Albumin/Creatinine Ratio, Urine; Future  -     Lipid, Fasting; Future  -     Comprehensive Metabolic Panel; Future  -     CBC with Auto Differential; Future  -     Semaglutide, 1 MG/DOSE, (OZEMPIC) 4 MG/3ML SOPN sc injection; Inject 1 mg into the skin every 7 days, Disp-9 mL, R-1Normal  Class 3 severe obesity with serious comorbidity and body mass index (BMI) of 45.0 to 49.9 in adult, unspecified obesity type (HCC)    DM remains uncontrolled, A1C is 7.3 today. Pt just recently started working on healthy diet and exercise. We will increase Ozempic to 1 mg weekly. Continue to work on lifestyle. Start daily glucose monitoring every morning while fasting, log information and bring to next appointment     Return in about 3 months (around 7/28/2025).     Subjective   The following acute and/or chronic problems were also addressed today:    Here for AWV and follow up on DM    DM II:  - started Ozempic three months ago, current dose 0.5 mg weekly- tolerating. Mild nausea from time to time but not significant pt reports  - last week going better with diet, eating more whole foods  - down to one pepsi/day (20 oz) was drinking 60 oz/day previously  - Walking 20 minutes/day or 1 mile  - complications: none  - previous treatment: Metformin (was on 2000 mg daily)  - denies recent binging/purging     Following with psychiatry GCB on Zyprexa and Invega and Prozac.  Still following with her specialist more stress lately         Patient's complete Health Risk Assessment and screening values have been reviewed and are found in Flowsheets. The following problems were reviewed today and where indicated follow up

## 2025-04-30 ENCOUNTER — RESULTS FOLLOW-UP (OUTPATIENT)
Dept: FAMILY MEDICINE CLINIC | Age: 25
End: 2025-04-30

## 2025-04-30 DIAGNOSIS — R74.8 ELEVATED LIVER ENZYMES: Primary | ICD-10-CM

## 2025-05-28 ENCOUNTER — OFFICE VISIT (OUTPATIENT)
Dept: FAMILY MEDICINE CLINIC | Age: 25
End: 2025-05-28
Payer: MEDICARE

## 2025-05-28 VITALS
HEART RATE: 93 BPM | RESPIRATION RATE: 16 BRPM | BODY MASS INDEX: 46.7 KG/M2 | SYSTOLIC BLOOD PRESSURE: 128 MMHG | WEIGHT: 293 LBS | OXYGEN SATURATION: 98 % | DIASTOLIC BLOOD PRESSURE: 82 MMHG | TEMPERATURE: 97.7 F

## 2025-05-28 DIAGNOSIS — R11.2 NAUSEA AND VOMITING, UNSPECIFIED VOMITING TYPE: ICD-10-CM

## 2025-05-28 DIAGNOSIS — R10.13 EPIGASTRIC PAIN: Primary | ICD-10-CM

## 2025-05-28 PROCEDURE — 1036F TOBACCO NON-USER: CPT | Performed by: NURSE PRACTITIONER

## 2025-05-28 PROCEDURE — G8417 CALC BMI ABV UP PARAM F/U: HCPCS | Performed by: NURSE PRACTITIONER

## 2025-05-28 PROCEDURE — 99214 OFFICE O/P EST MOD 30 MIN: CPT | Performed by: NURSE PRACTITIONER

## 2025-05-28 PROCEDURE — G8427 DOCREV CUR MEDS BY ELIG CLIN: HCPCS | Performed by: NURSE PRACTITIONER

## 2025-05-28 RX ORDER — PALIPERIDONE 1.5 MG/1
1.5 TABLET, EXTENDED RELEASE ORAL EVERY MORNING
COMMUNITY
Start: 2025-04-30

## 2025-05-28 NOTE — PATIENT INSTRUCTIONS
Hold your dose of Ozempic today, stay off of it at least one week.  This medication could be causing your symptoms since it started a few weeks after we increased the dose  Schedule gallbladder ultrasound since you are having epigastric pain and nausea and vomiting after eating  Adhere to very bland diet- no dairy, avoid greasy/spicy/fatty foods  Update on symptoms in 1-2 weeks  Call with any new or worsening of symptoms

## 2025-05-28 NOTE — PROGRESS NOTES
2025     Chief Complaint   Patient presents with    Nausea & Vomiting     Went to the ER [facility]     Abdominal Pain     All over abdomen x 2 weeks       Nirmala Piedra (:  2000) is a 24 y.o. adult, here for evaluation of the following medical concerns:    HPI    Abdominal pain for the last few weeks  Migratory abdominal pain  Associated with post prandial nausea and vomiting intermittently  Has been able to keep some fluid and food   Pain mostly in the epigastric region at this time  Went to ED on 25 because of pain was worsening in the RLQ and pt was worried it was appendix  BM- some diarrhea and some formed stool  No bloody stool   Sometimes eats foods without any symptoms at all  Denies fever   Denies urinary symptoms       DM: on Ozempic, check glucose a few days per week. Last time she checked was 3 days ago and it was 130 after eating. Does of Ozempic was recently increased to 1 mg         Review of Systems   Constitutional:  Negative for fatigue and fever.   Gastrointestinal:  Positive for abdominal pain, diarrhea, nausea and vomiting. Negative for abdominal distention, anal bleeding, blood in stool, constipation and rectal pain.       Prior to Visit Medications    Medication Sig Taking? Authorizing Provider   paliperidone (INVEGA) 1.5 MG extended release tablet Take 1 tablet by mouth every morning Yes Provider, MD Heydi   Semaglutide, 1 MG/DOSE, (OZEMPIC) 4 MG/3ML SOPN sc injection Inject 1 mg into the skin every 7 days Yes Katherine Domingo APRN - CNP   blood glucose monitor strips Test 1 times a day & as needed for symptoms of irregular blood glucose. Dispense sufficient amount for indicated testing frequency plus additional to accommodate PRN testing needs. Yes Katherine Domingo APRN - CNP   Lancets MISC 1 each by Does not apply route daily Yes Katherine Domingo APRN - CNP   glucose monitoring kit Glucometer- brand per patient preference Yes

## 2025-05-29 ASSESSMENT — ENCOUNTER SYMPTOMS
NAUSEA: 1
ANAL BLEEDING: 0
BLOOD IN STOOL: 0
ABDOMINAL PAIN: 1
VOMITING: 1
RECTAL PAIN: 0
CONSTIPATION: 0
DIARRHEA: 1
ABDOMINAL DISTENTION: 0

## 2025-06-05 ENCOUNTER — HOSPITAL ENCOUNTER (OUTPATIENT)
Dept: ULTRASOUND IMAGING | Age: 25
Discharge: HOME | End: 2025-06-05
Payer: MEDICARE

## 2025-06-05 ENCOUNTER — RESULTS FOLLOW-UP (OUTPATIENT)
Dept: FAMILY MEDICINE CLINIC | Age: 25
End: 2025-06-05

## 2025-06-05 DIAGNOSIS — R74.8 ELEVATED LIVER ENZYMES: ICD-10-CM

## 2025-06-05 DIAGNOSIS — R10.13 EPIGASTRIC PAIN: ICD-10-CM

## 2025-06-05 DIAGNOSIS — R10.13 EPIGASTRIC PAIN: Primary | ICD-10-CM

## 2025-06-05 DIAGNOSIS — R11.2 NAUSEA AND VOMITING, UNSPECIFIED VOMITING TYPE: ICD-10-CM

## 2025-06-05 PROCEDURE — 76705 ECHO EXAM OF ABDOMEN: CPT

## 2025-06-20 ENCOUNTER — TRANSCRIBE ORDERS (OUTPATIENT)
Dept: ADMINISTRATIVE | Age: 25
End: 2025-06-20

## 2025-06-20 DIAGNOSIS — R11.2 NAUSEA AND VOMITING IN ADULT: Primary | ICD-10-CM

## 2025-06-30 DIAGNOSIS — N80.9 ENDOMETRIOSIS DETERMINED BY LAPAROSCOPY: ICD-10-CM

## 2025-07-01 RX ORDER — NORGESTIMATE AND ETHINYL ESTRADIOL 0.25-0.035
KIT ORAL
Qty: 84 TABLET | OUTPATIENT
Start: 2025-07-01

## 2025-07-12 ENCOUNTER — HOSPITAL ENCOUNTER (EMERGENCY)
Age: 25
Discharge: HOME | End: 2025-07-12
Payer: MEDICARE

## 2025-07-12 VITALS
WEIGHT: 293 LBS | HEIGHT: 67 IN | TEMPERATURE: 98.1 F | HEART RATE: 109 BPM | BODY MASS INDEX: 45.99 KG/M2 | RESPIRATION RATE: 20 BRPM | DIASTOLIC BLOOD PRESSURE: 96 MMHG | OXYGEN SATURATION: 99 % | SYSTOLIC BLOOD PRESSURE: 156 MMHG

## 2025-07-12 DIAGNOSIS — Z72.89 DELIBERATE SELF-CUTTING: ICD-10-CM

## 2025-07-12 DIAGNOSIS — S41.111A LACERATION OF RIGHT UPPER EXTREMITY, INITIAL ENCOUNTER: Primary | ICD-10-CM

## 2025-07-12 LAB
ALBUMIN SERPL-MCNC: 4.2 G/DL (ref 3.4–5)
ALBUMIN/GLOB SERPL: 1.6 {RATIO} (ref 1.1–2.2)
ALP SERPL-CCNC: 83 U/L (ref 40–129)
ALT SERPL-CCNC: 36 U/L (ref 10–40)
AMPHETAMINES UR QL SCN>1000 NG/ML: NORMAL
ANION GAP SERPL CALCULATED.3IONS-SCNC: 16 MMOL/L (ref 3–16)
ANION GAP SERPL CALCULATED.3IONS-SCNC: 18 MMOL/L (ref 3–16)
AST SERPL-CCNC: 42 U/L (ref 15–37)
BARBITURATES UR QL SCN>200 NG/ML: NORMAL
BASE EXCESS BLDV CALC-SCNC: -4.3 MMOL/L (ref -3–3)
BASOPHILS # BLD: 0.1 K/UL (ref 0–0.2)
BASOPHILS NFR BLD: 1.2 %
BENZODIAZ UR QL SCN>200 NG/ML: NORMAL
BILIRUB SERPL-MCNC: <0.2 MG/DL (ref 0–1)
BILIRUB UR QL STRIP.AUTO: NEGATIVE
BUN SERPL-MCNC: 11 MG/DL (ref 7–20)
BUN SERPL-MCNC: 11 MG/DL (ref 7–20)
CALCIUM SERPL-MCNC: 8.9 MG/DL (ref 8.3–10.6)
CALCIUM SERPL-MCNC: 9.3 MG/DL (ref 8.3–10.6)
CANNABINOIDS UR QL SCN>50 NG/ML: NORMAL
CHLORIDE SERPL-SCNC: 101 MMOL/L (ref 99–110)
CHLORIDE SERPL-SCNC: 102 MMOL/L (ref 99–110)
CLARITY UR: CLEAR
CO2 BLDV-SCNC: 21 MMOL/L
CO2 SERPL-SCNC: 15 MMOL/L (ref 21–32)
CO2 SERPL-SCNC: 19 MMOL/L (ref 21–32)
COCAINE UR QL SCN: NORMAL
COHGB MFR BLDV: 1.6 % (ref 0–1.5)
COLOR UR: YELLOW
CREAT SERPL-MCNC: 0.7 MG/DL (ref 0.6–1.1)
CREAT SERPL-MCNC: 0.8 MG/DL (ref 0.6–1.1)
DEPRECATED RDW RBC AUTO: 13.3 % (ref 12.4–15.4)
DRUG SCREEN COMMENT UR-IMP: NORMAL
EOSINOPHIL # BLD: 0.2 K/UL (ref 0–0.6)
EOSINOPHIL NFR BLD: 2 %
ETHANOLAMINE SERPL-MCNC: NORMAL MG/DL (ref 0–0.08)
FENTANYL SCREEN, URINE: NORMAL
GFR SERPLBLD CREATININE-BSD FMLA CKD-EPI: >90 ML/MIN/{1.73_M2}
GFR SERPLBLD CREATININE-BSD FMLA CKD-EPI: >90 ML/MIN/{1.73_M2}
GLUCOSE SERPL-MCNC: 171 MG/DL (ref 70–99)
GLUCOSE SERPL-MCNC: 197 MG/DL (ref 70–99)
GLUCOSE UR STRIP.AUTO-MCNC: NEGATIVE MG/DL
HCG UR QL: NEGATIVE
HCO3 BLDV-SCNC: 20.1 MMOL/L (ref 23–29)
HCT VFR BLD AUTO: 39.3 % (ref 36–48)
HGB BLD-MCNC: 12.9 G/DL (ref 12–16)
HGB UR QL STRIP.AUTO: NEGATIVE
KETONES UR STRIP.AUTO-MCNC: 15 MG/DL
LEUKOCYTE ESTERASE UR QL STRIP.AUTO: NEGATIVE
LYMPHOCYTES # BLD: 2.1 K/UL (ref 1–5.1)
LYMPHOCYTES NFR BLD: 24.8 %
MCH RBC QN AUTO: 27.8 PG (ref 26–34)
MCHC RBC AUTO-ENTMCNC: 32.8 G/DL (ref 31–36)
MCV RBC AUTO: 84.7 FL (ref 80–100)
METHADONE UR QL SCN>300 NG/ML: NORMAL
METHGB MFR BLDV: 0.2 %
MONOCYTES # BLD: 0.5 K/UL (ref 0–1.3)
MONOCYTES NFR BLD: 5.9 %
NEUTROPHILS # BLD: 5.6 K/UL (ref 1.7–7.7)
NEUTROPHILS NFR BLD: 66.1 %
NITRITE UR QL STRIP.AUTO: NEGATIVE
O2 THERAPY: ABNORMAL
OPIATES UR QL SCN>300 NG/ML: NORMAL
OXYCODONE UR QL SCN: NORMAL
PCO2 BLDV: 35 MMHG (ref 40–50)
PCP UR QL SCN>25 NG/ML: NORMAL
PH BLDV: 7.38 [PH] (ref 7.35–7.45)
PH UR STRIP.AUTO: 6 [PH] (ref 5–8)
PH UR STRIP: 7 [PH]
PLATELET # BLD AUTO: 336 K/UL (ref 135–450)
PMV BLD AUTO: 7.2 FL (ref 5–10.5)
PO2 BLDV: 71.1 MMHG (ref 25–40)
POTASSIUM SERPL-SCNC: 4 MMOL/L (ref 3.5–5.1)
POTASSIUM SERPL-SCNC: 4.2 MMOL/L (ref 3.5–5.1)
PROT SERPL-MCNC: 6.8 G/DL (ref 6.4–8.2)
PROT UR STRIP.AUTO-MCNC: NEGATIVE MG/DL
RBC # BLD AUTO: 4.64 M/UL (ref 4–5.2)
SAO2 % BLDV: 94 %
SODIUM SERPL-SCNC: 135 MMOL/L (ref 136–145)
SODIUM SERPL-SCNC: 136 MMOL/L (ref 136–145)
SP GR UR STRIP.AUTO: >=1.03 (ref 1–1.03)
UA COMPLETE W REFLEX CULTURE PNL UR: ABNORMAL
UA DIPSTICK W REFLEX MICRO PNL UR: ABNORMAL
URN SPEC COLLECT METH UR: ABNORMAL
UROBILINOGEN UR STRIP-ACNC: 0.2 E.U./DL
WBC # BLD AUTO: 8.4 K/UL (ref 4–11)

## 2025-07-12 PROCEDURE — 99284 EMERGENCY DEPT VISIT MOD MDM: CPT

## 2025-07-12 PROCEDURE — 96361 HYDRATE IV INFUSION ADD-ON: CPT

## 2025-07-12 PROCEDURE — 81003 URINALYSIS AUTO W/O SCOPE: CPT

## 2025-07-12 PROCEDURE — 12002 RPR S/N/AX/GEN/TRNK2.6-7.5CM: CPT

## 2025-07-12 PROCEDURE — 85025 COMPLETE CBC W/AUTO DIFF WBC: CPT

## 2025-07-12 PROCEDURE — 80053 COMPREHEN METABOLIC PANEL: CPT

## 2025-07-12 PROCEDURE — 82077 ASSAY SPEC XCP UR&BREATH IA: CPT

## 2025-07-12 PROCEDURE — 93005 ELECTROCARDIOGRAM TRACING: CPT

## 2025-07-12 PROCEDURE — 96360 HYDRATION IV INFUSION INIT: CPT

## 2025-07-12 PROCEDURE — 84703 CHORIONIC GONADOTROPIN ASSAY: CPT

## 2025-07-12 PROCEDURE — 82803 BLOOD GASES ANY COMBINATION: CPT

## 2025-07-12 PROCEDURE — 36415 COLL VENOUS BLD VENIPUNCTURE: CPT

## 2025-07-12 PROCEDURE — 90792 PSYCH DIAG EVAL W/MED SRVCS: CPT

## 2025-07-12 PROCEDURE — 2580000003 HC RX 258

## 2025-07-12 PROCEDURE — 80307 DRUG TEST PRSMV CHEM ANLYZR: CPT

## 2025-07-12 RX ORDER — SODIUM CHLORIDE, SODIUM LACTATE, POTASSIUM CHLORIDE, AND CALCIUM CHLORIDE .6; .31; .03; .02 G/100ML; G/100ML; G/100ML; G/100ML
1000 INJECTION, SOLUTION INTRAVENOUS ONCE
Status: COMPLETED | OUTPATIENT
Start: 2025-07-12 | End: 2025-07-12

## 2025-07-12 RX ADMIN — SODIUM CHLORIDE, SODIUM LACTATE, POTASSIUM CHLORIDE, AND CALCIUM CHLORIDE 1000 ML: .6; .31; .03; .02 INJECTION, SOLUTION INTRAVENOUS at 19:29

## 2025-07-12 ASSESSMENT — LIFESTYLE VARIABLES
HOW OFTEN DO YOU HAVE A DRINK CONTAINING ALCOHOL: NEVER
HOW MANY STANDARD DRINKS CONTAINING ALCOHOL DO YOU HAVE ON A TYPICAL DAY: PATIENT DOES NOT DRINK

## 2025-07-12 ASSESSMENT — PAIN SCALES - GENERAL: PAINLEVEL_OUTOF10: 0

## 2025-07-12 NOTE — ED NOTES
Patient was placed on suicide precautions following an assessment indicating elevated risk for self-harm. Suicide precautions were initiated per facility protocol. Continuous one-to-one observation began at 6:42 PM , with assigned staff remaining at arm’s length and maintaining constant visual contact with the patient at all times.  A thorough environmental safety check was conducted. All extraneous ligature risks were removed from the patient’s room, including any objects that could be used for self-injury. The patient's clothing was removed and replaced with hospital-approved safety garments to minimize risk. All personal belongings were collected, and placed in a  belongings bag labeled with the patient’s identification. This bag was stored securely outside of the patient’s room.  The patient was informed of the safety procedures and the rationale for suicide precautions.

## 2025-07-12 NOTE — ED NOTES
Pt taken to room 11, constant observer Patty at bedside, everything removed from patients room and pt placed in suicide gown.   Kamilah HO given report on patient.

## 2025-07-12 NOTE — ED NOTES
Pt denies self-harm was d/t suicidal ideation.  States she does this to take pain away.  HX cutting, last happened approx 6 months ago.  Pt diagnosed with schizoaffective, takes zyprexa and prozac, states compliant with doses.  HX of suicidal attempt 1 year ago, states she drove herself to the hospital at that time.   Has not had any suicidal behavior or suicidal intent since this episode.  States she is going through a divorce and feels overwhelmed lately.    Pt is anxious, but compliant during triage.

## 2025-07-12 NOTE — ED PROVIDER NOTES
[facility] EMERGENCY DEPARTMENT  Emergency Department Encounter    Patient Name: Nirmala Piedra  MRN: [id number]  YOB: 2000  Date of Evaluation: 7/12/2025  Provider: Katherine Domingo APRN - CNP  Note Started: 6:08 PM EDT 7/12/25    CHIEF COMPLAINT  Laceration (Laceration to right arm from a knife, pt states they were trying to self harm. /Pt denies any suicidal ideation, or homicidal ideation. /Pt states they were cutting themselves to feel better. )    SHARED SERVICE VISIT  ARACELY. I have evaluated this patient.      HISTORY OF PRESENT ILLNESS  History From: Patient.    Limitations to history : None    Nirmala Piedra is a 24 y.o. adult biological female transitioning to male with history of type 2 diabetes, skin schizoaffective disorder, and PTSD who presents to the ED for evaluation of self-harm onset today prior to arrival.  Patient states that he has been under a lot of stress recently.  States that he is getting a divorce with his wife.  States that he has lost his house as well as his cat.  States that this has been very emotionally overwhelming.  States that today in an attempt to try and feel better he used an X-Acto knife and lacerated his posterior left forearm.  States that he did not mean to cut very deep however did cut relatively deep on one of the lacerations that has continued to bleed.  Patient states that he did not cut himself in an attempt to hurt himself just to try to feel better.  Patient denies any homicidal or suicidal ideations.  States that he is on Zyprexa and Prozac and has been taking these medications as prescribed.  Denies any other complaints.  Tetanus shot up-to-date.    No other complaints, modifying factors or associated symptoms.     Nursing notes reviewed were all reviewed and agreed with or any disagreements were addressed in the HPI.    PMH:  Past Medical History:   Diagnosis Date    Anxiety     Bipolar 1 disorder (HCC)     Borderline personality  156/96  Pulse: (!) 109          REVIEW OF SYSTEMS  Positives and Pertinent Negatives as per HPI.    PHYSICAL EXAM  ED Triage Vitals   BP Systolic BP Percentile Diastolic BP Percentile Temp Temp Source Pulse Respirations SpO2   07/12/25 1808 -- -- 07/12/25 1807 07/12/25 1807 07/12/25 1807 07/12/25 1807 07/12/25 1807   (!) 167/71   98.1 °F (36.7 °C) Oral (!) 114 18 98 %      Height Weight - Scale         07/12/25 1807 07/12/25 1807         1.702 m (5' 7\") 133.6 kg (294 lb 9.6 oz)             Physical Exam  Vitals and nursing note reviewed.   Constitutional:       General: He is not in acute distress.     Appearance: Normal appearance. He is not ill-appearing, toxic-appearing or diaphoretic.   HENT:      Head: Normocephalic and atraumatic.      Nose: Nose normal.      Mouth/Throat:      Mouth: Mucous membranes are moist.   Eyes:      Extraocular Movements: Extraocular movements intact.      Pupils: Pupils are equal, round, and reactive to light.   Cardiovascular:      Rate and Rhythm: Normal rate.   Pulmonary:      Effort: Pulmonary effort is normal. No respiratory distress.      Breath sounds: No stridor. No wheezing, rhonchi or rales.   Abdominal:      General: Abdomen is flat. There is no distension.      Palpations: There is no mass.      Tenderness: There is no abdominal tenderness. There is no guarding or rebound.   Musculoskeletal:         General: Normal range of motion.      Cervical back: Normal range of motion. No tenderness.      Right lower leg: No edema.      Left lower leg: No edema.   Skin:     General: Skin is warm and dry.      Capillary Refill: Capillary refill takes less than 2 seconds.      Comments: Approximately 4 linear lacerations to the posterior aspect of the right forearm running horizontally.  The largest laceration to the proximal aspect is oozing blood.   Neurological:      General: No focal deficit present.      Mental Status: He is alert. Mental status is at baseline.   Psychiatric:

## 2025-07-13 LAB
EKG ATRIAL RATE: 105 BPM
EKG DIAGNOSIS: NORMAL
EKG P AXIS: 26 DEGREES
EKG P-R INTERVAL: 140 MS
EKG Q-T INTERVAL: 386 MS
EKG QRS DURATION: 68 MS
EKG QTC CALCULATION (BAZETT): 510 MS
EKG R AXIS: 29 DEGREES
EKG T AXIS: 55 DEGREES
EKG VENTRICULAR RATE: 105 BPM

## 2025-07-13 NOTE — VIRTUAL HEALTH
Nimrala Piedra  [id number]  2000     EMERGENCY DEPARTMENT TELEPSYCHIATRY EVALUATION    07/12/25    Chief Complaint:  “I self harmed a little bit because it makes me feel better but I went to deep tonight and stopped instantly”  HPI: Patient is a 24 y.o.  adult who presents for psychiatric evaluation. Patient presented to the ED on 07/12/25 from home. Per ED \"Nirmala Piedra is a 24 y.o. adult biological female transitioning to male with history of type 2 diabetes, skin schizoaffective disorder, and PTSD who presents to the ED for evaluation of self-harm onset today prior to arrival.  Patient states that he has been under a lot of stress recently.  States that he is getting a divorce with his wife.  States that he has lost his house as well as his cat.  States that this has been very emotionally overwhelming.  States that today in an attempt to try and feel better he used an X-Acto knife and lacerated his posterior left forearm.  States that he did not mean to cut very deep however did cut relatively deep on one of the lacerations that has continued to bleed.  Patient states that he did not cut himself in an attempt to hurt himself just to try to feel better.  Patient denies any homicidal or suicidal ideations.  States that he is on Zyprexa and Prozac and has been taking these medications as prescribed.  Denies any other complaints.  Tetanus shot up-to-date. \"    Patient presents the emergency department with self-harm.  Patient states that they usually self-harm for stress by recognize some help.  But they do not normally cut this shape.  They state as soon as they cut deep enough to where it bled they stopped realized the mistake and came to the emergency department.  Patient states they are currently going through divorce and this has been stressful for them.  They live with her grandparents right now and work at [employer].  Patient states they have cats and enjoyed being around cats

## 2025-07-13 NOTE — ED PROVIDER NOTES
I independently reviewed the ECG as follows:    Sinus tachycardia rate of 105 without ectopy.  Normal axis.  Prolonged QTc 510 otherwise normal intervals.  No evidence of acute ischemia.  EKG does not show significant changes from February 2, 2024.    Please reference my attending note if I had further involvement in the care of this patient otherwise I did not participate in the care of this patient beyond evaluation of this ECG.  Please reference the ARACELY's documentation for further details.        Jan Harrington MD  07/12/25 [id number]

## 2025-07-13 NOTE — DISCHARGE INSTRUCTIONS
Follow-up in 7 to 10 days for wound reevaluation and suture removal.  Follow-up with your psychiatrist for further evaluation and management.

## 2025-07-14 PROCEDURE — 93010 ELECTROCARDIOGRAM REPORT: CPT | Performed by: INTERNAL MEDICINE

## 2025-07-18 ENCOUNTER — OFFICE VISIT (OUTPATIENT)
Dept: FAMILY MEDICINE CLINIC | Age: 25
End: 2025-07-18

## 2025-07-18 VITALS
RESPIRATION RATE: 16 BRPM | BODY MASS INDEX: 46.05 KG/M2 | SYSTOLIC BLOOD PRESSURE: 132 MMHG | TEMPERATURE: 97.6 F | WEIGHT: 293 LBS | OXYGEN SATURATION: 98 % | DIASTOLIC BLOOD PRESSURE: 72 MMHG | HEART RATE: 104 BPM

## 2025-07-18 DIAGNOSIS — E11.9 TYPE 2 DIABETES MELLITUS WITHOUT COMPLICATION, WITHOUT LONG-TERM CURRENT USE OF INSULIN (HCC): ICD-10-CM

## 2025-07-18 DIAGNOSIS — R35.0 URINARY FREQUENCY: ICD-10-CM

## 2025-07-18 DIAGNOSIS — I49.9 IRREGULAR HEART BEAT: ICD-10-CM

## 2025-07-18 DIAGNOSIS — R74.8 ELEVATED LIVER ENZYMES: ICD-10-CM

## 2025-07-18 DIAGNOSIS — R55 SYNCOPE, UNSPECIFIED SYNCOPE TYPE: Primary | ICD-10-CM

## 2025-07-18 LAB
ALBUMIN SERPL-MCNC: 3.9 G/DL (ref 3.4–5)
ALBUMIN/GLOB SERPL: 1.5 {RATIO} (ref 1.1–2.2)
ALP SERPL-CCNC: 67 U/L (ref 40–129)
ALT SERPL-CCNC: 44 U/L (ref 10–40)
ANION GAP SERPL CALCULATED.3IONS-SCNC: 15 MMOL/L (ref 3–16)
AST SERPL-CCNC: 48 U/L (ref 15–37)
BILIRUB SERPL-MCNC: <0.2 MG/DL (ref 0–1)
BILIRUB UR QL STRIP.AUTO: NEGATIVE
BUN SERPL-MCNC: 11 MG/DL (ref 7–20)
CALCIUM SERPL-MCNC: 9.5 MG/DL (ref 8.3–10.6)
CHLORIDE SERPL-SCNC: 100 MMOL/L (ref 99–110)
CLARITY UR: CLEAR
CO2 SERPL-SCNC: 20 MMOL/L (ref 21–32)
COLOR UR: ABNORMAL
CREAT SERPL-MCNC: 0.6 MG/DL (ref 0.6–1.1)
CREAT UR-MCNC: 158 MG/DL (ref 28–259)
EPI CELLS #/AREA URNS HPF: ABNORMAL /HPF (ref 0–5)
GFR SERPLBLD CREATININE-BSD FMLA CKD-EPI: >90 ML/MIN/{1.73_M2}
GLUCOSE SERPL-MCNC: 156 MG/DL (ref 70–99)
GLUCOSE UR STRIP.AUTO-MCNC: NEGATIVE MG/DL
HGB UR QL STRIP.AUTO: ABNORMAL
KETONES UR STRIP.AUTO-MCNC: NEGATIVE MG/DL
LEUKOCYTE ESTERASE UR QL STRIP.AUTO: ABNORMAL
MICROALBUMIN UR DL<=1MG/L-MCNC: 6.77 MG/DL
MICROALBUMIN/CREAT UR: 42.8 MG/G (ref 0–30)
NITRITE UR QL STRIP.AUTO: NEGATIVE
PH UR STRIP.AUTO: 5.5 [PH] (ref 5–8)
POTASSIUM SERPL-SCNC: 4.4 MMOL/L (ref 3.5–5.1)
PROT SERPL-MCNC: 6.5 G/DL (ref 6.4–8.2)
PROT UR STRIP.AUTO-MCNC: 100 MG/DL
RBC #/AREA URNS HPF: ABNORMAL /HPF (ref 0–4)
SODIUM SERPL-SCNC: 135 MMOL/L (ref 136–145)
SP GR UR STRIP.AUTO: 1.02 (ref 1–1.03)
UA COMPLETE W REFLEX CULTURE PNL UR: ABNORMAL
UA DIPSTICK W REFLEX MICRO PNL UR: YES
URN SPEC COLLECT METH UR: ABNORMAL
UROBILINOGEN UR STRIP-ACNC: 0.2 E.U./DL
WBC #/AREA URNS HPF: ABNORMAL /HPF (ref 0–5)

## 2025-07-18 RX ORDER — GLUCOSAMINE HCL/CHONDROITIN SU 500-400 MG
CAPSULE ORAL
Qty: 50 STRIP | Refills: 0 | Status: SHIPPED | OUTPATIENT
Start: 2025-07-18

## 2025-07-18 RX ORDER — BLOOD-GLUCOSE METER
1 KIT MISCELLANEOUS DAILY
Qty: 1 KIT | Refills: 0 | Status: SHIPPED | OUTPATIENT
Start: 2025-07-18

## 2025-07-18 ASSESSMENT — ENCOUNTER SYMPTOMS
GASTROINTESTINAL NEGATIVE: 1
RESPIRATORY NEGATIVE: 1

## 2025-07-18 NOTE — PROGRESS NOTES
7/18/2025    This is a 24 y.o. adult   Chief Complaint   Patient presents with    Follow-up     137 glucose    .    HPI    History of Present Illness  The patient is seen today for ED follow up. He was seen in the ED for self injury requireing stitches. However two days later he reported that he felt his heart miss 3 beats in a row and he passed out in the shower. He woke up on the floor and does not know how long he was out. He states this has happened in the past. To his best recollection it was three months ago. He has a history of other syncopal episodes. He has not seen cardiology. He did go to the ED for evaluation after the fall but he was unable to stay because his ride needed to leave.  He is also concerned that he has ketones in his urine. This was noted in the 7/12 ED visit and on home test strips. Labs from that day showed dehydration and improved after fluids. He reports to be drinking and eating well. He does have known gastroparesis.      He has been on Ozempic for approximately 6 months but has not monitored his blood sugar levels recently due to the loss of his glucometer. His primary care physician, Dr. Katherine Trevizo, manages his diabetes. He has an upcoming appointment with her on 07/28/2025. He reports feeling unwell, with symptoms including fatigue, frequent urination, and excessive thirst. He has detected ketones in his urine using test strips. His diet primarily consists of grilled chicken and rice. He was previously on metformin, which was effective, but it was discontinued when he started Ozempic. He consumes 5 to 6 bottles of water daily and does not consume coffee or energy drinks.      He has been experiencing a decline in mental health since yesterday, feeling extremely heavy and tired. He is currently taking his medications as prescribed for his mental health. No reported SI and self injury was utilized for symptom management. He has  stitches that were placed 6 days ago. There are no

## 2025-07-21 ENCOUNTER — OFFICE VISIT (OUTPATIENT)
Dept: FAMILY MEDICINE CLINIC | Age: 25
End: 2025-07-21
Payer: MEDICARE

## 2025-07-21 VITALS
RESPIRATION RATE: 16 BRPM | DIASTOLIC BLOOD PRESSURE: 78 MMHG | HEART RATE: 105 BPM | TEMPERATURE: 97.1 F | OXYGEN SATURATION: 98 % | BODY MASS INDEX: 46.05 KG/M2 | SYSTOLIC BLOOD PRESSURE: 113 MMHG | WEIGHT: 293 LBS

## 2025-07-21 DIAGNOSIS — R10.13 EPIGASTRIC PAIN: ICD-10-CM

## 2025-07-21 DIAGNOSIS — Z48.02 VISIT FOR SUTURE REMOVAL: ICD-10-CM

## 2025-07-21 DIAGNOSIS — E11.9 TYPE 2 DIABETES MELLITUS WITHOUT COMPLICATION, WITHOUT LONG-TERM CURRENT USE OF INSULIN (HCC): Primary | ICD-10-CM

## 2025-07-21 PROCEDURE — 2022F DILAT RTA XM EVC RTNOPTHY: CPT | Performed by: NURSE PRACTITIONER

## 2025-07-21 PROCEDURE — 99214 OFFICE O/P EST MOD 30 MIN: CPT | Performed by: NURSE PRACTITIONER

## 2025-07-21 PROCEDURE — G8427 DOCREV CUR MEDS BY ELIG CLIN: HCPCS | Performed by: NURSE PRACTITIONER

## 2025-07-21 PROCEDURE — 1036F TOBACCO NON-USER: CPT | Performed by: NURSE PRACTITIONER

## 2025-07-21 PROCEDURE — 3051F HG A1C>EQUAL 7.0%<8.0%: CPT | Performed by: NURSE PRACTITIONER

## 2025-07-21 PROCEDURE — G8417 CALC BMI ABV UP PARAM F/U: HCPCS | Performed by: NURSE PRACTITIONER

## 2025-07-21 RX ORDER — BLOOD-GLUCOSE METER
1 KIT MISCELLANEOUS DAILY
Qty: 1 KIT | Refills: 0 | Status: SHIPPED | OUTPATIENT
Start: 2025-07-21

## 2025-07-21 RX ORDER — GLIPIZIDE 2.5 MG/1
2.5 TABLET, EXTENDED RELEASE ORAL DAILY
Qty: 30 TABLET | Refills: 3 | Status: SHIPPED | OUTPATIENT
Start: 2025-07-21

## 2025-07-21 SDOH — ECONOMIC STABILITY: FOOD INSECURITY: WITHIN THE PAST 12 MONTHS, THE FOOD YOU BOUGHT JUST DIDN'T LAST AND YOU DIDN'T HAVE MONEY TO GET MORE.: NEVER TRUE

## 2025-07-21 SDOH — ECONOMIC STABILITY: FOOD INSECURITY: WITHIN THE PAST 12 MONTHS, YOU WORRIED THAT YOUR FOOD WOULD RUN OUT BEFORE YOU GOT MONEY TO BUY MORE.: NEVER TRUE

## 2025-07-21 ASSESSMENT — PATIENT HEALTH QUESTIONNAIRE - PHQ9
SUM OF ALL RESPONSES TO PHQ QUESTIONS 1-9: 0
10. IF YOU CHECKED OFF ANY PROBLEMS, HOW DIFFICULT HAVE THESE PROBLEMS MADE IT FOR YOU TO DO YOUR WORK, TAKE CARE OF THINGS AT HOME, OR GET ALONG WITH OTHER PEOPLE: NOT DIFFICULT AT ALL
7. TROUBLE CONCENTRATING ON THINGS, SUCH AS READING THE NEWSPAPER OR WATCHING TELEVISION: NOT AT ALL
3. TROUBLE FALLING OR STAYING ASLEEP: NOT AT ALL
DEPRESSION UNABLE TO ASSESS: FUNCTIONAL CAPACITY MOTIVATION LIMITS ACCURACY
2. FEELING DOWN, DEPRESSED OR HOPELESS: NOT AT ALL
8. MOVING OR SPEAKING SO SLOWLY THAT OTHER PEOPLE COULD HAVE NOTICED. OR THE OPPOSITE, BEING SO FIGETY OR RESTLESS THAT YOU HAVE BEEN MOVING AROUND A LOT MORE THAN USUAL: NOT AT ALL
SUM OF ALL RESPONSES TO PHQ QUESTIONS 1-9: 0
5. POOR APPETITE OR OVEREATING: NOT AT ALL
6. FEELING BAD ABOUT YOURSELF - OR THAT YOU ARE A FAILURE OR HAVE LET YOURSELF OR YOUR FAMILY DOWN: NOT AT ALL
1. LITTLE INTEREST OR PLEASURE IN DOING THINGS: NOT AT ALL
SUM OF ALL RESPONSES TO PHQ QUESTIONS 1-9: 0
4. FEELING TIRED OR HAVING LITTLE ENERGY: NOT AT ALL
SUM OF ALL RESPONSES TO PHQ QUESTIONS 1-9: 0
9. THOUGHTS THAT YOU WOULD BE BETTER OFF DEAD, OR OF HURTING YOURSELF: NOT AT ALL

## 2025-07-21 NOTE — PROGRESS NOTES
2025     Chief Complaint   Patient presents with    Suture / Staple Removal       Nirmala Piedra (:  2000) is a 24 y.o. adult, here for evaluation of the following medical concerns:    HPI  Ross is here today for suture removal for laceration on right forearm. This was due to self cutting. Pt states it had been 4 months since he had self harmed prior to this most recent episode. Today is feeling ok, no SI or SIB today. Long complicated mental health history. . Has appointment with  tomorrow at [facility] to get scheduled with psychiatrist.     DM: uncontrolled  A1C 7.2   Minimal improvement from April  Ozempic 1 mg weekly  Was having issues ith abdominal pain and nausea  Saw GI- pt has not completed gastric emptying study  Pt states GI symptoms have been much better since following gastroparesis diet  Has not been monitoring glucose at home  Lost testing supplies  Glucose 197 in the ED, + ketones in urine with elevated anion gap which normalized with fluids  Pt eating 4-5 small meals/day  Would benefit from dietician evaluation        Review of Systems   Constitutional:  Negative for fatigue and fever.   Respiratory:  Negative for cough and shortness of breath.    Cardiovascular:  Negative for chest pain and leg swelling.   Gastrointestinal:  Negative for diarrhea, nausea and vomiting.        GI symptoms improved with dietary changes   Skin:  Positive for wound.   Neurological:  Negative for dizziness and headaches.       Prior to Visit Medications    Medication Sig Taking? Authorizing Provider   glipiZIDE (GLUCOTROL XL) 2.5 MG extended release tablet Take 1 tablet by mouth daily Yes Katherine Domingo APRN - CNP   glucose monitoring kit 1 kit by Does not apply route daily Yes Katherine Domingo APRN - CNP   blood glucose monitor strips Test once times a day & as needed for symptoms of irregular blood glucose. Dispense sufficient amount for indicated testing frequency plus additional

## 2025-07-21 NOTE — PATIENT INSTRUCTIONS
A1c 7.2  Start glipizide one tablet daily with breakfast  Check glucose fasting ad 2 hours after dinner every day, write this information down and bring it to next appointment  Schedule gastric emptying study GI ordered  Keep lac on right arm clear and dry  Follow up 1 month

## 2025-07-22 ASSESSMENT — ENCOUNTER SYMPTOMS
NAUSEA: 0
DIARRHEA: 0
VOMITING: 0
SHORTNESS OF BREATH: 0
COUGH: 0

## 2025-07-26 DIAGNOSIS — K21.9 GASTROESOPHAGEAL REFLUX DISEASE WITHOUT ESOPHAGITIS: ICD-10-CM

## 2025-07-28 RX ORDER — PANTOPRAZOLE SODIUM 40 MG/1
40 TABLET, DELAYED RELEASE ORAL
Qty: 30 TABLET | Refills: 5 | Status: SHIPPED | OUTPATIENT
Start: 2025-07-28

## 2025-07-28 NOTE — TELEPHONE ENCOUNTER
LOV 7/21/2025    Future Appointments   Date Time Provider Department Center   8/27/2025 10:40 AM Katherine Domingo APRN - CNP [facility]

## 2025-08-05 ENCOUNTER — PATIENT MESSAGE (OUTPATIENT)
Dept: FAMILY MEDICINE CLINIC | Age: 25
End: 2025-08-05

## 2025-08-05 DIAGNOSIS — E11.9 TYPE 2 DIABETES MELLITUS WITHOUT COMPLICATION, WITHOUT LONG-TERM CURRENT USE OF INSULIN (HCC): ICD-10-CM

## 2025-08-06 RX ORDER — GLUCOSAMINE HCL/CHONDROITIN SU 500-400 MG
CAPSULE ORAL
Qty: 50 STRIP | Refills: 0 | Status: SHIPPED | OUTPATIENT
Start: 2025-08-06 | End: 2025-08-06 | Stop reason: SDUPTHER

## 2025-08-06 RX ORDER — GLUCOSAMINE HCL/CHONDROITIN SU 500-400 MG
CAPSULE ORAL
Qty: 100 STRIP | Refills: 5 | Status: SHIPPED | OUTPATIENT
Start: 2025-08-06

## 2025-08-19 RX ORDER — GLIMEPIRIDE 1 MG/1
1 TABLET ORAL
Qty: 90 TABLET | Refills: 1 | Status: SHIPPED | OUTPATIENT
Start: 2025-08-19

## 2025-08-27 ENCOUNTER — OFFICE VISIT (OUTPATIENT)
Dept: FAMILY MEDICINE CLINIC | Age: 25
End: 2025-08-27
Payer: MEDICARE

## 2025-08-27 VITALS
TEMPERATURE: 97.1 F | DIASTOLIC BLOOD PRESSURE: 85 MMHG | WEIGHT: 293 LBS | HEART RATE: 102 BPM | BODY MASS INDEX: 46.2 KG/M2 | RESPIRATION RATE: 16 BRPM | SYSTOLIC BLOOD PRESSURE: 135 MMHG | OXYGEN SATURATION: 98 %

## 2025-08-27 DIAGNOSIS — R53.83 FATIGUE, UNSPECIFIED TYPE: ICD-10-CM

## 2025-08-27 DIAGNOSIS — E11.9 TYPE 2 DIABETES MELLITUS WITHOUT COMPLICATION, WITHOUT LONG-TERM CURRENT USE OF INSULIN (HCC): Primary | ICD-10-CM

## 2025-08-27 DIAGNOSIS — R11.2 NAUSEA AND VOMITING, UNSPECIFIED VOMITING TYPE: ICD-10-CM

## 2025-08-27 LAB — HBA1C MFR BLD: 7.5 %

## 2025-08-27 PROCEDURE — 83036 HEMOGLOBIN GLYCOSYLATED A1C: CPT | Performed by: NURSE PRACTITIONER

## 2025-08-27 PROCEDURE — 1036F TOBACCO NON-USER: CPT | Performed by: NURSE PRACTITIONER

## 2025-08-27 PROCEDURE — 2022F DILAT RTA XM EVC RTNOPTHY: CPT | Performed by: NURSE PRACTITIONER

## 2025-08-27 PROCEDURE — 99214 OFFICE O/P EST MOD 30 MIN: CPT | Performed by: NURSE PRACTITIONER

## 2025-08-27 PROCEDURE — 3051F HG A1C>EQUAL 7.0%<8.0%: CPT | Performed by: NURSE PRACTITIONER

## 2025-08-27 PROCEDURE — G8417 CALC BMI ABV UP PARAM F/U: HCPCS | Performed by: NURSE PRACTITIONER

## 2025-08-27 PROCEDURE — G8427 DOCREV CUR MEDS BY ELIG CLIN: HCPCS | Performed by: NURSE PRACTITIONER

## 2025-08-27 RX ORDER — METFORMIN HYDROCHLORIDE 500 MG/1
500 TABLET, EXTENDED RELEASE ORAL
Qty: 90 TABLET | Refills: 1 | Status: SHIPPED | OUTPATIENT
Start: 2025-08-27

## 2025-08-27 SDOH — ECONOMIC STABILITY: FOOD INSECURITY: WITHIN THE PAST 12 MONTHS, YOU WORRIED THAT YOUR FOOD WOULD RUN OUT BEFORE YOU GOT MONEY TO BUY MORE.: NEVER TRUE

## 2025-08-27 SDOH — ECONOMIC STABILITY: FOOD INSECURITY: WITHIN THE PAST 12 MONTHS, THE FOOD YOU BOUGHT JUST DIDN'T LAST AND YOU DIDN'T HAVE MONEY TO GET MORE.: NEVER TRUE

## 2025-08-27 ASSESSMENT — PATIENT HEALTH QUESTIONNAIRE - PHQ9
SUM OF ALL RESPONSES TO PHQ QUESTIONS 1-9: 0
6. FEELING BAD ABOUT YOURSELF - OR THAT YOU ARE A FAILURE OR HAVE LET YOURSELF OR YOUR FAMILY DOWN: NOT AT ALL
SUM OF ALL RESPONSES TO PHQ QUESTIONS 1-9: 0
1. LITTLE INTEREST OR PLEASURE IN DOING THINGS: NOT AT ALL
7. TROUBLE CONCENTRATING ON THINGS, SUCH AS READING THE NEWSPAPER OR WATCHING TELEVISION: NOT AT ALL
DEPRESSION UNABLE TO ASSESS: FUNCTIONAL CAPACITY MOTIVATION LIMITS ACCURACY
4. FEELING TIRED OR HAVING LITTLE ENERGY: NOT AT ALL
2. FEELING DOWN, DEPRESSED OR HOPELESS: NOT AT ALL
3. TROUBLE FALLING OR STAYING ASLEEP: NOT AT ALL
5. POOR APPETITE OR OVEREATING: NOT AT ALL
8. MOVING OR SPEAKING SO SLOWLY THAT OTHER PEOPLE COULD HAVE NOTICED. OR THE OPPOSITE, BEING SO FIGETY OR RESTLESS THAT YOU HAVE BEEN MOVING AROUND A LOT MORE THAN USUAL: NOT AT ALL
9. THOUGHTS THAT YOU WOULD BE BETTER OFF DEAD, OR OF HURTING YOURSELF: NOT AT ALL
SUM OF ALL RESPONSES TO PHQ QUESTIONS 1-9: 0
10. IF YOU CHECKED OFF ANY PROBLEMS, HOW DIFFICULT HAVE THESE PROBLEMS MADE IT FOR YOU TO DO YOUR WORK, TAKE CARE OF THINGS AT HOME, OR GET ALONG WITH OTHER PEOPLE: NOT DIFFICULT AT ALL
SUM OF ALL RESPONSES TO PHQ QUESTIONS 1-9: 0

## 2025-08-27 ASSESSMENT — ENCOUNTER SYMPTOMS
ABDOMINAL PAIN: 0
VOMITING: 1
DIARRHEA: 1
NAUSEA: 1

## 2025-08-28 DIAGNOSIS — R53.83 FATIGUE, UNSPECIFIED TYPE: ICD-10-CM

## 2025-08-28 DIAGNOSIS — E11.9 TYPE 2 DIABETES MELLITUS WITHOUT COMPLICATION, WITHOUT LONG-TERM CURRENT USE OF INSULIN (HCC): ICD-10-CM

## 2025-08-28 LAB
ALBUMIN SERPL-MCNC: 3.9 G/DL (ref 3.4–5)
ALBUMIN/GLOB SERPL: 1.5 {RATIO} (ref 1.1–2.2)
ALP SERPL-CCNC: 74 U/L (ref 40–129)
ALT SERPL-CCNC: 42 U/L (ref 10–40)
ANION GAP SERPL CALCULATED.3IONS-SCNC: 14 MMOL/L (ref 3–16)
AST SERPL-CCNC: 34 U/L (ref 15–37)
BASOPHILS # BLD: 0.1 K/UL (ref 0–0.2)
BASOPHILS NFR BLD: 1.1 %
BILIRUB SERPL-MCNC: 0.3 MG/DL (ref 0–1)
BUN SERPL-MCNC: 11 MG/DL (ref 7–20)
CALCIUM SERPL-MCNC: 9.6 MG/DL (ref 8.3–10.6)
CHLORIDE SERPL-SCNC: 98 MMOL/L (ref 99–110)
CO2 SERPL-SCNC: 23 MMOL/L (ref 21–32)
CREAT SERPL-MCNC: 0.6 MG/DL (ref 0.6–1.1)
DEPRECATED RDW RBC AUTO: 13.2 % (ref 12.4–15.4)
EOSINOPHIL # BLD: 0.2 K/UL (ref 0–0.6)
EOSINOPHIL NFR BLD: 3.4 %
FERRITIN SERPL IA-MCNC: 43.6 NG/ML (ref 15–150)
GFR SERPLBLD CREATININE-BSD FMLA CKD-EPI: >90 ML/MIN/{1.73_M2}
GLUCOSE SERPL-MCNC: 157 MG/DL (ref 70–99)
HCT VFR BLD AUTO: 35.2 % (ref 36–48)
HGB BLD-MCNC: 12.1 G/DL (ref 12–16)
IRON SATN MFR SERPL: 16 % (ref 15–50)
IRON SERPL-MCNC: 71 UG/DL (ref 37–145)
LYMPHOCYTES # BLD: 1.8 K/UL (ref 1–5.1)
LYMPHOCYTES NFR BLD: 28.9 %
MCH RBC QN AUTO: 27.5 PG (ref 26–34)
MCHC RBC AUTO-ENTMCNC: 34.5 G/DL (ref 31–36)
MCV RBC AUTO: 79.9 FL (ref 80–100)
MONOCYTES # BLD: 0.4 K/UL (ref 0–1.3)
MONOCYTES NFR BLD: 6.2 %
NEUTROPHILS # BLD: 3.7 K/UL (ref 1.7–7.7)
NEUTROPHILS NFR BLD: 60.4 %
PLATELET # BLD AUTO: 343 K/UL (ref 135–450)
PMV BLD AUTO: 7.7 FL (ref 5–10.5)
POTASSIUM SERPL-SCNC: 4.6 MMOL/L (ref 3.5–5.1)
PROT SERPL-MCNC: 6.5 G/DL (ref 6.4–8.2)
RBC # BLD AUTO: 4.41 M/UL (ref 4–5.2)
SODIUM SERPL-SCNC: 135 MMOL/L (ref 136–145)
TIBC SERPL-MCNC: 435 UG/DL (ref 260–445)
TSH SERPL DL<=0.005 MIU/L-ACNC: 1.98 UIU/ML (ref 0.27–4.2)
WBC # BLD AUTO: 6.1 K/UL (ref 4–11)

## 2025-08-29 ENCOUNTER — OFFICE VISIT (OUTPATIENT)
Dept: URGENT CARE | Age: 25
End: 2025-08-29

## 2025-08-29 VITALS
TEMPERATURE: 98.1 F | DIASTOLIC BLOOD PRESSURE: 88 MMHG | HEART RATE: 102 BPM | BODY MASS INDEX: 46.2 KG/M2 | SYSTOLIC BLOOD PRESSURE: 128 MMHG | OXYGEN SATURATION: 98 % | WEIGHT: 293 LBS

## 2025-08-29 DIAGNOSIS — M79.672 LEFT FOOT PAIN: ICD-10-CM

## 2025-08-29 DIAGNOSIS — S96.912A STRAIN OF LEFT FOOT, INITIAL ENCOUNTER: Primary | ICD-10-CM

## 2025-08-29 RX ORDER — PREDNISONE 10 MG/1
10 TABLET ORAL 2 TIMES DAILY
Qty: 10 TABLET | Refills: 0 | Status: SHIPPED | OUTPATIENT
Start: 2025-08-29 | End: 2025-09-03

## 2025-08-29 ASSESSMENT — ENCOUNTER SYMPTOMS
DIARRHEA: 0
ABDOMINAL PAIN: 0
VOMITING: 0
SHORTNESS OF BREATH: 0
EYE PAIN: 0
NAUSEA: 0

## (undated) DEVICE — SUTURE VCRL + SZ 0 L27IN ABSRB VLT L26MM UR-6 5/8 CIR VCP603H

## (undated) DEVICE — ADHESIVE LIQ MED 2/3 CC VI DEV REL INJ LO RISK MASTISOL

## (undated) DEVICE — TISSUE RETRIEVAL SYSTEM: Brand: INZII RETRIEVAL SYSTEM

## (undated) DEVICE — Z DISCONTINUED PER MEDLINE TRAY SKIN PREP DRY W/ PREM GLV APPLICATORS GZ TWL OVERWRAP

## (undated) DEVICE — [HIGH FLOW INSUFFLATOR,  DO NOT USE IF PACKAGE IS DAMAGED,  KEEP DRY,  KEEP AWAY FROM SUNLIGHT,  PROTECT FROM HEAT AND RADIOACTIVE SOURCES.]: Brand: PNEUMOSURE

## (undated) DEVICE — GAUZE,SPONGE,2"X2",8PLY,STERILE,LF,2'S: Brand: MEDLINE

## (undated) DEVICE — SEALER LAP L37CM MARYLAND JAW OPN NANO COAT MULTIFUNCTIONAL

## (undated) DEVICE — 3M™ TEGADERM™ TRANSPARENT FILM DRESSING FRAME STYLE WITH BORDER, 1616, 4 IN X 4-3/4 IN (10 CM X 12 CM), 50/CT 4CT/CASE: Brand: 3M™ TEGADERM™

## (undated) DEVICE — PACK,LAPAROSCOPY,PK II,SIRUS: Brand: MEDLINE

## (undated) DEVICE — 3M™ TEGADERM™ TRANSPARENT FILM DRESSING FRAME STYLE, 1624W, 2-3/8 IN X 2-3/4 IN (6 CM X 7 CM), 100/CT 4CT/CASE: Brand: 3M™ TEGADERM™

## (undated) DEVICE — SUTURE VCRL + SZ 4-0 L18IN ABSRB UD L19MM PS-2 3/8 CIR PRIM VCP496H

## (undated) DEVICE — SYRINGE MED 10ML POLYPR GEN PURP FLAT TOP LUERLOCK TIP

## (undated) DEVICE — STRIP SKIN CLOSURE 5X1 IN REINF N WOVEN WHT STERI STRP

## (undated) DEVICE — Device

## (undated) DEVICE — LIQUIBAND RAPID ADHESIVE 36/CS 0.8ML: Brand: MEDLINE

## (undated) DEVICE — TROCAR: Brand: KII FIOS FIRST ENTRY

## (undated) DEVICE — CONTAINER,SPECIMEN,OR STERILE,4OZ: Brand: MEDLINE

## (undated) DEVICE — GLOVE SURG SZ 65 THK91MIL LTX FREE SYN POLYISOPRENE

## (undated) DEVICE — 1LYRTR 16FR10ML 100%SILI SNAP: Brand: MEDLINE INDUSTRIES, INC.

## (undated) DEVICE — GOWN,AURORA,NONREINFORCED,LARGE: Brand: MEDLINE

## (undated) DEVICE — PACK PROCEDURE SURG GYN LAP CDS

## (undated) DEVICE — SUTURE VCRL SZ 0 L36IN ABSRB UD CT-1 L36MM 1/2 CIR TAPR PNT VCP946H

## (undated) DEVICE — PUMP SUC IRR TBNG L10FT W/ HNDPC ASSEMB STRYKEFLOW 2

## (undated) DEVICE — Device: Brand: PRIME MEDICAL LLC

## (undated) DEVICE — STANDARD HYPODERMIC NEEDLE,POLYPROPYLENE HUB: Brand: MONOJECT

## (undated) DEVICE — TROCAR: Brand: KII SLEEVE

## (undated) DEVICE — SUTURE VCRL + SZ 0 L18IN ABSRB CLR VCP112G